# Patient Record
Sex: FEMALE | Race: WHITE | NOT HISPANIC OR LATINO | Employment: UNEMPLOYED | URBAN - NONMETROPOLITAN AREA
[De-identification: names, ages, dates, MRNs, and addresses within clinical notes are randomized per-mention and may not be internally consistent; named-entity substitution may affect disease eponyms.]

---
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
## 2018-01-31 ENCOUNTER — DOCUMENTATION (OUTPATIENT)
Dept: FAMILY MEDICINE CLINIC | Facility: CLINIC | Age: 18
End: 2018-01-31

## 2018-01-31 NOTE — PROGRESS NOTES
Referral was entered from Tena KAMARA to Alejandra KAMARA for shortness of breath. The number given from the patient was disconnected. I found another number for the patient and found it to be disconnected as well so I sent a letter to the patients last known address. The letter directs the patient on who to call if she needs an appointment with ANH Wallis.

## 2018-10-29 ENCOUNTER — OFFICE VISIT (OUTPATIENT)
Dept: FAMILY MEDICINE CLINIC | Facility: CLINIC | Age: 18
End: 2018-10-29

## 2018-10-29 ENCOUNTER — LAB (OUTPATIENT)
Dept: LAB | Facility: OTHER | Age: 18
End: 2018-10-29

## 2018-10-29 VITALS
DIASTOLIC BLOOD PRESSURE: 78 MMHG | BODY MASS INDEX: 15.59 KG/M2 | WEIGHT: 97 LBS | HEIGHT: 66 IN | HEART RATE: 111 BPM | SYSTOLIC BLOOD PRESSURE: 112 MMHG | OXYGEN SATURATION: 98 % | TEMPERATURE: 98.3 F

## 2018-10-29 DIAGNOSIS — R63.4 WEIGHT LOSS: ICD-10-CM

## 2018-10-29 DIAGNOSIS — R63.6 UNDERWEIGHT: ICD-10-CM

## 2018-10-29 DIAGNOSIS — R10.9 ABDOMINAL PAIN, UNSPECIFIED ABDOMINAL LOCATION: Primary | ICD-10-CM

## 2018-10-29 DIAGNOSIS — R10.9 ABDOMINAL PAIN, UNSPECIFIED ABDOMINAL LOCATION: ICD-10-CM

## 2018-10-29 LAB
ALBUMIN SERPL-MCNC: 4.6 G/DL (ref 3.5–5)
ALBUMIN/GLOB SERPL: 1.7 G/DL (ref 1.1–1.8)
ALP SERPL-CCNC: 41 U/L (ref 38–126)
ALT SERPL W P-5'-P-CCNC: 13 U/L
ANION GAP SERPL CALCULATED.3IONS-SCNC: 13 MMOL/L (ref 5–15)
AST SERPL-CCNC: 25 U/L (ref 14–36)
BACTERIA UR QL AUTO: ABNORMAL /HPF
BASOPHILS # BLD AUTO: 0.01 10*3/MM3 (ref 0–0.2)
BASOPHILS NFR BLD AUTO: 0.3 % (ref 0–2)
BILIRUB SERPL-MCNC: 1.2 MG/DL (ref 0.2–1.3)
BILIRUB UR QL STRIP: ABNORMAL
BUN BLD-MCNC: 13 MG/DL (ref 7–17)
BUN/CREAT SERPL: 13.3 (ref 7–25)
CALCIUM SPEC-SCNC: 10 MG/DL (ref 8.4–10.2)
CHLORIDE SERPL-SCNC: 110 MMOL/L (ref 98–107)
CLARITY UR: ABNORMAL
CO2 SERPL-SCNC: 19 MMOL/L (ref 22–30)
COLOR UR: ABNORMAL
CREAT BLD-MCNC: 0.98 MG/DL (ref 0.52–1.04)
DEPRECATED RDW RBC AUTO: 40.8 FL (ref 36.4–46.3)
EOSINOPHIL # BLD AUTO: 0.04 10*3/MM3 (ref 0–0.7)
EOSINOPHIL NFR BLD AUTO: 1 % (ref 0–7)
ERYTHROCYTE [DISTWIDTH] IN BLOOD BY AUTOMATED COUNT: 13.2 % (ref 11.5–14.5)
GFR SERPL CREATININE-BSD FRML MDRD: 72 ML/MIN/1.73 (ref 71–165)
GLOBULIN UR ELPH-MCNC: 2.7 GM/DL (ref 2.3–3.5)
GLUCOSE BLD-MCNC: 84 MG/DL (ref 74–99)
GLUCOSE UR STRIP-MCNC: NEGATIVE MG/DL
HCT VFR BLD AUTO: 43.8 % (ref 35–45)
HGB BLD-MCNC: 14.6 G/DL (ref 12–15.5)
HGB UR QL STRIP.AUTO: ABNORMAL
HYALINE CASTS UR QL AUTO: ABNORMAL /LPF
KETONES UR QL STRIP: ABNORMAL
LEUKOCYTE ESTERASE UR QL STRIP.AUTO: NEGATIVE
LYMPHOCYTES # BLD AUTO: 1.68 10*3/MM3 (ref 0.6–4.2)
LYMPHOCYTES NFR BLD AUTO: 43.1 % (ref 10–50)
MCH RBC QN AUTO: 28.6 PG (ref 26.5–34)
MCHC RBC AUTO-ENTMCNC: 33.3 G/DL (ref 31.4–36)
MCV RBC AUTO: 85.9 FL (ref 80–98)
MONOCYTES # BLD AUTO: 0.33 10*3/MM3 (ref 0–0.9)
MONOCYTES NFR BLD AUTO: 8.5 % (ref 0–12)
NEUTROPHILS # BLD AUTO: 1.84 10*3/MM3 (ref 2–8.6)
NEUTROPHILS NFR BLD AUTO: 47.1 % (ref 37–80)
NITRITE UR QL STRIP: NEGATIVE
PH UR STRIP.AUTO: <=5 [PH] (ref 5.5–8)
PLATELET # BLD AUTO: 243 10*3/MM3 (ref 150–450)
PMV BLD AUTO: 10.4 FL (ref 8–12)
POTASSIUM BLD-SCNC: 4.5 MMOL/L (ref 3.4–5)
PROT SERPL-MCNC: 7.3 G/DL (ref 6.3–8.2)
PROT UR QL STRIP: ABNORMAL
RBC # BLD AUTO: 5.1 10*6/MM3 (ref 3.77–5.16)
RBC # UR: ABNORMAL /HPF
REF LAB TEST METHOD: ABNORMAL
SODIUM BLD-SCNC: 142 MMOL/L (ref 137–145)
SP GR UR STRIP: >=1.03 (ref 1–1.03)
SQUAMOUS #/AREA URNS HPF: ABNORMAL /HPF
T4 FREE SERPL-MCNC: 1.32 NG/DL (ref 0.78–2.19)
TSH SERPL DL<=0.05 MIU/L-ACNC: 3.78 MIU/ML (ref 0.46–4.68)
UROBILINOGEN UR QL STRIP: ABNORMAL
WBC NRBC COR # BLD: 3.9 10*3/MM3 (ref 3.2–9.8)
WBC UR QL AUTO: ABNORMAL /HPF

## 2018-10-29 PROCEDURE — 84439 ASSAY OF FREE THYROXINE: CPT | Performed by: NURSE PRACTITIONER

## 2018-10-29 PROCEDURE — 99213 OFFICE O/P EST LOW 20 MIN: CPT | Performed by: NURSE PRACTITIONER

## 2018-10-29 PROCEDURE — 87086 URINE CULTURE/COLONY COUNT: CPT | Performed by: NURSE PRACTITIONER

## 2018-10-29 PROCEDURE — 81001 URINALYSIS AUTO W/SCOPE: CPT | Performed by: NURSE PRACTITIONER

## 2018-10-29 PROCEDURE — 80050 GENERAL HEALTH PANEL: CPT | Performed by: NURSE PRACTITIONER

## 2018-10-29 RX ORDER — CIPROFLOXACIN 500 MG/1
500 TABLET, FILM COATED ORAL 2 TIMES DAILY
Refills: 0 | COMMUNITY
Start: 2018-10-27 | End: 2019-01-30

## 2018-10-29 RX ORDER — DICYCLOMINE HCL 20 MG
20 TABLET ORAL 3 TIMES DAILY PRN
Refills: 0 | COMMUNITY
Start: 2018-10-27 | End: 2019-01-30

## 2018-10-29 RX ORDER — OMEPRAZOLE 20 MG/1
20 CAPSULE, DELAYED RELEASE ORAL DAILY
Refills: 0 | COMMUNITY
Start: 2018-10-27 | End: 2019-01-30

## 2018-10-29 RX ORDER — NORGESTREL AND ETHINYL ESTRADIOL 0.3-0.03MG
KIT ORAL
Refills: 0 | COMMUNITY
Start: 2018-09-06 | End: 2019-01-30

## 2018-10-30 ENCOUNTER — TELEPHONE (OUTPATIENT)
Dept: FAMILY MEDICINE CLINIC | Facility: CLINIC | Age: 18
End: 2018-10-30

## 2018-10-30 NOTE — TELEPHONE ENCOUNTER
Advised the patient of her UA. She is on her period.       ----- Message from ANH Varela sent at 10/29/2018  3:23 PM CDT -----  Pls inform she has blood in her urine, is she on her period?

## 2018-10-30 NOTE — TELEPHONE ENCOUNTER
Advised patient of XR of abdomen and she may take an OTC stool softener. The patient voiced understanding.       ----- Message from ANH Varela sent at 10/29/2018  3:21 PM CDT -----  Pls inform she has moderate amt of stool in RIGHT colon, may take OTC stool softener.

## 2018-10-31 LAB — BACTERIA SPEC AEROBE CULT: NORMAL

## 2018-11-12 NOTE — PROGRESS NOTES
Subjective   Mandi Wlaters is a 18 y.o. female who presents to the office complaining of nausea and anorexia the past two months.  Feels as if she will choke or vomit so she spits food out.  Was seen at [facility] two days ago for UTI and GERD.  [facility] bilious vomiting with nausea and was given Zofran.  Has a bowel movement only once per week.  PCP is [facility].  History of Present Illness     The following portions of the patient's history were reviewed and updated as appropriate: allergies, current medications, past family history, past medical history, past social history, past surgical history and problem list.    Review of Systems   Constitutional: Negative for chills, fatigue and fever.   HENT: Negative for congestion, sneezing, sore throat and trouble swallowing.    Eyes: Negative for visual disturbance.   Respiratory: Positive for choking. Negative for cough, chest tightness, shortness of breath and wheezing.    Cardiovascular: Negative for chest pain, palpitations and leg swelling.   Gastrointestinal: Positive for nausea. Negative for abdominal pain, constipation, diarrhea and vomiting.   Genitourinary: Negative for dysuria, frequency and urgency.   Musculoskeletal: Positive for arthralgias and myalgias. Negative for neck pain.   Skin: Negative for rash.   Neurological: Negative for dizziness, weakness and headaches.   Psychiatric/Behavioral:        In the past two weeks the pt has not felt down, depressed, hopeless or lost interest in doing things   All other systems reviewed and are negative.      Objective   Physical Exam   Constitutional: She is oriented to person, place, and time. She appears well-developed and well-nourished. She is cooperative.  Non-toxic appearance. She has a sickly appearance. She does not appear ill.   HENT:   Head: Normocephalic and atraumatic.   Right Ear: External ear normal.   Left Ear: External ear normal.   Nose: Nose normal.   Mouth/Throat:  Uvula is midline, oropharynx is clear and moist and mucous membranes are normal. Dental caries present.   Eyes: Conjunctivae, EOM and lids are normal. Pupils are equal, round, and reactive to light. Right eye exhibits no discharge. Left eye exhibits no discharge. No scleral icterus.   Neck: Trachea normal, normal range of motion and phonation normal. Neck supple. No thyromegaly present.   Cardiovascular: Normal rate, regular rhythm, normal heart sounds and intact distal pulses. Exam reveals no gallop and no friction rub.   No murmur heard.  Pulmonary/Chest: Effort normal and breath sounds normal. No respiratory distress. She has no wheezes. She has no rales.   Abdominal: Soft. Normal appearance and bowel sounds are normal. She exhibits no distension. There is generalized tenderness. There is no rebound and no guarding.   Musculoskeletal: Normal range of motion. She exhibits no edema.   Lymphadenopathy:     She has no cervical adenopathy.   Neurological: She is alert and oriented to person, place, and time. No cranial nerve deficit. GCS eye subscore is 4. GCS verbal subscore is 5. GCS motor subscore is 6.   Skin: Skin is warm, dry and intact. Capillary refill takes less than 2 seconds. No rash noted. There is pallor.   Psychiatric: She has a normal mood and affect. Her behavior is normal. Judgment and thought content normal.   Nursing note and vitals reviewed.      Assessment/Plan   Mandi was seen today for anorexia and nausea.    Diagnoses and all orders for this visit:    Abdominal pain, unspecified abdominal location  -     CBC & Differential; Future  -     Comprehensive Metabolic Panel; Future  -     T4, Free; Future  -     TSH; Future  -     Urinalysis With Culture If Indicated - Urine, Clean Catch; Future  -     XR Abdomen Flat & Upright    Weight loss  -     CBC & Differential; Future  -     Comprehensive Metabolic Panel; Future  -     T4, Free; Future  -     TSH; Future  -     Urinalysis With Culture If  Indicated - Urine, Clean Catch; Future  -     XR Abdomen Flat & Upright    Underweight  -     CBC & Differential; Future  -     Comprehensive Metabolic Panel; Future  -     T4, Free; Future  -     TSH; Future  -     Urinalysis With Culture If Indicated - Urine, Clean Catch; Future  -     XR Abdomen Flat & Upright    Encouraged clear liquids and adv as tolerated.  Good handwashing.  OTC Miralax prn.

## 2019-05-07 ENCOUNTER — LAB (OUTPATIENT)
Dept: LAB | Facility: OTHER | Age: 19
End: 2019-05-07

## 2019-05-07 ENCOUNTER — OFFICE VISIT (OUTPATIENT)
Dept: FAMILY MEDICINE CLINIC | Facility: CLINIC | Age: 19
End: 2019-05-07

## 2019-05-07 VITALS
HEIGHT: 65 IN | SYSTOLIC BLOOD PRESSURE: 100 MMHG | OXYGEN SATURATION: 97 % | TEMPERATURE: 98.7 F | WEIGHT: 101 LBS | DIASTOLIC BLOOD PRESSURE: 64 MMHG | BODY MASS INDEX: 16.83 KG/M2 | HEART RATE: 102 BPM

## 2019-05-07 DIAGNOSIS — R10.84 GENERALIZED ABDOMINAL PAIN: ICD-10-CM

## 2019-05-07 DIAGNOSIS — R11.0 NAUSEA: ICD-10-CM

## 2019-05-07 DIAGNOSIS — R63.6 UNDERWEIGHT: ICD-10-CM

## 2019-05-07 DIAGNOSIS — R63.6 UNDERWEIGHT: Primary | ICD-10-CM

## 2019-05-07 LAB
ALBUMIN SERPL-MCNC: 4.5 G/DL (ref 3.5–5)
ALBUMIN/GLOB SERPL: 1.6 G/DL (ref 1.1–1.8)
ALP SERPL-CCNC: 57 U/L (ref 38–126)
ALT SERPL W P-5'-P-CCNC: 14 U/L
ANION GAP SERPL CALCULATED.3IONS-SCNC: 9 MMOL/L (ref 5–15)
AST SERPL-CCNC: 20 U/L (ref 14–36)
B-HCG UR QL: NEGATIVE
BASOPHILS # BLD AUTO: 0.02 10*3/MM3 (ref 0–0.2)
BASOPHILS NFR BLD AUTO: 0.3 % (ref 0–1.5)
BILIRUB SERPL-MCNC: 0.7 MG/DL (ref 0.2–1.3)
BILIRUB UR QL STRIP: NEGATIVE
BUN BLD-MCNC: 9 MG/DL (ref 7–23)
BUN/CREAT SERPL: 8.3 (ref 7–25)
CALCIUM SPEC-SCNC: 10 MG/DL (ref 8.4–10.2)
CHLORIDE SERPL-SCNC: 104 MMOL/L (ref 101–112)
CLARITY UR: CLEAR
CO2 SERPL-SCNC: 27 MMOL/L (ref 22–30)
COLOR UR: YELLOW
CREAT BLD-MCNC: 1.08 MG/DL (ref 0.52–1.04)
DEPRECATED RDW RBC AUTO: 45.8 FL (ref 37–54)
EOSINOPHIL # BLD AUTO: 0.14 10*3/MM3 (ref 0–0.4)
EOSINOPHIL NFR BLD AUTO: 2.1 % (ref 0.3–6.2)
ERYTHROCYTE [DISTWIDTH] IN BLOOD BY AUTOMATED COUNT: 14.4 % (ref 12.3–15.4)
GFR SERPL CREATININE-BSD FRML MDRD: 64 ML/MIN/1.73 (ref 71–165)
GLOBULIN UR ELPH-MCNC: 2.8 GM/DL (ref 2.3–3.5)
GLUCOSE BLD-MCNC: 98 MG/DL (ref 70–99)
GLUCOSE UR STRIP-MCNC: NEGATIVE MG/DL
HCT VFR BLD AUTO: 44.6 % (ref 34–46.6)
HGB BLD-MCNC: 14.5 G/DL (ref 12–15.9)
HGB UR QL STRIP.AUTO: NEGATIVE
KETONES UR QL STRIP: ABNORMAL
LEUKOCYTE ESTERASE UR QL STRIP.AUTO: NEGATIVE
LYMPHOCYTES # BLD AUTO: 3.29 10*3/MM3 (ref 0.7–3.1)
LYMPHOCYTES NFR BLD AUTO: 49 % (ref 19.6–45.3)
MCH RBC QN AUTO: 29.1 PG (ref 26.6–33)
MCHC RBC AUTO-ENTMCNC: 32.5 G/DL (ref 31.5–35.7)
MCV RBC AUTO: 89.4 FL (ref 79–97)
MONOCYTES # BLD AUTO: 0.47 10*3/MM3 (ref 0.1–0.9)
MONOCYTES NFR BLD AUTO: 7 % (ref 5–12)
NEUTROPHILS # BLD AUTO: 2.79 10*3/MM3 (ref 1.7–7)
NEUTROPHILS NFR BLD AUTO: 41.6 % (ref 42.7–76)
NITRITE UR QL STRIP: NEGATIVE
PH UR STRIP.AUTO: 5.5 [PH] (ref 5.5–8)
PLATELET # BLD AUTO: 261 10*3/MM3 (ref 140–450)
PMV BLD AUTO: 10.4 FL (ref 6–12)
POTASSIUM BLD-SCNC: 4 MMOL/L (ref 3.4–5)
PROT SERPL-MCNC: 7.3 G/DL (ref 6.3–8.6)
PROT UR QL STRIP: NEGATIVE
RBC # BLD AUTO: 4.99 10*6/MM3 (ref 3.77–5.28)
SODIUM BLD-SCNC: 140 MMOL/L (ref 137–145)
SP GR UR STRIP: >=1.03 (ref 1–1.03)
UROBILINOGEN UR QL STRIP: ABNORMAL
WBC NRBC COR # BLD: 6.71 10*3/MM3 (ref 3.4–10.8)

## 2019-05-07 PROCEDURE — 80307 DRUG TEST PRSMV CHEM ANLYZR: CPT | Performed by: NURSE PRACTITIONER

## 2019-05-07 PROCEDURE — 84439 ASSAY OF FREE THYROXINE: CPT | Performed by: NURSE PRACTITIONER

## 2019-05-07 PROCEDURE — 81025 URINE PREGNANCY TEST: CPT | Performed by: NURSE PRACTITIONER

## 2019-05-07 PROCEDURE — 99213 OFFICE O/P EST LOW 20 MIN: CPT | Performed by: NURSE PRACTITIONER

## 2019-05-07 PROCEDURE — 80050 GENERAL HEALTH PANEL: CPT | Performed by: NURSE PRACTITIONER

## 2019-05-07 PROCEDURE — 82607 VITAMIN B-12: CPT | Performed by: NURSE PRACTITIONER

## 2019-05-07 PROCEDURE — 82306 VITAMIN D 25 HYDROXY: CPT | Performed by: NURSE PRACTITIONER

## 2019-05-07 PROCEDURE — 81003 URINALYSIS AUTO W/O SCOPE: CPT | Performed by: NURSE PRACTITIONER

## 2019-05-07 RX ORDER — RANITIDINE HCL 75 MG
75 TABLET ORAL 2 TIMES DAILY
Qty: 60 TABLET | Refills: 0 | Status: SHIPPED | OUTPATIENT
Start: 2019-05-07 | End: 2019-06-10 | Stop reason: SDUPTHER

## 2019-05-07 RX ORDER — MEDROXYPROGESTERONE ACETATE 150 MG/ML
150 INJECTION, SUSPENSION INTRAMUSCULAR
COMMUNITY
End: 2019-07-09 | Stop reason: SDUPTHER

## 2019-05-07 RX ORDER — ONDANSETRON 4 MG/1
4 TABLET, FILM COATED ORAL EVERY 8 HOURS PRN
Qty: 21 TABLET | Refills: 0 | Status: SHIPPED | OUTPATIENT
Start: 2019-05-07 | End: 2019-06-10

## 2019-05-08 LAB
25(OH)D3 SERPL-MCNC: 47.7 NG/ML (ref 30–100)
AMPHET+METHAMPHET UR QL: NEGATIVE
BARBITURATES UR QL SCN: NEGATIVE
BENZODIAZ UR QL SCN: NEGATIVE
CANNABINOIDS SERPL QL: NEGATIVE
COCAINE UR QL: NEGATIVE
METHADONE UR QL SCN: NEGATIVE
OPIATES UR QL: NEGATIVE
OXYCODONE UR QL SCN: NEGATIVE
T4 FREE SERPL-MCNC: 1.53 NG/DL (ref 0.93–1.7)
TSH SERPL DL<=0.05 MIU/L-ACNC: 1.22 MIU/ML (ref 0.27–4.2)
VIT B12 BLD-MCNC: 211 PG/ML (ref 211–946)

## 2019-05-10 ENCOUNTER — CLINICAL SUPPORT (OUTPATIENT)
Dept: FAMILY MEDICINE CLINIC | Facility: CLINIC | Age: 19
End: 2019-05-10

## 2019-05-10 ENCOUNTER — TELEPHONE (OUTPATIENT)
Dept: FAMILY MEDICINE CLINIC | Facility: CLINIC | Age: 19
End: 2019-05-10

## 2019-05-10 DIAGNOSIS — E53.8 B12 DEFICIENCY: Primary | ICD-10-CM

## 2019-05-10 PROCEDURE — 96372 THER/PROPH/DIAG INJ SC/IM: CPT | Performed by: NURSE PRACTITIONER

## 2019-05-10 RX ORDER — MEGESTROL ACETATE 40 MG/ML
400 SUSPENSION ORAL DAILY
Qty: 240 ML | Refills: 0 | OUTPATIENT
Start: 2019-05-10 | End: 2019-05-17

## 2019-05-10 RX ORDER — CYANOCOBALAMIN 1000 UG/ML
1000 INJECTION, SOLUTION INTRAMUSCULAR; SUBCUTANEOUS
Status: DISCONTINUED | OUTPATIENT
Start: 2019-05-10 | End: 2022-02-23

## 2019-05-10 RX ADMIN — CYANOCOBALAMIN 1000 MCG: 1000 INJECTION, SOLUTION INTRAMUSCULAR; SUBCUTANEOUS at 11:01

## 2019-05-10 NOTE — TELEPHONE ENCOUNTER
I advised the patient of her lab results and monthly B12 injections. The patient voiced understanding.       ----- Message from ANH Varela sent at 5/8/2019  9:39 AM CDT -----  Pls inform her v b12 is low, get her set up on injections to come in here on nurses schedule.  She needs to come once a month.

## 2019-05-22 NOTE — PROGRESS NOTES
Subjective   Mandi Walters is a 19 y.o. female. Presents today with complaints of abd pain that occurs within five minutes of eating.  Is accompanied by nausea.  Denies diarrhea after eating.  Had a bowel movement yesterday.  Is requesting something for weight gain.    History of Present Illness     The following portions of the patient's history were reviewed and updated as appropriate: allergies, current medications, past family history, past medical history, past social history, past surgical history and problem list.    Review of Systems   Constitutional: Positive for appetite change. Negative for chills, fatigue and fever.   HENT: Negative for congestion, sneezing, sore throat and trouble swallowing.    Eyes: Negative for visual disturbance.   Respiratory: Negative for cough, chest tightness, shortness of breath and wheezing.    Cardiovascular: Negative for chest pain, palpitations and leg swelling.   Gastrointestinal: Positive for abdominal pain and nausea. Negative for constipation, diarrhea and vomiting.   Genitourinary: Negative for dysuria, frequency and urgency.   Musculoskeletal: Negative for neck pain.   Skin: Negative for rash.   Neurological: Negative for dizziness, weakness and headaches.   Psychiatric/Behavioral:        In the past two weeks the pt has not felt down, depressed, hopeless or lost interest in doing things   All other systems reviewed and are negative.      Objective   Physical Exam   Constitutional: She is oriented to person, place, and time. She appears well-developed. She appears cachectic. She is cooperative. She has a sickly appearance.   HENT:   Head: Normocephalic and atraumatic.   Right Ear: Tympanic membrane and external ear normal.   Left Ear: Tympanic membrane and external ear normal.   Nose: Nose normal.   Mouth/Throat: Uvula is midline, oropharynx is clear and moist and mucous membranes are normal.   Eyes: Conjunctivae, EOM and lids are normal. Pupils are equal,  round, and reactive to light. Right eye exhibits no discharge. Left eye exhibits no discharge. No scleral icterus.   Neck: Normal range of motion. Neck supple. No thyromegaly present.   Cardiovascular: Normal rate, regular rhythm, normal heart sounds and intact distal pulses. Exam reveals no gallop and no friction rub.   No murmur heard.  Pulmonary/Chest: Effort normal and breath sounds normal. No respiratory distress. She has no wheezes. She has no rales.   Abdominal: Soft. Normal appearance and bowel sounds are normal. She exhibits no distension. There is generalized tenderness. There is no rigidity, no rebound, no guarding, no CVA tenderness, no tenderness at McBurney's point and negative Roberto's sign.   Musculoskeletal: Normal range of motion. She exhibits no edema.   Lymphadenopathy:     She has no cervical adenopathy.   Neurological: She is alert and oriented to person, place, and time. No cranial nerve deficit. GCS eye subscore is 4. GCS verbal subscore is 5. GCS motor subscore is 6.   Skin: Skin is warm, dry and intact. Capillary refill takes less than 2 seconds. No rash noted.   Psychiatric: She has a normal mood and affect. Her behavior is normal. Judgment and thought content normal.   Nursing note and vitals reviewed.      Assessment/Plan   Mandi was seen today for annual exam.    Diagnoses and all orders for this visit:    Underweight  -     T4, Free; Future  -     TSH; Future  -     Urinalysis With Culture If Indicated - Urine, Clean Catch; Future  -     Urine Drug Screen - Urine, Clean Catch; Future  -     Vitamin B12; Future  -     Vitamin D 25 Hydroxy; Future  -     Pregnancy, Urine - Urine, Clean Catch; Future    Generalized abdominal pain  -     CBC & Differential; Future  -     Comprehensive Metabolic Panel; Future  -     Urinalysis With Culture If Indicated - Urine, Clean Catch; Future  -     Urine Drug Screen - Urine, Clean Catch; Future  -     Vitamin B12; Future  -     Vitamin D 25 Hydroxy;  Future  -     Pregnancy, Urine - Urine, Clean Catch; Future    Nausea  -     Urinalysis With Culture If Indicated - Urine, Clean Catch; Future  -     Urine Drug Screen - Urine, Clean Catch; Future  -     Vitamin B12; Future  -     Vitamin D 25 Hydroxy; Future  -     Pregnancy, Urine - Urine, Clean Catch; Future    Other orders  -     raNITIdine (ZANTAC) 75 MG tablet; Take 1 tablet by mouth 2 (Two) Times a Day.  -     ondansetron (ZOFRAN) 4 MG tablet; Take 1 tablet by mouth Every 8 (Eight) Hours As Needed for Nausea or Vomiting.    Encouraged to eat small, frequent meals a day.  Did not ask for pain medications.  Make sure drinking at least 96 ounces of water daily.  Requesting labwork prior to starting appetite stimulant.

## 2019-06-10 ENCOUNTER — OFFICE VISIT (OUTPATIENT)
Dept: FAMILY MEDICINE CLINIC | Facility: CLINIC | Age: 19
End: 2019-06-10

## 2019-06-10 VITALS
TEMPERATURE: 98.6 F | HEART RATE: 104 BPM | SYSTOLIC BLOOD PRESSURE: 110 MMHG | BODY MASS INDEX: 17.83 KG/M2 | OXYGEN SATURATION: 98 % | DIASTOLIC BLOOD PRESSURE: 70 MMHG | HEIGHT: 65 IN | WEIGHT: 107 LBS

## 2019-06-10 DIAGNOSIS — R63.4 WEIGHT LOSS: ICD-10-CM

## 2019-06-10 DIAGNOSIS — R10.84 GENERALIZED ABDOMINAL PAIN: ICD-10-CM

## 2019-06-10 DIAGNOSIS — N93.9 VAGINAL BLEEDING: ICD-10-CM

## 2019-06-10 DIAGNOSIS — R63.6 UNDERWEIGHT: Primary | ICD-10-CM

## 2019-06-10 DIAGNOSIS — R11.0 NAUSEA: ICD-10-CM

## 2019-06-10 PROCEDURE — 96372 THER/PROPH/DIAG INJ SC/IM: CPT | Performed by: NURSE PRACTITIONER

## 2019-06-10 PROCEDURE — 99214 OFFICE O/P EST MOD 30 MIN: CPT | Performed by: NURSE PRACTITIONER

## 2019-06-10 RX ORDER — PROMETHAZINE HYDROCHLORIDE 25 MG/1
25 TABLET ORAL DAILY PRN
Qty: 21 TABLET | Refills: 0 | Status: SHIPPED | OUTPATIENT
Start: 2019-06-10 | End: 2019-06-26 | Stop reason: SDUPTHER

## 2019-06-10 RX ORDER — RANITIDINE HCL 75 MG
75 TABLET ORAL 2 TIMES DAILY
Qty: 60 TABLET | Refills: 0 | Status: SHIPPED | OUTPATIENT
Start: 2019-06-10 | End: 2019-06-26 | Stop reason: SDUPTHER

## 2019-06-10 RX ADMIN — CYANOCOBALAMIN 1000 MCG: 1000 INJECTION, SOLUTION INTRAMUSCULAR; SUBCUTANEOUS at 14:15

## 2019-06-10 NOTE — PROGRESS NOTES
"Subjective   Mandi Walters is a 19 y.o. female who presents to the office for underweight follow-up.  Was taking Megestrol but started to develop adverse side effects after just a few doses.  When I asked her what side effects she said \"all of them.\"  Then I asked if she read the insert describing the side effects located inside the box prior to taking the medication but she denies and states the started with the side effects first. [facility]  for [facility] issue.  Tells me she starting having difficulty breathing.  Tells me she grazes instead of three meals a day.  Also states that the Zofran is not helping with the nausea, is requesting Phenergan.  Is also having vaginal bleeding, is on DepoProvera injections.  Proceeds to then tell me she's having low back pain, taking Tylenol which is hurting her stomach.  Advised no NSAIDs.  Unable to use a heating pad because she currently has  puppies that are using that, \"the momma  and almost all the puppies have .\"    History of Present Illness     The following portions of the patient's history were reviewed and updated as appropriate: allergies, current medications, past family history, past medical history, past social history, past surgical history and problem list.    Review of Systems   Constitutional: Positive for appetite change. Negative for chills, fatigue and fever.   HENT: Negative for congestion, sneezing, sore throat and trouble swallowing.    Eyes: Negative for visual disturbance.   Respiratory: Negative for cough, chest tightness, shortness of breath and wheezing.    Cardiovascular: Negative for chest pain, palpitations and leg swelling.   Gastrointestinal: Negative for abdominal pain, constipation, diarrhea, nausea and vomiting.   Genitourinary: Positive for vaginal bleeding. Negative for dysuria, frequency and urgency.   Musculoskeletal: Positive for back pain. Negative for neck pain.   Skin: Negative for rash. "   Neurological: Negative for dizziness, weakness and headaches.   Psychiatric/Behavioral:        In the past two weeks the pt has not felt down, depressed, hopeless or lost interest in doing things   All other systems reviewed and are negative.      Objective   Physical Exam   Constitutional: She is oriented to person, place, and time. She appears well-developed and well-nourished. She is cooperative.  Non-toxic appearance. No distress.   HENT:   Head: Normocephalic and atraumatic.   Right Ear: External ear normal.   Left Ear: External ear normal.   Nose: Nose normal.   Mouth/Throat: Oropharynx is clear and moist.   Eyes: Conjunctivae, EOM and lids are normal. Pupils are equal, round, and reactive to light. Right eye exhibits no discharge. Left eye exhibits no discharge. No scleral icterus.   Neck: Normal range of motion. Neck supple. No thyromegaly present.   Cardiovascular: Normal rate, regular rhythm, normal heart sounds and intact distal pulses. Exam reveals no gallop and no friction rub.   No murmur heard.  Pulmonary/Chest: Effort normal and breath sounds normal. No respiratory distress. She has no wheezes. She has no rales.   Abdominal: Soft. Normal appearance and bowel sounds are normal. She exhibits no distension. There is no tenderness. There is no rebound and no guarding.   Musculoskeletal: Normal range of motion. She exhibits no edema.   Lymphadenopathy:     She has no cervical adenopathy.   Neurological: She is alert and oriented to person, place, and time. No cranial nerve deficit. GCS eye subscore is 4. GCS verbal subscore is 5. GCS motor subscore is 6.   Patient is simple-minded, able to carry on conversation; dropped out of school in the 8th grade.   Skin: Skin is warm and dry. No rash noted.   Psychiatric: She has a normal mood and affect. Her behavior is normal. Judgment and thought content normal.   Nursing note and vitals reviewed.      Assessment/Plan   Mandi was seen today for  underweight.    Diagnoses and all orders for this visit:    Underweight  -     Ambulatory Referral to Gastroenterology    Generalized abdominal pain  -     Ambulatory Referral to Gastroenterology    Nausea  -     Ambulatory Referral to Gastroenterology    Weight loss  -     Ambulatory Referral to Gastroenterology    Vaginal bleeding  -     Ambulatory Referral to Gynecology    Other orders  -     raNITIdine (ZANTAC) 75 MG tablet; Take 1 tablet by mouth 2 (Two) Times a Day.  -     promethazine (PHENERGAN) 25 MG tablet; Take 1 tablet by mouth Daily As Needed for Nausea or Vomiting.    Encouraged to continue with small, frequent meals from a balanced diet.    Will give Phenergan but at daily prn and #21.  Referrals completed.

## 2019-06-13 ENCOUNTER — OFFICE VISIT (OUTPATIENT)
Dept: GASTROENTEROLOGY | Facility: CLINIC | Age: 19
End: 2019-06-13

## 2019-06-13 VITALS
BODY MASS INDEX: 18.17 KG/M2 | WEIGHT: 109.08 LBS | HEIGHT: 65 IN | HEART RATE: 86 BPM | DIASTOLIC BLOOD PRESSURE: 62 MMHG | SYSTOLIC BLOOD PRESSURE: 120 MMHG

## 2019-06-13 DIAGNOSIS — R10.10 UPPER ABDOMINAL PAIN: ICD-10-CM

## 2019-06-13 DIAGNOSIS — K21.9 GASTROESOPHAGEAL REFLUX DISEASE, ESOPHAGITIS PRESENCE NOT SPECIFIED: Primary | ICD-10-CM

## 2019-06-13 DIAGNOSIS — R11.0 NAUSEA: ICD-10-CM

## 2019-06-13 PROCEDURE — 99214 OFFICE O/P EST MOD 30 MIN: CPT | Performed by: NURSE PRACTITIONER

## 2019-06-13 RX ORDER — DEXTROSE AND SODIUM CHLORIDE 5; .45 G/100ML; G/100ML
30 INJECTION, SOLUTION INTRAVENOUS CONTINUOUS PRN
Status: CANCELLED | OUTPATIENT
Start: 2019-07-23

## 2019-06-13 NOTE — PROGRESS NOTES
Chief Complaint   Patient presents with   • Abdominal Pain       Subjective    Mandi Walters is a 19 y.o. female. she is here today for follow-up.    Abdominal Pain   This is a new problem. The current episode started more than 1 month ago. The onset quality is undetermined. The problem has been waxing and waning. The pain is located in the epigastric region. The pain is mild. The quality of the pain is cramping. Associated symptoms include headaches and nausea. Pertinent negatives include no arthralgias, constipation, diarrhea, fever or vomiting.   19-year-old female presents to discuss weight loss and abdominal pain.  Her weight is up 8 pounds from 5/7/2019.  States she has always had trouble with eating.  History is difficult to obtain patient is very vague with her answers.  Reports sometimes she has bowel movement sometimes she does not she does not see any blood and reports stool appears to be normal consistency.  States she is always hungry.  States a few years ago she had EGD in [facility] and was told she had a vessel rupture on her stomach that was causing bleeding and never followed up with it.  States Zantac helps reflux somewhat.  Plan; schedule patient for EGD I have also recommended colonoscopy due to abdominal pain however she declines to schedule colonoscopy and states she will not drink colon prep.  Follow-up after test return office sooner if needed          The following portions of the patient's history were reviewed and updated as appropriate:   Past Medical History:   Diagnosis Date   • Bacterial vaginosis    • Costal chondritis    • Fatigue    • Hypoglycemia    • Malaise and fatigue    • Menorrhagia    • Well child visit      Past Surgical History:   Procedure Laterality Date   • TYMPANOSTOMY TUBE PLACEMENT      Tympanostomy tube-2 (1)      Family History   Problem Relation Age of Onset   • Osteoarthritis Mother    • Diabetes Maternal Grandmother    • Heart disease Maternal  Grandmother    • Osteoarthritis Maternal Grandmother    • Coronary artery disease Paternal Grandfather      OB History      Para Term  AB Living    3       3      SAB TAB Ectopic Molar Multiple Live Births    3                  Prior to Admission medications    Medication Sig Start Date End Date Taking? Authorizing Provider   medroxyPROGESTERone (DEPO-PROVERA) 150 MG/ML injection Inject 150 mg into the appropriate muscle as directed by prescriber.   Yes Provider, MD Fam   promethazine (PHENERGAN) 25 MG tablet Take 1 tablet by mouth Daily As Needed for Nausea or Vomiting. 6/10/19  Yes Idalmis Peter APRN   raNITIdine (ZANTAC) 75 MG tablet Take 1 tablet by mouth 2 (Two) Times a Day. 6/10/19  Yes Idalmis Peter APRN     Allergies   Allergen Reactions   • Amoxicillin      Anaphylaxis   • Codeine      Rash   • Loratadine      Unknown   • Penicillins      Anaphylaxis     Social History     Socioeconomic History   • Marital status: Single     Spouse name: Not on file   • Number of children: Not on file   • Years of education: Not on file   • Highest education level: Not on file   Tobacco Use   • Smoking status: Former Smoker     Packs/day: 1.00     Years: 7.00     Pack years: 7.00   • Smokeless tobacco: Never Used   Substance and Sexual Activity   • Alcohol use: No   • Drug use: No   • Sexual activity: Yes     Partners: Male     Birth control/protection: Injection       Review of Systems  Review of Systems   Constitutional: Positive for fatigue. Negative for activity change, appetite change (hungry, cant eat as much as she wants due to pain d), chills, diaphoresis, fever and unexpected weight change.   HENT: Negative for sore throat and trouble swallowing.    Respiratory: Negative for shortness of breath.    Gastrointestinal: Positive for abdominal distention, abdominal pain and nausea. Negative for anal bleeding, blood in stool, constipation, diarrhea, rectal pain and vomiting.   Musculoskeletal:  "Negative for arthralgias.   Skin: Negative for pallor.   Neurological: Positive for headaches. Negative for light-headedness.        /62 (BP Location: Left arm)   Pulse 86   Ht 165.1 cm (65\")   Wt 49.5 kg (109 lb 1.3 oz)   BMI 18.15 kg/m²     Objective    Physical Exam   Constitutional: She is oriented to person, place, and time. She appears well-developed and well-nourished. She is cooperative. No distress.   HENT:   Head: Normocephalic and atraumatic.   Neck: Normal range of motion. Neck supple. No thyromegaly present.   Cardiovascular: Normal rate, regular rhythm and normal heart sounds.   Pulmonary/Chest: Effort normal and breath sounds normal. She has no wheezes. She has no rhonchi. She has no rales.   Abdominal: Soft. Normal appearance and bowel sounds are normal. She exhibits no distension. There is no hepatosplenomegaly. There is tenderness in the right upper quadrant, epigastric area and left upper quadrant. There is no rigidity and no guarding. No hernia.   Lymphadenopathy:     She has no cervical adenopathy.   Neurological: She is alert and oriented to person, place, and time.   Skin: Skin is warm, dry and intact. No rash noted. No pallor.   Psychiatric: She has a normal mood and affect. Her speech is normal.     Lab on 05/07/2019   Component Date Value Ref Range Status   • Color, UA 05/07/2019 Yellow  Yellow, Straw Final   • Appearance, UA 05/07/2019 Clear  Clear Final   • pH, UA 05/07/2019 5.5  5.5 - 8.0 Final   • Specific Gravity, UA 05/07/2019 >=1.030  1.005 - 1.030 Final   • Glucose, UA 05/07/2019 Negative  Negative Final   • Ketones, UA 05/07/2019 Trace* Negative Final   • Bilirubin, UA 05/07/2019 Negative  Negative Final   • Blood, UA 05/07/2019 Negative  Negative Final   • Protein, UA 05/07/2019 Negative  Negative Final   • Leuk Esterase, UA 05/07/2019 Negative  Negative Final   • Nitrite, UA 05/07/2019 Negative  Negative Final   • Urobilinogen, UA 05/07/2019 1.0 E.U./dL  0.2 - 1.0 " E.U./dL Final   • Amphet/Methamphet, Screen 05/07/2019 Negative  Negative Final   • Barbiturates Screen, Urine 05/07/2019 Negative  Negative Final   • Benzodiazepine Screen, Urine 05/07/2019 Negative  Negative Final   • Cocaine Screen, Urine 05/07/2019 Negative  Negative Final   • Opiate Screen 05/07/2019 Negative  Negative Final   • THC, Screen, Urine 05/07/2019 Negative  Negative Final   • Methadone Screen, Urine 05/07/2019 Negative  Negative Final   • Oxycodone Screen, Urine 05/07/2019 Negative  Negative Final   • HCG, Urine QL 05/07/2019 Negative  Negative Final   • Glucose 05/07/2019 98  70 - 99 mg/dL Final   • BUN 05/07/2019 9  7 - 23 mg/dL Final   • Creatinine 05/07/2019 1.08* 0.52 - 1.04 mg/dL Final   • Sodium 05/07/2019 140  137 - 145 mmol/L Final   • Potassium 05/07/2019 4.0  3.4 - 5.0 mmol/L Final   • Chloride 05/07/2019 104  101 - 112 mmol/L Final   • CO2 05/07/2019 27.0  22.0 - 30.0 mmol/L Final   • Calcium 05/07/2019 10.0  8.4 - 10.2 mg/dL Final   • Total Protein 05/07/2019 7.3  6.3 - 8.6 g/dL Final   • Albumin 05/07/2019 4.50  3.50 - 5.00 g/dL Final   • ALT (SGPT) 05/07/2019 14  <=35 U/L Final   • AST (SGOT) 05/07/2019 20  14 - 36 U/L Final   • Alkaline Phosphatase 05/07/2019 57  38 - 126 U/L Final   • Total Bilirubin 05/07/2019 0.7  0.2 - 1.3 mg/dL Final   • eGFR Non African Amer 05/07/2019 64* 71 - 165 mL/min/1.73 Final   • Globulin 05/07/2019 2.8  2.3 - 3.5 gm/dL Final   • A/G Ratio 05/07/2019 1.6  1.1 - 1.8 g/dL Final   • BUN/Creatinine Ratio 05/07/2019 8.3  7.0 - 25.0 Final   • Anion Gap 05/07/2019 9.0  5.0 - 15.0 mmol/L Final   • Free T4 05/07/2019 1.53  0.93 - 1.70 ng/dL Final   • TSH 05/07/2019 1.220  0.270 - 4.200 mIU/mL Final   • Vitamin B-12 05/07/2019 211  211 - 946 pg/mL Final   • 25 Hydroxy, Vitamin D 05/07/2019 47.7  30.0 - 100.0 ng/ml Final   • WBC 05/07/2019 6.71  3.40 - 10.80 10*3/mm3 Final   • RBC 05/07/2019 4.99  3.77 - 5.28 10*6/mm3 Final   • Hemoglobin 05/07/2019 14.5  12.0 -  15.9 g/dL Final   • Hematocrit 05/07/2019 44.6  34.0 - 46.6 % Final   • MCV 05/07/2019 89.4  79.0 - 97.0 fL Final   • MCH 05/07/2019 29.1  26.6 - 33.0 pg Final   • MCHC 05/07/2019 32.5  31.5 - 35.7 g/dL Final   • RDW 05/07/2019 14.4  12.3 - 15.4 % Final   • RDW-SD 05/07/2019 45.8  37.0 - 54.0 fl Final   • MPV 05/07/2019 10.4  6.0 - 12.0 fL Final   • Platelets 05/07/2019 261  140 - 450 10*3/mm3 Final   • Neutrophil % 05/07/2019 41.6* 42.7 - 76.0 % Final   • Lymphocyte % 05/07/2019 49.0* 19.6 - 45.3 % Final   • Monocyte % 05/07/2019 7.0  5.0 - 12.0 % Final   • Eosinophil % 05/07/2019 2.1  0.3 - 6.2 % Final   • Basophil % 05/07/2019 0.3  0.0 - 1.5 % Final   • Neutrophils, Absolute 05/07/2019 2.79  1.70 - 7.00 10*3/mm3 Final   • Lymphocytes, Absolute 05/07/2019 3.29* 0.70 - 3.10 10*3/mm3 Final   • Monocytes, Absolute 05/07/2019 0.47  0.10 - 0.90 10*3/mm3 Final   • Eosinophils, Absolute 05/07/2019 0.14  0.00 - 0.40 10*3/mm3 Final   • Basophils, Absolute 05/07/2019 0.02  0.00 - 0.20 10*3/mm3 Final     Assessment/Plan      1. Gastroesophageal reflux disease, esophagitis presence not specified    2. Nausea    3. Upper abdominal pain    .       Orders placed during this encounter include:  Orders Placed This Encounter   Procedures   • Follow Anesthesia Guidelines / Standing Orders     Standing Status:   Future   • Obtain Informed Consent     Standing Status:   Future     Order Specific Question:   Informed Consent Given For     Answer:   ESOPHAGOGASTRODUODENOSCOPY possible dilation       ESOPHAGOGASTRODUODENOSCOPY possible dilation (N/A)    Review and/or summary of lab tests, radiology, procedures, medications. Review and summary of old records and obtaining of history. The risks and benefits of my recommendations, as well as other treatment options were discussed with the patient today. Questions were answered.    No orders of the defined types were placed in this encounter.      Follow-up: Return in about 4 weeks (around  7/11/2019).          This document has been electronically signed by ANH Xiao on June 13, 2019 2:49 PM             Results for orders placed or performed in visit on 05/07/19   Urinalysis With Culture If Indicated - Urine, Clean Catch   Result Value Ref Range    Color, UA Yellow Yellow, Straw    Appearance, UA Clear Clear    pH, UA 5.5 5.5 - 8.0    Specific Gravity, UA >=1.030 1.005 - 1.030    Glucose, UA Negative Negative    Ketones, UA Trace (A) Negative    Bilirubin, UA Negative Negative    Blood, UA Negative Negative    Protein, UA Negative Negative    Leuk Esterase, UA Negative Negative    Nitrite, UA Negative Negative    Urobilinogen, UA 1.0 E.U./dL 0.2 - 1.0 E.U./dL   CBC Auto Differential   Result Value Ref Range    WBC 6.71 3.40 - 10.80 10*3/mm3    RBC 4.99 3.77 - 5.28 10*6/mm3    Hemoglobin 14.5 12.0 - 15.9 g/dL    Hematocrit 44.6 34.0 - 46.6 %    MCV 89.4 79.0 - 97.0 fL    MCH 29.1 26.6 - 33.0 pg    MCHC 32.5 31.5 - 35.7 g/dL    RDW 14.4 12.3 - 15.4 %    RDW-SD 45.8 37.0 - 54.0 fl    MPV 10.4 6.0 - 12.0 fL    Platelets 261 140 - 450 10*3/mm3    Neutrophil % 41.6 (L) 42.7 - 76.0 %    Lymphocyte % 49.0 (H) 19.6 - 45.3 %    Monocyte % 7.0 5.0 - 12.0 %    Eosinophil % 2.1 0.3 - 6.2 %    Basophil % 0.3 0.0 - 1.5 %    Neutrophils, Absolute 2.79 1.70 - 7.00 10*3/mm3    Lymphocytes, Absolute 3.29 (H) 0.70 - 3.10 10*3/mm3    Monocytes, Absolute 0.47 0.10 - 0.90 10*3/mm3    Eosinophils, Absolute 0.14 0.00 - 0.40 10*3/mm3    Basophils, Absolute 0.02 0.00 - 0.20 10*3/mm3   Urine Drug Screen - Urine, Clean Catch   Result Value Ref Range    Amphet/Methamphet, Screen Negative Negative    Barbiturates Screen, Urine Negative Negative    Benzodiazepine Screen, Urine Negative Negative    Cocaine Screen, Urine Negative Negative    Opiate Screen Negative Negative    THC, Screen, Urine Negative Negative    Methadone Screen, Urine Negative Negative    Oxycodone Screen, Urine Negative Negative   Pregnancy, Urine -  Urine, Clean Catch   Result Value Ref Range    HCG, Urine QL Negative Negative   Vitamin D 25 Hydroxy   Result Value Ref Range    25 Hydroxy, Vitamin D 47.7 30.0 - 100.0 ng/ml   TSH   Result Value Ref Range    TSH 1.220 0.270 - 4.200 mIU/mL   T4, Free   Result Value Ref Range    Free T4 1.53 0.93 - 1.70 ng/dL   Vitamin B12   Result Value Ref Range    Vitamin B-12 211 211 - 946 pg/mL   Comprehensive Metabolic Panel   Result Value Ref Range    Glucose 98 70 - 99 mg/dL    BUN 9 7 - 23 mg/dL    Creatinine 1.08 (H) 0.52 - 1.04 mg/dL    Sodium 140 137 - 145 mmol/L    Potassium 4.0 3.4 - 5.0 mmol/L    Chloride 104 101 - 112 mmol/L    CO2 27.0 22.0 - 30.0 mmol/L    Calcium 10.0 8.4 - 10.2 mg/dL    Total Protein 7.3 6.3 - 8.6 g/dL    Albumin 4.50 3.50 - 5.00 g/dL    ALT (SGPT) 14 <=35 U/L    AST (SGOT) 20 14 - 36 U/L    Alkaline Phosphatase 57 38 - 126 U/L    Total Bilirubin 0.7 0.2 - 1.3 mg/dL    eGFR Non  Amer 64 (L) 71 - 165 mL/min/1.73    Globulin 2.8 2.3 - 3.5 gm/dL    A/G Ratio 1.6 1.1 - 1.8 g/dL    BUN/Creatinine Ratio 8.3 7.0 - 25.0    Anion Gap 9.0 5.0 - 15.0 mmol/L   Results for orders placed or performed during the hospital encounter of 04/07/19   POC Urinalysis Dipstick, Multipro (Automated dipstick)   Result Value Ref Range    Color Dark Yellow Yellow, Straw, Dark Yellow, Yamile    Clarity, UA Cloudy (A) Clear    Glucose, UA Negative Negative, 1000 mg/dL (3+) mg/dL    Bilirubin Small (1+) (A) Negative    Ketones, UA Trace (A) Negative    Specific Gravity  1.030 1.005 - 1.030    Blood, UA Negative Negative    pH, Urine 5.5 5.0 - 8.0    Protein, POC 2+ (A) Negative mg/dL    Urobilinogen, UA Normal Normal    Nitrite, UA Negative Negative    Leukocytes Negative Negative   POCT Pregnancy, urine   Result Value Ref Range    HCG, Urine, QL Negative Negative    Lot Number [id number]     Internal Positive Control Positive     Internal Negative Control Negative    Results for orders placed or performed in visit on  10/29/18   Urinalysis, Microscopic Only - Urine, Clean Catch   Result Value Ref Range    RBC, UA Too Numerous to Count (A) None Seen /HPF    WBC, UA 3-5 (A) None Seen /HPF    Bacteria, UA 1+ (A) None Seen /HPF    Squamous Epithelial Cells, UA 7-12 (A) None Seen, 0-2 /HPF    Hyaline Casts, UA 0-2 None Seen /LPF    Methodology Manual Light Microscopy    Urinalysis With Culture If Indicated - Urine, Clean Catch   Result Value Ref Range    Color, UA Yamile (A) Yellow, Straw    Appearance, UA Cloudy (A) Clear    pH, UA <=5.0 (L) 5.5 - 8.0    Specific Gravity, UA >=1.030 1.005 - 1.030    Glucose, UA Negative Negative    Ketones, UA 15 mg/dL (1+) (A) Negative    Bilirubin, UA Large (3+) (A) Negative    Blood, UA Large (3+) (A) Negative    Protein,  mg/dL (2+) (A) Negative    Leuk Esterase, UA Negative Negative    Nitrite, UA Negative Negative    Urobilinogen, UA 1.0 E.U./dL 0.2 - 1.0 E.U./dL   CBC Auto Differential   Result Value Ref Range    WBC 3.90 3.20 - 9.80 10*3/mm3    RBC 5.10 3.77 - 5.16 10*6/mm3    Hemoglobin 14.6 12.0 - 15.5 g/dL    Hematocrit 43.8 35.0 - 45.0 %    MCV 85.9 80.0 - 98.0 fL    MCH 28.6 26.5 - 34.0 pg    MCHC 33.3 31.4 - 36.0 g/dL    RDW 13.2 11.5 - 14.5 %    RDW-SD 40.8 36.4 - 46.3 fl    MPV 10.4 8.0 - 12.0 fL    Platelets 243 150 - 450 10*3/mm3    Neutrophil % 47.1 37.0 - 80.0 %    Lymphocyte % 43.1 10.0 - 50.0 %    Monocyte % 8.5 0.0 - 12.0 %    Eosinophil % 1.0 0.0 - 7.0 %    Basophil % 0.3 0.0 - 2.0 %    Neutrophils, Absolute 1.84 (L) 2.00 - 8.60 10*3/mm3    Lymphocytes, Absolute 1.68 0.60 - 4.20 10*3/mm3    Monocytes, Absolute 0.33 0.00 - 0.90 10*3/mm3    Eosinophils, Absolute 0.04 0.00 - 0.70 10*3/mm3    Basophils, Absolute 0.01 0.00 - 0.20 10*3/mm3     *Note: Due to a large number of results and/or encounters for the requested time period, some results have not been displayed. A complete set of results can be found in Results Review.

## 2019-06-13 NOTE — PATIENT INSTRUCTIONS

## 2019-06-14 ENCOUNTER — OFFICE VISIT (OUTPATIENT)
Dept: OBSTETRICS AND GYNECOLOGY | Facility: CLINIC | Age: 19
End: 2019-06-14

## 2019-06-14 VITALS
DIASTOLIC BLOOD PRESSURE: 60 MMHG | SYSTOLIC BLOOD PRESSURE: 102 MMHG | BODY MASS INDEX: 18.39 KG/M2 | WEIGHT: 110.4 LBS | HEART RATE: 93 BPM | HEIGHT: 65 IN

## 2019-06-14 DIAGNOSIS — Z11.3 ROUTINE SCREENING FOR STI (SEXUALLY TRANSMITTED INFECTION): ICD-10-CM

## 2019-06-14 DIAGNOSIS — N92.1 BREAKTHROUGH BLEEDING ON DEPO PROVERA: ICD-10-CM

## 2019-06-14 DIAGNOSIS — B37.31 CANDIDA VAGINITIS: Primary | ICD-10-CM

## 2019-06-14 PROBLEM — N73.0 ACUTE PELVIC INFLAMMATORY DISEASE: Status: ACTIVE | Noted: 2019-06-14

## 2019-06-14 PROCEDURE — 87591 N.GONORRHOEAE DNA AMP PROB: CPT | Performed by: NURSE PRACTITIONER

## 2019-06-14 PROCEDURE — 87491 CHLMYD TRACH DNA AMP PROBE: CPT | Performed by: NURSE PRACTITIONER

## 2019-06-14 PROCEDURE — 87661 TRICHOMONAS VAGINALIS AMPLIF: CPT | Performed by: NURSE PRACTITIONER

## 2019-06-14 PROCEDURE — 99214 OFFICE O/P EST MOD 30 MIN: CPT | Performed by: NURSE PRACTITIONER

## 2019-06-14 PROCEDURE — 87210 SMEAR WET MOUNT SALINE/INK: CPT | Performed by: NURSE PRACTITIONER

## 2019-06-14 RX ORDER — FLUCONAZOLE 150 MG/1
150 TABLET ORAL ONCE
Qty: 2 TABLET | Refills: 0 | Status: SHIPPED | OUTPATIENT
Start: 2019-06-14 | End: 2019-06-14

## 2019-06-15 LAB
C TRACH RRNA CVX QL NAA+PROBE: NEGATIVE
N GONORRHOEA RRNA SPEC QL NAA+PROBE: NEGATIVE
TRICHOMONAS VAGINALIS PCR: NEGATIVE

## 2019-06-17 NOTE — PROGRESS NOTES
"Subjective   Mandi Walters is a 19 y.o. female.     History of Present Illness   Pt presents with itching, odor, burning and brown/light discharge. Pt cannot tell me how long this has been going on, can only say \"a while\". Then later states it \"just started\".     Pt believes she is having BTB on Depo Provera. She states she has been on Depo Provera since 15. I asked how she liked Depo Provera to which she responded \"that's all I am saying\". Typically pt denies bleeding. The brown discharge is what patient is saying is BTB. She has hx of BV. Pt declines STI testing today but after exam, I recommended it be sent on her urine.     Pt gets Depo Provera at the [facility]. She wants to have completed here now. They are closed today but we will call next week to get the records and dates for her next injection.     The following portions of the patient's history were reviewed and updated as appropriate: allergies, current medications, past family history, past medical history, past social history, past surgical history and problem list.    Review of Systems   Constitutional: Negative.  Negative for chills and fever.   Respiratory: Negative.    Cardiovascular: Negative.    Genitourinary: Positive for vaginal bleeding (\"brown spotting\"), vaginal discharge and vaginal pain (burning, itching, odor). Negative for dysuria, frequency, hematuria, pelvic pain and urgency.   Skin: Negative for rash.   Psychiatric/Behavioral: Positive for decreased concentration and positive for hyperactivity.       Objective    Vitals:    06/14/19 1405   BP: 102/60   Pulse: 93         06/14/19  1405   Weight: 50.1 kg (110 lb 6.4 oz)     Body mass index is 18.37 kg/m².    Physical Exam   Constitutional: She is oriented to person, place, and time. She appears well-developed and well-nourished.   Cardiovascular: Normal rate, regular rhythm and normal heart sounds.   Pulmonary/Chest: Effort normal and breath sounds normal.   Genitourinary: Uterus " "normal and cervix normal. There is no rash, tenderness, lesion or injury on the right labia. There is no rash, tenderness, lesion or injury on the left labia. Cervix does not exhibit motion tenderness. Right adnexum displays no mass, no tenderness and no fullness. Left adnexum displays no mass, no tenderness and no fullness. Vagina exhibits no lesion. No erythema, tenderness or bleeding in the vagina. No foreign body in the vagina. No signs of injury around the vagina. Vaginal discharge (small amount of moderately thick white discharge, odor noted, wet prep obtained.  ) found.   Genitourinary Comments: Wet prep: positive for a few yeast buds and WBC, no clue cells or trichomonads. Evaluated by ABRAM Montague.    Neurological: She is alert and oriented to person, place, and time.   Psychiatric: She is hyperactive. She expresses impulsivity. She is inattentive.   Vitals reviewed.        Assessment/Plan   Mandi was seen today for vaginitis and breakthrough bleeding on depo provera.    Diagnoses and all orders for this visit:    Candida vaginitis  -     fluconazole (DIFLUCAN) 150 MG tablet; Take 1 tablet by mouth 1 (One) Time for 1 dose. Repeat dose in 72 hours if still symptomatic    Breakthrough bleeding on depo provera  -     Chlamydia trachomatis, Neisseria gonorrhoeae, Trichomonas vaginalis, PCR - Urine, Urine, Clean Catch    Routine screening for STI (sexually transmitted infection)  -     Chlamydia trachomatis, Neisseria gonorrhoeae, Trichomonas vaginalis, PCR - Urine, Urine, Clean Catch      Discussed findings with pt. Treat with Diflucan 150mg, take one now and repeat it in 3 days. Discussed vulvar hygiene guidelines. G/C/T was negative. Monitor \"BTB\". Discussed that having some \"old blood\" type discharge can be normal on occasion. As long as it is not heavy or prolonged, monitor and see if it doesn't improve on it's own or with her next Depo Provera injection. Pt voices understanding.     We will " call her to schedule next Depo Provera injection upon receiving the records from [facility] next week.

## 2019-06-26 RX ORDER — PROMETHAZINE HYDROCHLORIDE 25 MG/1
25 TABLET ORAL DAILY PRN
Qty: 21 TABLET | Refills: 0 | Status: SHIPPED | OUTPATIENT
Start: 2019-06-26 | End: 2019-07-12 | Stop reason: SDUPTHER

## 2019-06-26 RX ORDER — RANITIDINE HCL 75 MG
75 TABLET ORAL 2 TIMES DAILY
Qty: 60 TABLET | Refills: 0 | Status: SHIPPED | OUTPATIENT
Start: 2019-06-26 | End: 2019-07-03 | Stop reason: SDUPTHER

## 2019-07-03 DIAGNOSIS — K21.9 GASTROESOPHAGEAL REFLUX DISEASE WITHOUT ESOPHAGITIS: Primary | ICD-10-CM

## 2019-07-03 RX ORDER — RANITIDINE HCL 75 MG
75 TABLET ORAL 2 TIMES DAILY
Qty: 60 TABLET | Refills: 5 | Status: SHIPPED | OUTPATIENT
Start: 2019-07-03 | End: 2019-07-31

## 2019-07-09 ENCOUNTER — CLINICAL SUPPORT (OUTPATIENT)
Dept: OBSTETRICS AND GYNECOLOGY | Facility: CLINIC | Age: 19
End: 2019-07-09

## 2019-07-09 VITALS — BODY MASS INDEX: 17.49 KG/M2 | WEIGHT: 105 LBS | HEIGHT: 65 IN

## 2019-07-09 DIAGNOSIS — Z30.42 SURVEILLANCE FOR DEPO-PROVERA CONTRACEPTION: Primary | ICD-10-CM

## 2019-07-09 PROCEDURE — 96372 THER/PROPH/DIAG INJ SC/IM: CPT | Performed by: NURSE PRACTITIONER

## 2019-07-09 RX ORDER — MEDROXYPROGESTERONE ACETATE 150 MG/ML
150 INJECTION, SUSPENSION INTRAMUSCULAR
Qty: 1 ML | Refills: 3 | Status: SHIPPED | OUTPATIENT
Start: 2019-07-09 | End: 2019-08-07

## 2019-07-09 RX ORDER — MEDROXYPROGESTERONE ACETATE 150 MG/ML
150 INJECTION, SUSPENSION INTRAMUSCULAR
Status: COMPLETED | OUTPATIENT
Start: 2019-07-09 | End: 2020-03-04

## 2019-07-09 RX ADMIN — MEDROXYPROGESTERONE ACETATE 150 MG: 150 INJECTION, SUSPENSION INTRAMUSCULAR at 14:35

## 2019-07-12 ENCOUNTER — TELEPHONE (OUTPATIENT)
Dept: FAMILY MEDICINE CLINIC | Facility: CLINIC | Age: 19
End: 2019-07-12

## 2019-07-12 DIAGNOSIS — R10.84 GENERALIZED ABDOMINAL PAIN: ICD-10-CM

## 2019-07-12 DIAGNOSIS — R11.0 NAUSEA: Primary | ICD-10-CM

## 2019-07-12 RX ORDER — PROMETHAZINE HYDROCHLORIDE 25 MG/1
25 TABLET ORAL DAILY PRN
Qty: 21 TABLET | Refills: 0 | Status: SHIPPED | OUTPATIENT
Start: 2019-07-12 | End: 2019-07-16 | Stop reason: SDUPTHER

## 2019-07-12 NOTE — TELEPHONE ENCOUNTER
----- Message from Eldon Morton Rep sent at 7/12/2019  1:09 PM CDT -----  Regarding: MEDICATION  promethazine (PHENERGAN) 25 MG tablet

## 2019-07-16 ENCOUNTER — CLINICAL SUPPORT (OUTPATIENT)
Dept: FAMILY MEDICINE CLINIC | Facility: CLINIC | Age: 19
End: 2019-07-16

## 2019-07-16 DIAGNOSIS — R10.84 GENERALIZED ABDOMINAL PAIN: ICD-10-CM

## 2019-07-16 DIAGNOSIS — K21.9 GASTROESOPHAGEAL REFLUX DISEASE WITHOUT ESOPHAGITIS: ICD-10-CM

## 2019-07-16 DIAGNOSIS — R11.0 NAUSEA: ICD-10-CM

## 2019-07-16 DIAGNOSIS — E53.8 B12 DEFICIENCY: ICD-10-CM

## 2019-07-16 PROCEDURE — 96372 THER/PROPH/DIAG INJ SC/IM: CPT | Performed by: NURSE PRACTITIONER

## 2019-07-16 RX ORDER — PROMETHAZINE HYDROCHLORIDE 25 MG/1
25 TABLET ORAL DAILY PRN
Qty: 30 TABLET | Refills: 5 | Status: SHIPPED | OUTPATIENT
Start: 2019-07-16 | End: 2019-08-29 | Stop reason: SDUPTHER

## 2019-07-16 RX ADMIN — CYANOCOBALAMIN 1000 MCG: 1000 INJECTION, SOLUTION INTRAMUSCULAR; SUBCUTANEOUS at 13:58

## 2019-07-23 ENCOUNTER — ANESTHESIA (OUTPATIENT)
Dept: GASTROENTEROLOGY | Facility: HOSPITAL | Age: 19
End: 2019-07-23

## 2019-07-23 ENCOUNTER — ANESTHESIA EVENT (OUTPATIENT)
Dept: GASTROENTEROLOGY | Facility: HOSPITAL | Age: 19
End: 2019-07-23

## 2019-07-23 ENCOUNTER — HOSPITAL ENCOUNTER (OUTPATIENT)
Facility: HOSPITAL | Age: 19
Setting detail: HOSPITAL OUTPATIENT SURGERY
Discharge: HOME | End: 2019-07-23
Attending: INTERNAL MEDICINE | Admitting: INTERNAL MEDICINE

## 2019-07-23 VITALS
HEIGHT: 65 IN | WEIGHT: 105.16 LBS | SYSTOLIC BLOOD PRESSURE: 105 MMHG | HEART RATE: 88 BPM | BODY MASS INDEX: 17.52 KG/M2 | RESPIRATION RATE: 14 BRPM | DIASTOLIC BLOOD PRESSURE: 56 MMHG | TEMPERATURE: 97.3 F | OXYGEN SATURATION: 98 %

## 2019-07-23 DIAGNOSIS — R10.10 UPPER ABDOMINAL PAIN: ICD-10-CM

## 2019-07-23 DIAGNOSIS — R11.0 NAUSEA: ICD-10-CM

## 2019-07-23 DIAGNOSIS — K21.9 GASTROESOPHAGEAL REFLUX DISEASE, ESOPHAGITIS PRESENCE NOT SPECIFIED: ICD-10-CM

## 2019-07-23 LAB — B-HCG UR QL: NEGATIVE

## 2019-07-23 PROCEDURE — 25010000002 PROPOFOL 10 MG/ML EMULSION: Performed by: NURSE ANESTHETIST, CERTIFIED REGISTERED

## 2019-07-23 PROCEDURE — 81025 URINE PREGNANCY TEST: CPT | Performed by: NURSE PRACTITIONER

## 2019-07-23 PROCEDURE — 43239 EGD BIOPSY SINGLE/MULTIPLE: CPT | Performed by: INTERNAL MEDICINE

## 2019-07-23 PROCEDURE — 88305 TISSUE EXAM BY PATHOLOGIST: CPT | Performed by: INTERNAL MEDICINE

## 2019-07-23 PROCEDURE — 88305 TISSUE EXAM BY PATHOLOGIST: CPT | Performed by: PATHOLOGY

## 2019-07-23 RX ORDER — DEXTROSE AND SODIUM CHLORIDE 5; .45 G/100ML; G/100ML
30 INJECTION, SOLUTION INTRAVENOUS CONTINUOUS PRN
Status: DISCONTINUED | OUTPATIENT
Start: 2019-07-23 | End: 2019-07-23 | Stop reason: HOSPADM

## 2019-07-23 RX ORDER — PROMETHAZINE HYDROCHLORIDE 25 MG/1
25 TABLET ORAL ONCE AS NEEDED
Status: DISCONTINUED | OUTPATIENT
Start: 2019-07-23 | End: 2019-07-23 | Stop reason: HOSPADM

## 2019-07-23 RX ORDER — DEXAMETHASONE SODIUM PHOSPHATE 4 MG/ML
8 INJECTION, SOLUTION INTRA-ARTICULAR; INTRALESIONAL; INTRAMUSCULAR; INTRAVENOUS; SOFT TISSUE ONCE AS NEEDED
Status: DISCONTINUED | OUTPATIENT
Start: 2019-07-23 | End: 2019-07-23

## 2019-07-23 RX ORDER — LIDOCAINE HYDROCHLORIDE 20 MG/ML
INJECTION, SOLUTION INTRAVENOUS AS NEEDED
Status: DISCONTINUED | OUTPATIENT
Start: 2019-07-23 | End: 2019-07-23 | Stop reason: SURG

## 2019-07-23 RX ORDER — PROMETHAZINE HYDROCHLORIDE 25 MG/1
25 SUPPOSITORY RECTAL ONCE AS NEEDED
Status: DISCONTINUED | OUTPATIENT
Start: 2019-07-23 | End: 2019-07-23 | Stop reason: HOSPADM

## 2019-07-23 RX ORDER — PROMETHAZINE HYDROCHLORIDE 25 MG/ML
12.5 INJECTION, SOLUTION INTRAMUSCULAR; INTRAVENOUS ONCE AS NEEDED
Status: DISCONTINUED | OUTPATIENT
Start: 2019-07-23 | End: 2019-07-23 | Stop reason: HOSPADM

## 2019-07-23 RX ORDER — PROPOFOL 10 MG/ML
VIAL (ML) INTRAVENOUS AS NEEDED
Status: DISCONTINUED | OUTPATIENT
Start: 2019-07-23 | End: 2019-07-23 | Stop reason: SURG

## 2019-07-23 RX ORDER — ONDANSETRON 2 MG/ML
4 INJECTION INTRAMUSCULAR; INTRAVENOUS ONCE AS NEEDED
Status: DISCONTINUED | OUTPATIENT
Start: 2019-07-23 | End: 2019-07-23 | Stop reason: HOSPADM

## 2019-07-23 RX ADMIN — PROPOFOL 50 MG: 10 INJECTION, EMULSION INTRAVENOUS at 14:31

## 2019-07-23 RX ADMIN — PROPOFOL 50 MG: 10 INJECTION, EMULSION INTRAVENOUS at 14:27

## 2019-07-23 RX ADMIN — DEXTROSE AND SODIUM CHLORIDE 30 ML/HR: 5; 450 INJECTION, SOLUTION INTRAVENOUS at 14:15

## 2019-07-23 RX ADMIN — LIDOCAINE HYDROCHLORIDE 50 MG: 20 INJECTION, SOLUTION INTRAVENOUS at 14:27

## 2019-07-23 RX ADMIN — PROPOFOL 50 MG: 10 INJECTION, EMULSION INTRAVENOUS at 14:32

## 2019-07-23 RX ADMIN — PROPOFOL 50 MG: 10 INJECTION, EMULSION INTRAVENOUS at 14:29

## 2019-07-23 NOTE — ANESTHESIA PREPROCEDURE EVALUATION
Anesthesia Evaluation     Patient summary reviewed   NPO Solid Status: > 8 hours  NPO Liquid Status: > 8 hours           Airway   Mallampati: III  TM distance: >3 FB  Neck ROM: full  No difficulty expected  Dental      Pulmonary - normal exam   Cardiovascular - normal exam        Neuro/Psych  GI/Hepatic/Renal/Endo      Musculoskeletal     Abdominal    Substance History      OB/GYN          Other                      Anesthesia Plan    ASA 3     MAC     intravenous induction   Anesthetic plan, all risks, benefits, and alternatives have been provided, discussed and informed consent has been obtained with: patient.

## 2019-07-23 NOTE — ANESTHESIA POSTPROCEDURE EVALUATION
Patient: Mandi Walters    Procedure Summary     Date:  07/23/19 Room / Location:  [facility] ENDOSCOPY 2 / [facility] ENDOSCOPY    Anesthesia Start:  1419 Anesthesia Stop:  1437    Procedure:  ESOPHAGOGASTRODUODENOSCOPY possible dilation (N/A ) Diagnosis:       Gastroesophageal reflux disease, esophagitis presence not specified      Nausea      Upper abdominal pain      (Gastroesophageal reflux disease, esophagitis presence not specified [K21.9])      (Nausea [R11.0])      (Upper abdominal pain [R10.10])    Surgeon:  Guicho Quiñonez MD Provider:  Matt Gerard CRNA    Anesthesia Type:  MAC ASA Status:  3          Anesthesia Type: MAC  Last vitals  BP   102/67 (07/23/19 1408)   Temp   97.6 °F (36.4 °C) (07/23/19 1408)   Pulse   102 (07/23/19 1408)   Resp   18 (07/23/19 1408)     SpO2   99 % (07/23/19 1408)     Post Anesthesia Care and Evaluation    Patient location during evaluation: bedside  Patient participation: complete - patient cannot participate  Level of consciousness: awake  Pain score: 0  Pain management: adequate  Airway patency: patent  Anesthetic complications: No anesthetic complications  PONV Status: none  Cardiovascular status: acceptable  Respiratory status: acceptable  Hydration status: acceptable

## 2019-07-23 NOTE — H&P
No chief complaint on file.      Subjective    Mandi Walters is a 19 y.o. female. she is here today for follow-up.    Abdominal Pain   This is a new problem. The current episode started more than 1 month ago. The onset quality is undetermined. The problem has been waxing and waning. The pain is located in the epigastric region. The pain is mild. The quality of the pain is cramping. Associated symptoms include headaches and nausea. Pertinent negatives include no arthralgias, constipation, diarrhea, fever or vomiting.   19-year-old female presents to discuss weight loss and abdominal pain.  Her weight is up 8 pounds from 5/7/2019.  States she has always had trouble with eating.  History is difficult to obtain patient is very vague with her answers.  Reports sometimes she has bowel movement sometimes she does not she does not see any blood and reports stool appears to be normal consistency.  States she is always hungry.  States a few years ago she had EGD in [facility] and was told she had a vessel rupture on her stomach that was causing bleeding and never followed up with it.  States Zantac helps reflux somewhat.  Plan; schedule patient for EGD I have also recommended colonoscopy due to abdominal pain however she declines to schedule colonoscopy and states she will not drink colon prep.  Follow-up after test return office sooner if needed          The following portions of the patient's history were reviewed and updated as appropriate:   Past Medical History:   Diagnosis Date   • Bacterial vaginosis    • Costal chondritis    • Fatigue    • GERD (gastroesophageal reflux disease)    • Hypoglycemia    • Malaise and fatigue    • Menorrhagia    • Well child visit      Past Surgical History:   Procedure Laterality Date   • TYMPANOSTOMY TUBE PLACEMENT      Tympanostomy tube-2 (1)      Family History   Problem Relation Age of Onset   • Osteoarthritis Mother    • Diabetes Maternal Grandmother    • Heart  disease Maternal Grandmother    • Osteoarthritis Maternal Grandmother    • Coronary artery disease Paternal Grandfather      OB History      Para Term  AB Living    1       1      SAB TAB Ectopic Molar Multiple Live Births    1                  Prior to Admission medications    Medication Sig Start Date End Date Taking? Authorizing Provider   medroxyPROGESTERone (DEPO-PROVERA) 150 MG/ML injection Inject 150 mg into the appropriate muscle as directed by prescriber.   Yes Provider, MD Fam   promethazine (PHENERGAN) 25 MG tablet Take 1 tablet by mouth Daily As Needed for Nausea or Vomiting. 6/10/19  Yes Idalmis Peter APRN   raNITIdine (ZANTAC) 75 MG tablet Take 1 tablet by mouth 2 (Two) Times a Day. 6/10/19  Yes Idalmis Peter APRN     Allergies   Allergen Reactions   • Amoxicillin      Anaphylaxis   • Codeine      Rash   • Loratadine      Unknown   • Penicillins      Anaphylaxis     Social History     Socioeconomic History   • Marital status: Single     Spouse name: Not on file   • Number of children: Not on file   • Years of education: Not on file   • Highest education level: Not on file   Tobacco Use   • Smoking status: Former Smoker     Packs/day: 1.00     Years: 7.00     Pack years: 7.00   • Smokeless tobacco: Never Used   Substance and Sexual Activity   • Alcohol use: No   • Drug use: No   • Sexual activity: Defer       Review of Systems  Review of Systems   Constitutional: Positive for fatigue. Negative for activity change, appetite change (hungry, cant eat as much as she wants due to pain d), chills, diaphoresis, fever and unexpected weight change.   HENT: Negative for sore throat and trouble swallowing.    Respiratory: Negative for shortness of breath.    Gastrointestinal: Positive for abdominal distention, abdominal pain and nausea. Negative for anal bleeding, blood in stool, constipation, diarrhea, rectal pain and vomiting.   Musculoskeletal: Negative for arthralgias.   Skin:  "Negative for pallor.   Neurological: Positive for headaches. Negative for light-headedness.        Ht 166.4 cm (65.5\")   Wt 47.6 kg (105 lb)   BMI 17.21 kg/m²     Objective    Physical Exam   Constitutional: She is oriented to person, place, and time. She appears well-developed and well-nourished. She is cooperative. No distress.   HENT:   Head: Normocephalic and atraumatic.   Neck: Normal range of motion. Neck supple. No thyromegaly present.   Cardiovascular: Normal rate, regular rhythm and normal heart sounds.   Pulmonary/Chest: Effort normal and breath sounds normal. She has no wheezes. She has no rhonchi. She has no rales.   Abdominal: Soft. Normal appearance and bowel sounds are normal. She exhibits no distension. There is no hepatosplenomegaly. There is tenderness in the right upper quadrant, epigastric area and left upper quadrant. There is no rigidity and no guarding. No hernia.   Lymphadenopathy:     She has no cervical adenopathy.   Neurological: She is alert and oriented to person, place, and time.   Skin: Skin is warm, dry and intact. No rash noted. No pallor.   Psychiatric: She has a normal mood and affect. Her speech is normal.     Lab on 05/07/2019   Component Date Value Ref Range Status   • Color, UA 05/07/2019 Yellow  Yellow, Straw Final   • Appearance, UA 05/07/2019 Clear  Clear Final   • pH, UA 05/07/2019 5.5  5.5 - 8.0 Final   • Specific Gravity, UA 05/07/2019 >=1.030  1.005 - 1.030 Final   • Glucose, UA 05/07/2019 Negative  Negative Final   • Ketones, UA 05/07/2019 Trace* Negative Final   • Bilirubin, UA 05/07/2019 Negative  Negative Final   • Blood, UA 05/07/2019 Negative  Negative Final   • Protein, UA 05/07/2019 Negative  Negative Final   • Leuk Esterase, UA 05/07/2019 Negative  Negative Final   • Nitrite, UA 05/07/2019 Negative  Negative Final   • Urobilinogen, UA 05/07/2019 1.0 E.U./dL  0.2 - 1.0 E.U./dL Final   • Amphet/Methamphet, Screen 05/07/2019 Negative  Negative Final   • " Barbiturates Screen, Urine 05/07/2019 Negative  Negative Final   • Benzodiazepine Screen, Urine 05/07/2019 Negative  Negative Final   • Cocaine Screen, Urine 05/07/2019 Negative  Negative Final   • Opiate Screen 05/07/2019 Negative  Negative Final   • THC, Screen, Urine 05/07/2019 Negative  Negative Final   • Methadone Screen, Urine 05/07/2019 Negative  Negative Final   • Oxycodone Screen, Urine 05/07/2019 Negative  Negative Final   • HCG, Urine QL 05/07/2019 Negative  Negative Final   • Glucose 05/07/2019 98  70 - 99 mg/dL Final   • BUN 05/07/2019 9  7 - 23 mg/dL Final   • Creatinine 05/07/2019 1.08* 0.52 - 1.04 mg/dL Final   • Sodium 05/07/2019 140  137 - 145 mmol/L Final   • Potassium 05/07/2019 4.0  3.4 - 5.0 mmol/L Final   • Chloride 05/07/2019 104  101 - 112 mmol/L Final   • CO2 05/07/2019 27.0  22.0 - 30.0 mmol/L Final   • Calcium 05/07/2019 10.0  8.4 - 10.2 mg/dL Final   • Total Protein 05/07/2019 7.3  6.3 - 8.6 g/dL Final   • Albumin 05/07/2019 4.50  3.50 - 5.00 g/dL Final   • ALT (SGPT) 05/07/2019 14  <=35 U/L Final   • AST (SGOT) 05/07/2019 20  14 - 36 U/L Final   • Alkaline Phosphatase 05/07/2019 57  38 - 126 U/L Final   • Total Bilirubin 05/07/2019 0.7  0.2 - 1.3 mg/dL Final   • eGFR Non African Amer 05/07/2019 64* 71 - 165 mL/min/1.73 Final   • Globulin 05/07/2019 2.8  2.3 - 3.5 gm/dL Final   • A/G Ratio 05/07/2019 1.6  1.1 - 1.8 g/dL Final   • BUN/Creatinine Ratio 05/07/2019 8.3  7.0 - 25.0 Final   • Anion Gap 05/07/2019 9.0  5.0 - 15.0 mmol/L Final   • Free T4 05/07/2019 1.53  0.93 - 1.70 ng/dL Final   • TSH 05/07/2019 1.220  0.270 - 4.200 mIU/mL Final   • Vitamin B-12 05/07/2019 211  211 - 946 pg/mL Final   • 25 Hydroxy, Vitamin D 05/07/2019 47.7  30.0 - 100.0 ng/ml Final   • WBC 05/07/2019 6.71  3.40 - 10.80 10*3/mm3 Final   • RBC 05/07/2019 4.99  3.77 - 5.28 10*6/mm3 Final   • Hemoglobin 05/07/2019 14.5  12.0 - 15.9 g/dL Final   • Hematocrit 05/07/2019 44.6  34.0 - 46.6 % Final   • MCV 05/07/2019  89.4  79.0 - 97.0 fL Final   • MCH 05/07/2019 29.1  26.6 - 33.0 pg Final   • MCHC 05/07/2019 32.5  31.5 - 35.7 g/dL Final   • RDW 05/07/2019 14.4  12.3 - 15.4 % Final   • RDW-SD 05/07/2019 45.8  37.0 - 54.0 fl Final   • MPV 05/07/2019 10.4  6.0 - 12.0 fL Final   • Platelets 05/07/2019 261  140 - 450 10*3/mm3 Final   • Neutrophil % 05/07/2019 41.6* 42.7 - 76.0 % Final   • Lymphocyte % 05/07/2019 49.0* 19.6 - 45.3 % Final   • Monocyte % 05/07/2019 7.0  5.0 - 12.0 % Final   • Eosinophil % 05/07/2019 2.1  0.3 - 6.2 % Final   • Basophil % 05/07/2019 0.3  0.0 - 1.5 % Final   • Neutrophils, Absolute 05/07/2019 2.79  1.70 - 7.00 10*3/mm3 Final   • Lymphocytes, Absolute 05/07/2019 3.29* 0.70 - 3.10 10*3/mm3 Final   • Monocytes, Absolute 05/07/2019 0.47  0.10 - 0.90 10*3/mm3 Final   • Eosinophils, Absolute 05/07/2019 0.14  0.00 - 0.40 10*3/mm3 Final   • Basophils, Absolute 05/07/2019 0.02  0.00 - 0.20 10*3/mm3 Final     Assessment/Plan      1. Gastroesophageal reflux disease, esophagitis presence not specified    2. Nausea    3. Upper abdominal pain    .       Orders placed during this encounter include:  Orders Placed This Encounter   Procedures   • Pregnancy, Urine -     If child bearing age and not had hysterectomy or tubal ligation, may obtain serum if unable to void     Standing Status:   Standing     Number of Occurrences:   1   • Implement Anesthesia Orders Day of Procedure     Standing Status:   Standing     Number of Occurrences:   1   • Obtain Informed Consent     Standing Status:   Standing     Number of Occurrences:   1     Order Specific Question:   Informed Consent Given For     Answer:   ESOPHAGOGASTRODUODENOSCOPY   • POC Glucose Once     Prior to Procedure on ALL Diabetic Patients     Standing Status:   Standing     Number of Occurrences:   1   • Insert Peripheral IV     Standing Status:   Standing     Number of Occurrences:   1       ESOPHAGOGASTRODUODENOSCOPY possible dilation (N/A)    Review and/or summary  of lab tests, radiology, procedures, medications. Review and summary of old records and obtaining of history. The risks and benefits of my recommendations, as well as other treatment options were discussed with the patient today. Questions were answered.    New Medications Ordered This Visit   Medications   • dextrose 5 % and sodium chloride 0.45 % infusion       Follow-up: No Follow-up on file.          This document has been electronically signed by Guicho Quiñonez MD on July 23, 2019 1:50 PM             Results for orders placed or performed in visit on 06/14/19   Chlamydia trachomatis, Neisseria gonorrhoeae, Trichomonas vaginalis, PCR - Urine, Urine, Clean Catch   Result Value Ref Range    Chlamydia DNA by PCR Negative Negative    Neisseria gonorrhoeae by PCR Negative Negative    Trichomonas vaginalis PCR Negative    Results for orders placed or performed in visit on 05/07/19   Urinalysis With Culture If Indicated - Urine, Clean Catch   Result Value Ref Range    Color, UA Yellow Yellow, Straw    Appearance, UA Clear Clear    pH, UA 5.5 5.5 - 8.0    Specific Gravity, UA >=1.030 1.005 - 1.030    Glucose, UA Negative Negative    Ketones, UA Trace (A) Negative    Bilirubin, UA Negative Negative    Blood, UA Negative Negative    Protein, UA Negative Negative    Leuk Esterase, UA Negative Negative    Nitrite, UA Negative Negative    Urobilinogen, UA 1.0 E.U./dL 0.2 - 1.0 E.U./dL   CBC Auto Differential   Result Value Ref Range    WBC 6.71 3.40 - 10.80 10*3/mm3    RBC 4.99 3.77 - 5.28 10*6/mm3    Hemoglobin 14.5 12.0 - 15.9 g/dL    Hematocrit 44.6 34.0 - 46.6 %    MCV 89.4 79.0 - 97.0 fL    MCH 29.1 26.6 - 33.0 pg    MCHC 32.5 31.5 - 35.7 g/dL    RDW 14.4 12.3 - 15.4 %    RDW-SD 45.8 37.0 - 54.0 fl    MPV 10.4 6.0 - 12.0 fL    Platelets 261 140 - 450 10*3/mm3    Neutrophil % 41.6 (L) 42.7 - 76.0 %    Lymphocyte % 49.0 (H) 19.6 - 45.3 %    Monocyte % 7.0 5.0 - 12.0 %    Eosinophil % 2.1 0.3 - 6.2 %    Basophil % 0.3  0.0 - 1.5 %    Neutrophils, Absolute 2.79 1.70 - 7.00 10*3/mm3    Lymphocytes, Absolute 3.29 (H) 0.70 - 3.10 10*3/mm3    Monocytes, Absolute 0.47 0.10 - 0.90 10*3/mm3    Eosinophils, Absolute 0.14 0.00 - 0.40 10*3/mm3    Basophils, Absolute 0.02 0.00 - 0.20 10*3/mm3   Urine Drug Screen - Urine, Clean Catch   Result Value Ref Range    Amphet/Methamphet, Screen Negative Negative    Barbiturates Screen, Urine Negative Negative    Benzodiazepine Screen, Urine Negative Negative    Cocaine Screen, Urine Negative Negative    Opiate Screen Negative Negative    THC, Screen, Urine Negative Negative    Methadone Screen, Urine Negative Negative    Oxycodone Screen, Urine Negative Negative   Pregnancy, Urine - Urine, Clean Catch   Result Value Ref Range    HCG, Urine QL Negative Negative   Vitamin D 25 Hydroxy   Result Value Ref Range    25 Hydroxy, Vitamin D 47.7 30.0 - 100.0 ng/ml   TSH   Result Value Ref Range    TSH 1.220 0.270 - 4.200 mIU/mL   T4, Free   Result Value Ref Range    Free T4 1.53 0.93 - 1.70 ng/dL   Vitamin B12   Result Value Ref Range    Vitamin B-12 211 211 - 946 pg/mL   Comprehensive Metabolic Panel   Result Value Ref Range    Glucose 98 70 - 99 mg/dL    BUN 9 7 - 23 mg/dL    Creatinine 1.08 (H) 0.52 - 1.04 mg/dL    Sodium 140 137 - 145 mmol/L    Potassium 4.0 3.4 - 5.0 mmol/L    Chloride 104 101 - 112 mmol/L    CO2 27.0 22.0 - 30.0 mmol/L    Calcium 10.0 8.4 - 10.2 mg/dL    Total Protein 7.3 6.3 - 8.6 g/dL    Albumin 4.50 3.50 - 5.00 g/dL    ALT (SGPT) 14 <=35 U/L    AST (SGOT) 20 14 - 36 U/L    Alkaline Phosphatase 57 38 - 126 U/L    Total Bilirubin 0.7 0.2 - 1.3 mg/dL    eGFR Non  Amer 64 (L) 71 - 165 mL/min/1.73    Globulin 2.8 2.3 - 3.5 gm/dL    A/G Ratio 1.6 1.1 - 1.8 g/dL    BUN/Creatinine Ratio 8.3 7.0 - 25.0    Anion Gap 9.0 5.0 - 15.0 mmol/L   Results for orders placed or performed during the hospital encounter of 04/07/19   POC Urinalysis Dipstick, Multipro (Automated dipstick)   Result  Value Ref Range    Color Dark Yellow Yellow, Straw, Dark Yellow, Yamile    Clarity, UA Cloudy (A) Clear    Glucose, UA Negative Negative, 1000 mg/dL (3+) mg/dL    Bilirubin Small (1+) (A) Negative    Ketones, UA Trace (A) Negative    Specific Gravity  1.030 1.005 - 1.030    Blood, UA Negative Negative    pH, Urine 5.5 5.0 - 8.0    Protein, POC 2+ (A) Negative mg/dL    Urobilinogen, UA Normal Normal    Nitrite, UA Negative Negative    Leukocytes Negative Negative   POCT Pregnancy, urine   Result Value Ref Range    HCG, Urine, QL Negative Negative    Lot Number [id number]     Internal Positive Control Positive     Internal Negative Control Negative    Results for orders placed or performed in visit on 10/29/18   Urinalysis, Microscopic Only - Urine, Clean Catch   Result Value Ref Range    RBC, UA Too Numerous to Count (A) None Seen /HPF    WBC, UA 3-5 (A) None Seen /HPF    Bacteria, UA 1+ (A) None Seen /HPF    Squamous Epithelial Cells, UA 7-12 (A) None Seen, 0-2 /HPF    Hyaline Casts, UA 0-2 None Seen /LPF    Methodology Manual Light Microscopy    Urinalysis With Culture If Indicated - Urine, Clean Catch   Result Value Ref Range    Color, UA [place]ile (A) Yellow, Straw    Appearance, UA Cloudy (A) Clear    pH, UA <=5.0 (L) 5.5 - 8.0    Specific Gravity, UA >=1.030 1.005 - 1.030    Glucose, UA Negative Negative    Ketones, UA 15 mg/dL (1+) (A) Negative    Bilirubin, UA Large (3+) (A) Negative    Blood, UA Large (3+) (A) Negative    Protein,  mg/dL (2+) (A) Negative    Leuk Esterase, UA Negative Negative    Nitrite, UA Negative Negative    Urobilinogen, UA 1.0 E.U./dL 0.2 - 1.0 E.U./dL     *Note: Due to a large number of results and/or encounters for the requested time period, some results have not been displayed. A complete set of results can be found in Results Review.

## 2019-07-25 LAB
LAB AP CASE REPORT: NORMAL
PATH REPORT.FINAL DX SPEC: NORMAL
PATH REPORT.GROSS SPEC: NORMAL

## 2019-07-31 ENCOUNTER — OFFICE VISIT (OUTPATIENT)
Dept: GASTROENTEROLOGY | Facility: CLINIC | Age: 19
End: 2019-07-31

## 2019-07-31 VITALS
DIASTOLIC BLOOD PRESSURE: 60 MMHG | SYSTOLIC BLOOD PRESSURE: 108 MMHG | WEIGHT: 107.6 LBS | BODY MASS INDEX: 17.93 KG/M2 | HEART RATE: 82 BPM | HEIGHT: 65 IN

## 2019-07-31 DIAGNOSIS — K21.00 GASTROESOPHAGEAL REFLUX DISEASE WITH ESOPHAGITIS: Primary | ICD-10-CM

## 2019-07-31 DIAGNOSIS — R11.0 NAUSEA: ICD-10-CM

## 2019-07-31 PROCEDURE — 99213 OFFICE O/P EST LOW 20 MIN: CPT | Performed by: NURSE PRACTITIONER

## 2019-07-31 RX ORDER — PANTOPRAZOLE SODIUM 40 MG/1
40 TABLET, DELAYED RELEASE ORAL DAILY
Qty: 30 TABLET | Refills: 1 | Status: SHIPPED | OUTPATIENT
Start: 2019-07-31 | End: 2019-08-29 | Stop reason: SDUPTHER

## 2019-07-31 NOTE — PROGRESS NOTES
Chief Complaint   Patient presents with   • Abdominal Pain       Subjective    Mandi Walters is a 19 y.o. female. she is here today for follow-up.   19-year-old female presents to discuss abdominal pain and nausea.  States symptoms have been better recently and denies any severe abdominal pain but states it occurs intermittently she still has some trouble with eating due to pain with eating.  She has been taking Zantac twice daily with no significant change in symptoms.  Very vague when she describes symptoms.  Her weight is up 2 pounds from last office visit.    Heartburn   She complains of abdominal pain, belching and heartburn. She reports no chest pain, no choking, no coughing, no dysphagia, no early satiety, no globus sensation, no hoarse voice, no nausea or no sore throat. This is a chronic problem. The current episode started more than 1 month ago. The problem occurs occasionally. The problem has been waxing and waning. The heartburn duration is less than a minute. The symptoms are aggravated by certain foods, exertion and lying down. Pertinent negatives include no fatigue. She has tried a PPI for the symptoms.     EGD noted mildly severe esophagitis, gastritis and normal duodenum. duodenal and antrum biopsy noted no significant histologic abnormality antrum biopsy showed no significant abnormality.  esophagus biopsy noted benign squamous mucosa  Plan; discussed importance of standard antireflux measures continual avoidance of spicy greasy food recommend smoking cessation, reports she smokes 1 cigarette about every 3 days.  Will start patient on Protonix 40 mg a day for 1 month and then try to discontinue.  Follow-up in 1 month for recheck return office sooner if needed       The following portions of the patient's history were reviewed and updated as appropriate:   Past Medical History:   Diagnosis Date   • Bacterial vaginosis    • Costal chondritis    • Fatigue    • GERD (gastroesophageal reflux  disease)    • Hypoglycemia    • Malaise and fatigue    • Menorrhagia    • Well child visit      Past Surgical History:   Procedure Laterality Date   • ENDOSCOPY N/A 2019    Procedure: ESOPHAGOGASTRODUODENOSCOPY possible dilation;  Surgeon: Guicho Quiñonez MD;  Location: [facility] ENDOSCOPY;  Service: Gastroenterology   • TYMPANOSTOMY TUBE PLACEMENT      Tympanostomy tube-2 (1)      Family History   Problem Relation Age of Onset   • Osteoarthritis Mother    • Diabetes Maternal Grandmother    • Heart disease Maternal Grandmother    • Osteoarthritis Maternal Grandmother    • Coronary artery disease Paternal Grandfather      OB History      Para Term  AB Living    1       1      SAB TAB Ectopic Molar Multiple Live Births    1                  Prior to Admission medications    Medication Sig Start Date End Date Taking? Authorizing Provider   medroxyPROGESTERone (DEPO-PROVERA) 150 MG/ML injection Inject 1 mL into the appropriate muscle as directed by prescriber Every 3 (Three) Months. 19  Yes Tena Porras APRN   promethazine (PHENERGAN) 25 MG tablet Take 1 tablet by mouth Daily As Needed for Nausea or Vomiting. 19  Yes Idalmis Peter APRN   raNITIdine (ZANTAC) 75 MG tablet Take 1 tablet by mouth 2 (Two) Times a Day. 7/3/19  Yes Idalmis Peter APRN     Allergies   Allergen Reactions   • Amoxicillin      Anaphylaxis   • Codeine      Rash   • Loratadine      Unknown   • Penicillins      Anaphylaxis     Social History     Socioeconomic History   • Marital status: Single     Spouse name: Not on file   • Number of children: Not on file   • Years of education: Not on file   • Highest education level: Not on file   Tobacco Use   • Smoking status: Former Smoker     Packs/day: 1.00     Years: 7.00     Pack years: 7.00   • Smokeless tobacco: Never Used   Substance and Sexual Activity   • Alcohol use: No   • Drug use: No   • Sexual activity: Defer       Review of Systems  Review of  "Systems   Constitutional: Negative for activity change, appetite change, chills, diaphoresis, fatigue, fever and unexpected weight change.   HENT: Negative for hoarse voice, sore throat and trouble swallowing.    Respiratory: Negative for cough, choking and shortness of breath.    Cardiovascular: Negative for chest pain.   Gastrointestinal: Positive for abdominal pain and heartburn. Negative for abdominal distention, anal bleeding, blood in stool, constipation, diarrhea, dysphagia, nausea, rectal pain and vomiting.   Musculoskeletal: Negative for arthralgias.   Skin: Negative for pallor.   Neurological: Negative for light-headedness.        /60 (BP Location: Left arm)   Pulse 82   Ht 165.1 cm (65\")   Wt 48.8 kg (107 lb 9.6 oz)   BMI 17.91 kg/m²     Objective    Physical Exam   Constitutional: She is oriented to person, place, and time. She appears well-developed and well-nourished. She is cooperative. No distress.   HENT:   Head: Normocephalic and atraumatic.   Neck: Normal range of motion. Neck supple. No thyromegaly present.   Cardiovascular: Normal rate, regular rhythm and normal heart sounds.   Pulmonary/Chest: Effort normal and breath sounds normal. She has no wheezes. She has no rhonchi. She has no rales.   Abdominal: Soft. Normal appearance and bowel sounds are normal. She exhibits no distension. There is no hepatosplenomegaly. There is tenderness in the epigastric area and left upper quadrant. There is no rigidity and no guarding. No hernia.   Lymphadenopathy:     She has no cervical adenopathy.   Neurological: She is alert and oriented to person, place, and time.   Skin: Skin is warm, dry and intact. No rash noted. No pallor.   Psychiatric: She has a normal mood and affect. Her speech is normal.     Admission on 07/23/2019, Discharged on 07/23/2019   Component Date Value Ref Range Status   • HCG, Urine QL 07/23/2019 Negative  Negative Final   • Case Report 07/23/2019    Final                    " Value:Surgical Pathology Report                         Case: [id number]                                  Authorizing Provider:  Guicho Quiñonez MD        Collected:           07/23/2019 02:33 PM          Ordering Location:     [facility]             Received:            07/24/2019 06:48 AM                                 [facility]                                                     Pathologist:           Fabio Jacobs MD                                                           Specimens:   1) - Small Intestine, Duodenum, small bowel bx                                                      2) - Gastric, Antrum, antrum bx                                                                     3) - Esophagus, Distal, distal esophagus bx                                               • Final Diagnosis 07/23/2019    Final                    Value:This result contains rich text formatting which cannot be displayed here.   • Gross Description 07/23/2019    Final                    Value:This result contains rich text formatting which cannot be displayed here.     Assessment/Plan      1. Gastroesophageal reflux disease with esophagitis    2. Nausea    .       Orders placed during this encounter include:  No orders of the defined types were placed in this encounter.      * Surgery not found *    Review and/or summary of lab tests, radiology, procedures, medications. Review and summary of old records and obtaining of history. The risks and benefits of my recommendations, as well as other treatment options were discussed with the patient today. Questions were answered.    New Medications Ordered This Visit   Medications   • pantoprazole (PROTONIX) 40 MG EC tablet     Sig: Take 1 tablet by mouth Daily.     Dispense:  30 tablet     Refill:  1       Follow-up: Return in about 4 weeks (around 8/28/2019).          This document has been electronically signed by ANH Xiao on July 31, 2019 12:57 PM              Results for orders placed or performed during the hospital encounter of 07/23/19   Tissue Pathology Exam   Result Value Ref Range    Case Report       Surgical Pathology Report                         Case: [id number]                                  Authorizing Provider:  Guicho Quiñonez MD        Collected:           07/23/2019 02:33 PM          Ordering Location:     [facility]             Received:            07/24/2019 06:48 AM                                 [facility]                                                     Pathologist:           Fabio Jacobs MD                                                           Specimens:   1) - Small Intestine, Duodenum, small bowel bx                                                      2) - Gastric, Antrum, antrum bx                                                                     3) - Esophagus, Distal, distal esophagus bx                                                Final Diagnosis       1.  SMALL BOWEL, BIOPSY:  NO SIGNIFICANT HISTOLOGIC ABNORMALITY.    2.  GASTRIC ANTRUM, BIOPSY:  NO SIGNIFICANT HISTOLOGIC ABNORMALITY.    3.  DISTAL ESOPHAGUS, BIOPSY:  BENIGN SQUAMOUS MUCOSA.      Gross Description       1.  Two tan fragments measure 0.8 x 0.5 x 0.2 cm together.  Totally submitted.    2.  Two tan fragments measure 0.5 x 0.5 x 0.2 cm together.  Totally submitted.    3.  Two gray fragments measure 0.8 x 0.7 x 0.1 cm together.  Totally submitted.         Pregnancy, Urine -   Result Value Ref Range    HCG, Urine QL Negative Negative   Results for orders placed or performed in visit on 06/14/19   Chlamydia trachomatis, Neisseria gonorrhoeae, Trichomonas vaginalis, PCR - Urine, Urine, Clean Catch   Result Value Ref Range    Chlamydia DNA by PCR Negative Negative    Neisseria gonorrhoeae by PCR Negative Negative    Trichomonas vaginalis PCR Negative    Results for orders placed or performed in visit on 05/07/19   Urinalysis With Culture If  Indicated - Urine, Clean Catch   Result Value Ref Range    Color, UA Yellow Yellow, Straw    Appearance, UA Clear Clear    pH, UA 5.5 5.5 - 8.0    Specific Gravity, UA >=1.030 1.005 - 1.030    Glucose, UA Negative Negative    Ketones, UA Trace (A) Negative    Bilirubin, UA Negative Negative    Blood, UA Negative Negative    Protein, UA Negative Negative    Leuk Esterase, UA Negative Negative    Nitrite, UA Negative Negative    Urobilinogen, UA 1.0 E.U./dL 0.2 - 1.0 E.U./dL   CBC Auto Differential   Result Value Ref Range    WBC 6.71 3.40 - 10.80 10*3/mm3    RBC 4.99 3.77 - 5.28 10*6/mm3    Hemoglobin 14.5 12.0 - 15.9 g/dL    Hematocrit 44.6 34.0 - 46.6 %    MCV 89.4 79.0 - 97.0 fL    MCH 29.1 26.6 - 33.0 pg    MCHC 32.5 31.5 - 35.7 g/dL    RDW 14.4 12.3 - 15.4 %    RDW-SD 45.8 37.0 - 54.0 fl    MPV 10.4 6.0 - 12.0 fL    Platelets 261 140 - 450 10*3/mm3    Neutrophil % 41.6 (L) 42.7 - 76.0 %    Lymphocyte % 49.0 (H) 19.6 - 45.3 %    Monocyte % 7.0 5.0 - 12.0 %    Eosinophil % 2.1 0.3 - 6.2 %    Basophil % 0.3 0.0 - 1.5 %    Neutrophils, Absolute 2.79 1.70 - 7.00 10*3/mm3    Lymphocytes, Absolute 3.29 (H) 0.70 - 3.10 10*3/mm3    Monocytes, Absolute 0.47 0.10 - 0.90 10*3/mm3    Eosinophils, Absolute 0.14 0.00 - 0.40 10*3/mm3    Basophils, Absolute 0.02 0.00 - 0.20 10*3/mm3   Urine Drug Screen - Urine, Clean Catch   Result Value Ref Range    Amphet/Methamphet, Screen Negative Negative    Barbiturates Screen, Urine Negative Negative    Benzodiazepine Screen, Urine Negative Negative    Cocaine Screen, Urine Negative Negative    Opiate Screen Negative Negative    THC, Screen, Urine Negative Negative    Methadone Screen, Urine Negative Negative    Oxycodone Screen, Urine Negative Negative   Pregnancy, Urine - Urine, Clean Catch   Result Value Ref Range    HCG, Urine QL Negative Negative   Vitamin D 25 Hydroxy   Result Value Ref Range    25 Hydroxy, Vitamin D 47.7 30.0 - 100.0 ng/ml   TSH   Result Value Ref Range    TSH  1.220 0.270 - 4.200 mIU/mL   T4, Free   Result Value Ref Range    Free T4 1.53 0.93 - 1.70 ng/dL   Vitamin B12   Result Value Ref Range    Vitamin B-12 211 211 - 946 pg/mL   Comprehensive Metabolic Panel   Result Value Ref Range    Glucose 98 70 - 99 mg/dL    BUN 9 7 - 23 mg/dL    Creatinine 1.08 (H) 0.52 - 1.04 mg/dL    Sodium 140 137 - 145 mmol/L    Potassium 4.0 3.4 - 5.0 mmol/L    Chloride 104 101 - 112 mmol/L    CO2 27.0 22.0 - 30.0 mmol/L    Calcium 10.0 8.4 - 10.2 mg/dL    Total Protein 7.3 6.3 - 8.6 g/dL    Albumin 4.50 3.50 - 5.00 g/dL    ALT (SGPT) 14 <=35 U/L    AST (SGOT) 20 14 - 36 U/L    Alkaline Phosphatase 57 38 - 126 U/L    Total Bilirubin 0.7 0.2 - 1.3 mg/dL    eGFR Non  Amer 64 (L) 71 - 165 mL/min/1.73    Globulin 2.8 2.3 - 3.5 gm/dL    A/G Ratio 1.6 1.1 - 1.8 g/dL    BUN/Creatinine Ratio 8.3 7.0 - 25.0    Anion Gap 9.0 5.0 - 15.0 mmol/L   Results for orders placed or performed during the hospital encounter of 04/07/19   POC Urinalysis Dipstick, Multipro (Automated dipstick)   Result Value Ref Range    Color Dark Yellow Yellow, Straw, Dark Yellow, Yamile    Clarity, UA Cloudy (A) Clear    Glucose, UA Negative Negative, 1000 mg/dL (3+) mg/dL    Bilirubin Small (1+) (A) Negative    Ketones, UA Trace (A) Negative    Specific Gravity  1.030 1.005 - 1.030    Blood, UA Negative Negative    pH, Urine 5.5 5.0 - 8.0    Protein, POC 2+ (A) Negative mg/dL    Urobilinogen, UA Normal Normal    Nitrite, UA Negative Negative    Leukocytes Negative Negative   POCT Pregnancy, urine   Result Value Ref Range    HCG, Urine, QL Negative Negative    Lot Number [id number]     Internal Positive Control Positive     Internal Negative Control Negative    Results for orders placed or performed in visit on 10/29/18   Urinalysis, Microscopic Only - Urine, Clean Catch   Result Value Ref Range    RBC, UA Too Numerous to Count (A) None Seen /HPF    WBC, UA 3-5 (A) None Seen /HPF    Bacteria, UA 1+ (A) None Seen /HPF     Squamous Epithelial Cells, UA 7-12 (A) None Seen, 0-2 /HPF    Hyaline Casts, UA 0-2 None Seen /LPF    Methodology Manual Light Microscopy    Urinalysis With Culture If Indicated - Urine, Clean Catch   Result Value Ref Range    Color, UA Yamile (A) Yellow, Straw    Appearance, UA Cloudy (A) Clear    pH, UA <=5.0 (L) 5.5 - 8.0    Specific Gravity, UA >=1.030 1.005 - 1.030    Glucose, UA Negative Negative    Ketones, UA 15 mg/dL (1+) (A) Negative    Bilirubin, UA Large (3+) (A) Negative    Blood, UA Large (3+) (A) Negative    Protein,  mg/dL (2+) (A) Negative    Leuk Esterase, UA Negative Negative    Nitrite, UA Negative Negative    Urobilinogen, UA 1.0 E.U./dL 0.2 - 1.0 E.U./dL     *Note: Due to a large number of results and/or encounters for the requested time period, some results have not been displayed. A complete set of results can be found in Results Review.

## 2019-07-31 NOTE — PATIENT INSTRUCTIONS

## 2019-08-20 ENCOUNTER — CLINICAL SUPPORT (OUTPATIENT)
Dept: FAMILY MEDICINE CLINIC | Facility: CLINIC | Age: 19
End: 2019-08-20

## 2019-08-20 DIAGNOSIS — E53.8 B12 DEFICIENCY: ICD-10-CM

## 2019-08-20 PROCEDURE — 96372 THER/PROPH/DIAG INJ SC/IM: CPT | Performed by: NURSE PRACTITIONER

## 2019-08-20 RX ADMIN — CYANOCOBALAMIN 1000 MCG: 1000 INJECTION, SOLUTION INTRAMUSCULAR; SUBCUTANEOUS at 11:41

## 2019-08-29 ENCOUNTER — OFFICE VISIT (OUTPATIENT)
Dept: GASTROENTEROLOGY | Facility: CLINIC | Age: 19
End: 2019-08-29

## 2019-08-29 VITALS
HEIGHT: 66 IN | BODY MASS INDEX: 17.61 KG/M2 | DIASTOLIC BLOOD PRESSURE: 58 MMHG | HEART RATE: 88 BPM | WEIGHT: 109.6 LBS | SYSTOLIC BLOOD PRESSURE: 100 MMHG

## 2019-08-29 DIAGNOSIS — R11.0 NAUSEA: Primary | ICD-10-CM

## 2019-08-29 DIAGNOSIS — K21.9 GASTROESOPHAGEAL REFLUX DISEASE WITHOUT ESOPHAGITIS: ICD-10-CM

## 2019-08-29 DIAGNOSIS — R10.84 GENERALIZED ABDOMINAL PAIN: ICD-10-CM

## 2019-08-29 PROCEDURE — 99213 OFFICE O/P EST LOW 20 MIN: CPT | Performed by: NURSE PRACTITIONER

## 2019-08-29 RX ORDER — PANTOPRAZOLE SODIUM 40 MG/1
40 TABLET, DELAYED RELEASE ORAL DAILY
Qty: 30 TABLET | Refills: 1 | Status: SHIPPED | OUTPATIENT
Start: 2019-08-29 | End: 2019-10-22

## 2019-08-29 RX ORDER — PROMETHAZINE HYDROCHLORIDE 25 MG/1
25 TABLET ORAL DAILY PRN
Qty: 30 TABLET | Refills: 5 | OUTPATIENT
Start: 2019-08-29 | End: 2020-03-02

## 2019-08-29 NOTE — PATIENT INSTRUCTIONS
Heartburn    Heartburn is a type of pain or discomfort that can happen in the throat or chest. It is often described as a burning pain. It may also cause a bad taste in the mouth. Heartburn may feel worse when you lie down or bend over. It may be caused by stomach contents that move back up (reflux) into the tube that connects the mouth with the stomach (esophagus).  Follow these instructions at home:  Take these actions to lessen your discomfort and to help avoid problems.  Diet  · Follow a diet as told by your doctor. You may need to avoid foods and drinks such as:  ? Coffee and tea (with or without caffeine).  ? Drinks that contain alcohol.  ? Energy drinks and sports drinks.  ? Carbonated drinks or sodas.  ? Chocolate and cocoa.  ? Peppermint and mint flavorings.  ? Garlic and onions.  ? Horseradish.  ? Spicy and acidic foods, such as peppers, chili powder, marina powder, vinegar, hot sauces, and BBQ sauce.  ? Citrus fruit juices and citrus fruits, such as oranges, sukhdeep, and limes.  ? Tomato-based foods, such as red sauce, chili, salsa, and pizza with red sauce.  ? Fried and fatty foods, such as donuts, french fries, potato chips, and high-fat dressings.  ? High-fat meats, such as hot dogs, rib eye steak, sausage, ham, and roth.  ? High-fat dairy items, such as whole milk, butter, and cream cheese.  · Eat small meals often. Avoid eating large meals.  · Avoid drinking large amounts of liquid with your meals.  · Avoid eating meals during the 2-3 hours before bedtime.  · Avoid lying down right after you eat.  · Do not exercise right after you eat.  General instructions  · Pay attention to any changes in your symptoms.  · Take over-the-counter and prescription medicines only as told by your doctor. Do not take aspirin, ibuprofen, or other NSAIDs unless your doctor says it is okay.  · Do not use any tobacco products, including cigarettes, chewing tobacco, and e-cigarettes. If you need help quitting, ask your  doctor.  · Wear loose clothes. Do not wear anything tight around your waist.  · Raise (elevate) the head of your bed about 6 inches (15 cm).  · Try to lower your stress. If you need help doing this, ask your doctor.  · If you are overweight, lose an amount of weight that is healthy for you. Ask your doctor about a safe weight loss goal.  · Keep all follow-up visits as told by your doctor. This is important.  Contact a doctor if:  · You have new symptoms.  · You lose weight and you do not know why it is happening.  · You have trouble swallowing, or it hurts to swallow.  · You have wheezing or a cough that keeps happening.  · Your symptoms do not get better with treatment.  · You have heartburn often for more than two weeks.  Get help right away if:  · You have pain in your arms, neck, jaw, teeth, or back.  · You feel sweaty, dizzy, or light-headed.  · You have chest pain or shortness of breath.  · You throw up (vomit) and your throw up looks like blood or coffee grounds.  · Your poop (stool) is bloody or black.  This information is not intended to replace advice given to you by your health care provider. Make sure you discuss any questions you have with your health care provider.  Document Released: 08/29/2012 Document Revised: 05/25/2017 Document Reviewed: 04/13/2016  [employer] Patient Education © 2019 [employer]

## 2019-08-29 NOTE — PROGRESS NOTES
Chief Complaint   Patient presents with   • Abdominal Pain   • Heartburn       Subjective    Mandi Walters is a 19 y.o. female. she is here today for follow-up.    History of Present Illness  19-year-old female presents to discuss abdominal pain reflux and nausea.  Previously reports a decreased appetite states she is trying to eat very frequently her weight is actually up 3 pounds from last office visit.  Still  reports some intermittent abdominal pain though states Protonix has significantly improved symptoms.  She is very concerned about her constant nausea and states she needs to take Phenergan 1-2 times per day and it does not make her very sleepy at all.  She continues to smoke cigarettes.   Her EGD was completed in July 2019 and noted mildly severe esophagitis gastritis and normal duodenum.  Biopsies were normal.  Plan; discussed with patient I prefer she not take Phenergan every day.  we will schedule her for gastric empty study to rule out gastroparesis I recommended small frequent meals and avoidance of gastric irritants continue on Protonix daily.       The following portions of the patient's history were reviewed and updated as appropriate:   Past Medical History:   Diagnosis Date   • Bacterial vaginosis    • Costal chondritis    • Fatigue    • GERD (gastroesophageal reflux disease)    • Hypoglycemia    • Malaise and fatigue    • Menorrhagia    • Well child visit      Past Surgical History:   Procedure Laterality Date   • ENDOSCOPY N/A 7/23/2019    Procedure: ESOPHAGOGASTRODUODENOSCOPY possible dilation;  Surgeon: Guicho Quiñonez MD;  Location: [facility] ENDOSCOPY;  Service: Gastroenterology   • TYMPANOSTOMY TUBE PLACEMENT      Tympanostomy tube-2 (1)      Family History   Problem Relation Age of Onset   • Osteoarthritis Mother    • Diabetes Maternal Grandmother    • Heart disease Maternal Grandmother    • Osteoarthritis Maternal Grandmother    • Coronary artery disease Paternal Grandfather      OB  History      Para Term  AB Living    1       1      SAB TAB Ectopic Molar Multiple Live Births    1                  Prior to Admission medications    Medication Sig Start Date End Date Taking? Authorizing Provider   cyclobenzaprine (FLEXERIL) 5 MG tablet Take 1 tablet by mouth 3 (Three) Times a Day As Needed for Muscle Spasms. 19  Yes Milind Vigil MD   pantoprazole (PROTONIX) 40 MG EC tablet Take 1 tablet by mouth Daily. 19  Yes Sherrell Bull APRN   promethazine (PHENERGAN) 25 MG tablet Take 1 tablet by mouth Daily As Needed for Nausea or Vomiting. 19  Yes Idalmis Peter APRN   guaiFENesin (MUCINEX) 600 MG 12 hr tablet Take 1 tablet by mouth 2 (Two) Times a Day. 19   Milind Vigil MD     Allergies   Allergen Reactions   • Amoxicillin      Anaphylaxis   • Codeine      Rash   • Loratadine      Unknown   • Penicillins      Anaphylaxis     Social History     Socioeconomic History   • Marital status: Single     Spouse name: Not on file   • Number of children: Not on file   • Years of education: Not on file   • Highest education level: Not on file   Tobacco Use   • Smoking status: Former Smoker     Packs/day: 1.00     Years: 7.00     Pack years: 7.00   • Smokeless tobacco: Never Used   Substance and Sexual Activity   • Alcohol use: No   • Drug use: No   • Sexual activity: Defer       Review of Systems  Review of Systems   Constitutional: Negative for activity change, appetite change, chills, diaphoresis, fatigue, fever and unexpected weight change.   HENT: Negative for sore throat and trouble swallowing.    Respiratory: Negative for shortness of breath.    Gastrointestinal: Positive for constipation and nausea. Negative for abdominal distention, abdominal pain, anal bleeding, blood in stool, diarrhea, rectal pain and vomiting.   Musculoskeletal: Negative for arthralgias.   Skin: Negative for pallor.   Neurological: Negative for light-headedness.        /58  "(BP Location: Left arm)   Pulse 88   Ht 167.6 cm (66\")   Wt 49.7 kg (109 lb 9.6 oz)   BMI 17.69 kg/m²     Objective    Physical Exam   Constitutional: She is oriented to person, place, and time. She appears well-developed and well-nourished. She is cooperative. No distress.   HENT:   Head: Normocephalic and atraumatic.   Neck: Normal range of motion. Neck supple. No thyromegaly present.   Cardiovascular: Normal rate, regular rhythm and normal heart sounds.   Pulmonary/Chest: Effort normal and breath sounds normal. She has no wheezes. She has no rhonchi. She has no rales.   Abdominal: Soft. Normal appearance and bowel sounds are normal. She exhibits no distension. There is no hepatosplenomegaly. There is tenderness in the epigastric area and suprapubic area. There is no rigidity and no guarding. No hernia.   Lymphadenopathy:     She has no cervical adenopathy.   Neurological: She is alert and oriented to person, place, and time.   Skin: Skin is warm, dry and intact. No rash noted. No pallor.   Psychiatric: She has a normal mood and affect. Her speech is normal.     Admission on 07/23/2019, Discharged on 07/23/2019   Component Date Value Ref Range Status   • HCG, Urine QL 07/23/2019 Negative  Negative Final   • Case Report 07/23/2019    Final                    Value:Surgical Pathology Report                         Case: [id number]                                  Authorizing Provider:  Guicho Quiñonez MD        Collected:           07/23/2019 02:33 PM          Ordering Location:     [facility]             Received:            07/24/2019 06:48 AM                                 [facility]                                                     Pathologist:           Fabio Jacobs MD                                                           Specimens:   1) - Small Intestine, Duodenum, small bowel bx                                                      2) - Gastric, Antrum, antrum bx                "                                                      3) - Esophagus, Distal, distal esophagus bx                                               • Final Diagnosis 07/23/2019    Final                    Value:This result contains rich text formatting which cannot be displayed here.   • Gross Description 07/23/2019    Final                    Value:This result contains rich text formatting which cannot be displayed here.     Assessment/Plan      1. Nausea    2. Generalized abdominal pain    3. Gastroesophageal reflux disease without esophagitis    .       Orders placed during this encounter include:  Orders Placed This Encounter   Procedures   • NM Gastric Emptying     Standing Status:   Future     Standing Expiration Date:   8/29/2020     Order Specific Question:   Reason for Exam:     Answer:   nausea     Order Specific Question:   Patient Pregnant     Answer:   No     Order Specific Question:   Is the patient breastfeeding?     Answer:   No       * Surgery not found *    Review and/or summary of lab tests, radiology, procedures, medications. Review and summary of old records and obtaining of history. The risks and benefits of my recommendations, as well as other treatment options were discussed with the patient today. Questions were answered.    New Medications Ordered This Visit   Medications   • promethazine (PHENERGAN) 25 MG tablet     Sig: Take 1 tablet by mouth Daily As Needed for Nausea or Vomiting.     Dispense:  30 tablet     Refill:  5       Follow-up: Return in about 4 weeks (around 9/26/2019).          This document has been electronically signed by ANH Xiao on August 30, 2019 2:01 PM             Results for orders placed or performed during the hospital encounter of 07/23/19   Tissue Pathology Exam   Result Value Ref Range    Case Report       Surgical Pathology Report                         Case: [id number]                                  Authorizing Provider:  Guicho Quiñonze MD         Collected:           07/23/2019 02:33 PM          Ordering Location:     [facility]             Received:            07/24/2019 06:48 AM                                 [facility]                                                     Pathologist:           Fabio Jacobs MD                                                           Specimens:   1) - Small Intestine, Duodenum, small bowel bx                                                      2) - Gastric, Antrum, antrum bx                                                                     3) - Esophagus, Distal, distal esophagus bx                                                Final Diagnosis       1.  SMALL BOWEL, BIOPSY:  NO SIGNIFICANT HISTOLOGIC ABNORMALITY.    2.  GASTRIC ANTRUM, BIOPSY:  NO SIGNIFICANT HISTOLOGIC ABNORMALITY.    3.  DISTAL ESOPHAGUS, BIOPSY:  BENIGN SQUAMOUS MUCOSA.      Gross Description       1.  Two tan fragments measure 0.8 x 0.5 x 0.2 cm together.  Totally submitted.    2.  Two tan fragments measure 0.5 x 0.5 x 0.2 cm together.  Totally submitted.    3.  Two gray fragments measure 0.8 x 0.7 x 0.1 cm together.  Totally submitted.         Pregnancy, Urine -   Result Value Ref Range    HCG, Urine QL Negative Negative   Results for orders placed or performed in visit on 06/14/19   Chlamydia trachomatis, Neisseria gonorrhoeae, Trichomonas vaginalis, PCR - Urine, Urine, Clean Catch   Result Value Ref Range    Chlamydia DNA by PCR Negative Negative    Neisseria gonorrhoeae by PCR Negative Negative    Trichomonas vaginalis PCR Negative    Results for orders placed or performed in visit on 05/07/19   Urinalysis With Culture If Indicated - Urine, Clean Catch   Result Value Ref Range    Color, UA Yellow Yellow, Straw    Appearance, UA Clear Clear    pH, UA 5.5 5.5 - 8.0    Specific Gravity, UA >=1.030 1.005 - 1.030    Glucose, UA Negative Negative    Ketones, UA Trace (A) Negative    Bilirubin, UA Negative Negative    Blood, UA  Negative Negative    Protein, UA Negative Negative    Leuk Esterase, UA Negative Negative    Nitrite, UA Negative Negative    Urobilinogen, UA 1.0 E.U./dL 0.2 - 1.0 E.U./dL   CBC Auto Differential   Result Value Ref Range    WBC 6.71 3.40 - 10.80 10*3/mm3    RBC 4.99 3.77 - 5.28 10*6/mm3    Hemoglobin 14.5 12.0 - 15.9 g/dL    Hematocrit 44.6 34.0 - 46.6 %    MCV 89.4 79.0 - 97.0 fL    MCH 29.1 26.6 - 33.0 pg    MCHC 32.5 31.5 - 35.7 g/dL    RDW 14.4 12.3 - 15.4 %    RDW-SD 45.8 37.0 - 54.0 fl    MPV 10.4 6.0 - 12.0 fL    Platelets 261 140 - 450 10*3/mm3    Neutrophil % 41.6 (L) 42.7 - 76.0 %    Lymphocyte % 49.0 (H) 19.6 - 45.3 %    Monocyte % 7.0 5.0 - 12.0 %    Eosinophil % 2.1 0.3 - 6.2 %    Basophil % 0.3 0.0 - 1.5 %    Neutrophils, Absolute 2.79 1.70 - 7.00 10*3/mm3    Lymphocytes, Absolute 3.29 (H) 0.70 - 3.10 10*3/mm3    Monocytes, Absolute 0.47 0.10 - 0.90 10*3/mm3    Eosinophils, Absolute 0.14 0.00 - 0.40 10*3/mm3    Basophils, Absolute 0.02 0.00 - 0.20 10*3/mm3   Urine Drug Screen - Urine, Clean Catch   Result Value Ref Range    Amphet/Methamphet, Screen Negative Negative    Barbiturates Screen, Urine Negative Negative    Benzodiazepine Screen, Urine Negative Negative    Cocaine Screen, Urine Negative Negative    Opiate Screen Negative Negative    THC, Screen, Urine Negative Negative    Methadone Screen, Urine Negative Negative    Oxycodone Screen, Urine Negative Negative   Pregnancy, Urine - Urine, Clean Catch   Result Value Ref Range    HCG, Urine QL Negative Negative   Vitamin D 25 Hydroxy   Result Value Ref Range    25 Hydroxy, Vitamin D 47.7 30.0 - 100.0 ng/ml   TSH   Result Value Ref Range    TSH 1.220 0.270 - 4.200 mIU/mL   T4, Free   Result Value Ref Range    Free T4 1.53 0.93 - 1.70 ng/dL   Vitamin B12   Result Value Ref Range    Vitamin B-12 211 211 - 946 pg/mL   Comprehensive Metabolic Panel   Result Value Ref Range    Glucose 98 70 - 99 mg/dL    BUN 9 7 - 23 mg/dL    Creatinine 1.08 (H) 0.52 -  1.04 mg/dL    Sodium 140 137 - 145 mmol/L    Potassium 4.0 3.4 - 5.0 mmol/L    Chloride 104 101 - 112 mmol/L    CO2 27.0 22.0 - 30.0 mmol/L    Calcium 10.0 8.4 - 10.2 mg/dL    Total Protein 7.3 6.3 - 8.6 g/dL    Albumin 4.50 3.50 - 5.00 g/dL    ALT (SGPT) 14 <=35 U/L    AST (SGOT) 20 14 - 36 U/L    Alkaline Phosphatase 57 38 - 126 U/L    Total Bilirubin 0.7 0.2 - 1.3 mg/dL    eGFR Non  Amer 64 (L) 71 - 165 mL/min/1.73    Globulin 2.8 2.3 - 3.5 gm/dL    A/G Ratio 1.6 1.1 - 1.8 g/dL    BUN/Creatinine Ratio 8.3 7.0 - 25.0    Anion Gap 9.0 5.0 - 15.0 mmol/L   Results for orders placed or performed during the hospital encounter of 04/07/19   POC Urinalysis Dipstick, Multipro (Automated dipstick)   Result Value Ref Range    Color Dark Yellow Yellow, Straw, Dark Yellow, Yamile    Clarity, UA Cloudy (A) Clear    Glucose, UA Negative Negative, 1000 mg/dL (3+) mg/dL    Bilirubin Small (1+) (A) Negative    Ketones, UA Trace (A) Negative    Specific Gravity  1.030 1.005 - 1.030    Blood, UA Negative Negative    pH, Urine 5.5 5.0 - 8.0    Protein, POC 2+ (A) Negative mg/dL    Urobilinogen, UA Normal Normal    Nitrite, UA Negative Negative    Leukocytes Negative Negative   POCT Pregnancy, urine   Result Value Ref Range    HCG, Urine, QL Negative Negative    Lot Number [id number]     Internal Positive Control Positive     Internal Negative Control Negative    Results for orders placed or performed in visit on 10/29/18   Urinalysis, Microscopic Only - Urine, Clean Catch   Result Value Ref Range    RBC, UA Too Numerous to Count (A) None Seen /HPF    WBC, UA 3-5 (A) None Seen /HPF    Bacteria, UA 1+ (A) None Seen /HPF    Squamous Epithelial Cells, UA 7-12 (A) None Seen, 0-2 /HPF    Hyaline Casts, UA 0-2 None Seen /LPF    Methodology Manual Light Microscopy    Urinalysis With Culture If Indicated - Urine, Clean Catch   Result Value Ref Range    Color, UA Yamile (A) Yellow, Straw    Appearance, UA Cloudy (A) Clear    pH, UA <=5.0  (L) 5.5 - 8.0    Specific Gravity, UA >=1.030 1.005 - 1.030    Glucose, UA Negative Negative    Ketones, UA 15 mg/dL (1+) (A) Negative    Bilirubin, UA Large (3+) (A) Negative    Blood, UA Large (3+) (A) Negative    Protein,  mg/dL (2+) (A) Negative    Leuk Esterase, UA Negative Negative    Nitrite, UA Negative Negative    Urobilinogen, UA 1.0 E.U./dL 0.2 - 1.0 E.U./dL     *Note: Due to a large number of results and/or encounters for the requested time period, some results have not been displayed. A complete set of results can be found in Results Review.

## 2019-09-05 NOTE — TELEPHONE ENCOUNTER
Spoke to pharmacy regarding patient phnergan and protonix. Patient stated I needed to contact them regarding medications. Pharmacist stated she had gotten a three month supply and she is not due for refills for another three weeks.

## 2019-09-11 ENCOUNTER — APPOINTMENT (OUTPATIENT)
Dept: NUCLEAR MEDICINE | Facility: HOSPITAL | Age: 19
End: 2019-09-11

## 2019-09-20 ENCOUNTER — CLINICAL SUPPORT (OUTPATIENT)
Dept: FAMILY MEDICINE CLINIC | Facility: CLINIC | Age: 19
End: 2019-09-20

## 2019-09-20 DIAGNOSIS — E53.8 B12 DEFICIENCY: ICD-10-CM

## 2019-09-20 PROCEDURE — 96372 THER/PROPH/DIAG INJ SC/IM: CPT | Performed by: NURSE PRACTITIONER

## 2019-09-20 RX ADMIN — CYANOCOBALAMIN 1000 MCG: 1000 INJECTION, SOLUTION INTRAMUSCULAR; SUBCUTANEOUS at 11:19

## 2019-09-30 ENCOUNTER — CLINICAL SUPPORT (OUTPATIENT)
Dept: OBSTETRICS AND GYNECOLOGY | Facility: CLINIC | Age: 19
End: 2019-09-30

## 2019-09-30 ENCOUNTER — OFFICE VISIT (OUTPATIENT)
Dept: FAMILY MEDICINE CLINIC | Facility: CLINIC | Age: 19
End: 2019-09-30

## 2019-09-30 VITALS
WEIGHT: 111 LBS | HEIGHT: 66 IN | HEART RATE: 94 BPM | DIASTOLIC BLOOD PRESSURE: 62 MMHG | OXYGEN SATURATION: 98 % | SYSTOLIC BLOOD PRESSURE: 100 MMHG | BODY MASS INDEX: 17.84 KG/M2

## 2019-09-30 DIAGNOSIS — H69.80 DYSFUNCTION OF EUSTACHIAN TUBE, UNSPECIFIED LATERALITY: Primary | ICD-10-CM

## 2019-09-30 DIAGNOSIS — Z30.42 SURVEILLANCE FOR DEPO-PROVERA CONTRACEPTION: ICD-10-CM

## 2019-09-30 PROCEDURE — 96372 THER/PROPH/DIAG INJ SC/IM: CPT | Performed by: NURSE PRACTITIONER

## 2019-09-30 PROCEDURE — 99213 OFFICE O/P EST LOW 20 MIN: CPT | Performed by: NURSE PRACTITIONER

## 2019-09-30 RX ADMIN — MEDROXYPROGESTERONE ACETATE 150 MG: 150 INJECTION, SUSPENSION INTRAMUSCULAR at 16:07

## 2019-10-01 PROBLEM — H69.80 DYSFUNCTION OF EUSTACHIAN TUBE: Status: ACTIVE | Noted: 2019-10-01

## 2019-10-01 NOTE — PROGRESS NOTES
Subjective   Mandi Walters is a 19 y.o. female who presents to the office for ear pain follow-up.  [facility] for eustachian tube dysfunction on 9/26.  Was given Prednisone to be taken daily.  History of Present Illness     The following portions of the patient's history were reviewed and updated as appropriate: allergies, current medications, past family history, past medical history, past social history, past surgical history and problem list.    Review of Systems   Constitutional: Negative for chills, fatigue and fever.   HENT: Positive for ear pain. Negative for congestion, sneezing, sore throat and trouble swallowing.    Eyes: Negative for visual disturbance.   Respiratory: Negative for cough, chest tightness, shortness of breath and wheezing.    Cardiovascular: Negative for chest pain, palpitations and leg swelling.   Gastrointestinal: Negative for abdominal pain, constipation, diarrhea, nausea and vomiting.   Genitourinary: Negative for dysuria, frequency and urgency.   Musculoskeletal: Negative for neck pain.   Skin: Negative for rash.   Neurological: Negative for dizziness, weakness and headaches.   Psychiatric/Behavioral:        In the past two weeks the pt has not felt down, depressed, hopeless or lost interest in doing things   All other systems reviewed and are negative.      Objective   Physical Exam   Constitutional: She is oriented to person, place, and time. She appears well-developed and well-nourished. She is cooperative.  Non-toxic appearance. No distress.   HENT:   Head: Normocephalic and atraumatic.   Right Ear: External ear normal. A middle ear effusion is present.   Left Ear: External ear normal. A middle ear effusion is present.   Nose: Nose normal.   Mouth/Throat: Oropharynx is clear and moist.   Eyes: Conjunctivae and EOM are normal. Pupils are equal, round, and reactive to light. Right eye exhibits no discharge. Left eye exhibits no discharge. No scleral  icterus.   Neck: Normal range of motion. Neck supple. No thyromegaly present.   Cardiovascular: Normal rate, regular rhythm, normal heart sounds and intact distal pulses. Exam reveals no gallop and no friction rub.   No murmur heard.  Pulmonary/Chest: Effort normal and breath sounds normal. No respiratory distress. She has no wheezes. She has no rales.   Abdominal: Soft. Bowel sounds are normal. She exhibits no distension. There is no tenderness. There is no rebound and no guarding.   Musculoskeletal: Normal range of motion. She exhibits no edema.   Lymphadenopathy:     She has no cervical adenopathy.   Neurological: She is alert and oriented to person, place, and time. No cranial nerve deficit. GCS eye subscore is 4. GCS verbal subscore is 5. GCS motor subscore is 6.   Skin: Skin is warm and dry. No rash noted.   Psychiatric: She has a normal mood and affect. Her behavior is normal. Judgment and thought content normal.   Nursing note and vitals reviewed.      Assessment/Plan   Mandi was seen today for earache.    Diagnoses and all orders for this visit:    Dysfunction of Eustachian tube, unspecified laterality  -     Ambulatory Referral to ENT (Otolaryngology)    Encouraged to continue with Prednisone.

## 2019-10-04 ENCOUNTER — OFFICE VISIT (OUTPATIENT)
Dept: OTOLARYNGOLOGY | Facility: CLINIC | Age: 19
End: 2019-10-04

## 2019-10-04 VITALS — BODY MASS INDEX: 18.33 KG/M2 | WEIGHT: 110 LBS | OXYGEN SATURATION: 99 % | HEIGHT: 65 IN

## 2019-10-04 DIAGNOSIS — H92.03 OTALGIA OF BOTH EARS: Primary | ICD-10-CM

## 2019-10-04 PROCEDURE — 99203 OFFICE O/P NEW LOW 30 MIN: CPT | Performed by: OTOLARYNGOLOGY

## 2019-10-04 RX ORDER — PREDNISONE 20 MG/1
TABLET ORAL
Qty: 12 TABLET | Refills: 0 | OUTPATIENT
Start: 2019-10-04 | End: 2020-03-02

## 2019-10-04 RX ORDER — MEDROXYPROGESTERONE ACETATE 150 MG/ML
INJECTION, SUSPENSION INTRAMUSCULAR
Refills: 3 | COMMUNITY
Start: 2019-09-30 | End: 2020-05-22

## 2019-10-04 NOTE — PROGRESS NOTES
"Subjective   Mandi Walters is a 19 y.o. female.     History of Present Illness   Patient is here for evaluation of ear pain.  States that she is been told on multiple occasions that she has \"fluid behind her ears\".  When I first asked her what symptoms she was having she said \"all of them\".  Asked to be more specific and she said the primary symptom she was having was pain.  She says this is present on a daily basis and has been for months.  Interestingly she is very clear that her hearing is completely normal, in her words \"perfectly fine\".  Did have tubes as a child.  No otorrhea.  Nothing in particular brought this on.  Apparently was given a low dose of prednisone last month.  Says the pain will radiate down into her neck and will sometimes give her a headache.    The following portions of the patient's history were reviewed and updated as appropriate: allergies, current medications, past family history, past medical history, past social history, past surgical history and problem list.      Mandi Walters reports that she has been smoking.  She has a 7.00 pack-year smoking history. She has never used smokeless tobacco. She reports that she does not drink alcohol or use drugs.  Patient is a tobacco user and has been counseled for use of tobacco products    Family History   Problem Relation Age of Onset   • Osteoarthritis Mother    • Diabetes Maternal Grandmother    • Heart disease Maternal Grandmother    • Osteoarthritis Maternal Grandmother    • Coronary artery disease Paternal Grandfather        Allergies   Allergen Reactions   • Amoxicillin      Anaphylaxis   • Codeine      Rash   • Loratadine      Unknown   • Penicillins      Anaphylaxis         Current Outpatient Medications:   •  cyclobenzaprine (FLEXERIL) 5 MG tablet, Take 1 tablet by mouth 3 (Three) Times a Day As Needed for Muscle Spasms., Disp: 221 tablet, Rfl: 0  •  pantoprazole (PROTONIX) 40 MG EC tablet, Take 1 tablet by mouth Daily., " Disp: 30 tablet, Rfl: 1  •  promethazine (PHENERGAN) 25 MG tablet, Take 1 tablet by mouth Daily As Needed for Nausea or Vomiting., Disp: 30 tablet, Rfl: 5  •  pseudoephedrine (SUDAFED) 60 MG tablet, Take 1 tablet by mouth Every 6 (Six) Hours As Needed for Congestion., Disp: 30 tablet, Rfl: 0  •  guaiFENesin (MUCINEX) 600 MG 12 hr tablet, Take 1 tablet by mouth 2 (Two) Times a Day., Disp: 14 tablet, Rfl: 0  •  medroxyPROGESTERone (DEPO-PROVERA) 150 MG/ML injection, , Disp: , Rfl: 3    Current Facility-Administered Medications:   •  cyanocobalamin injection 1,000 mcg, 1,000 mcg, Intramuscular, Q28 Days, Idalmis Peter APRN, 1,000 mcg at 09/20/19 1119  •  MedroxyPROGESTERone Acetate (DEPO-PROVERA) injection 150 mg, 150 mg, Intramuscular, Q3 Months, Tena Porras APRN, 150 mg at 09/30/19 1607    Past Medical History:   Diagnosis Date   • Bacterial vaginosis    • Costal chondritis    • Fatigue    • GERD (gastroesophageal reflux disease)    • Hypoglycemia    • Malaise and fatigue    • Menorrhagia    • Well child visit        Past Surgical History:   Procedure Laterality Date   • ENDOSCOPY N/A 7/23/2019    Procedure: ESOPHAGOGASTRODUODENOSCOPY possible dilation;  Surgeon: Guicho Quiñonez MD;  Location: [facility] ENDOSCOPY;  Service: Gastroenterology   • TYMPANOSTOMY TUBE PLACEMENT      Tympanostomy tube-2 (1)          Review of Systems   Constitutional: Negative for fever.   HENT: Positive for ear pain. Negative for hearing loss.    All other systems reviewed and are negative.          Objective   Physical Exam  General: Well-developed well-nourished female in no acute distress.  Alert and oriented x-3. Head: Normocephalic. Face: Symmetrical strength and appearance. PERRL. EOMI. Voice:Strong. Speech:Fluent  Ears: External ears no deformity, canals no discharge, tympanic membranes intact with tympanosclerosis but no evidence of infection or effusion bilaterally.  Nose: Nares show no discharge mass polyp or  purulence.  Boggy mucosa is present.  No gross external deformity.  Septum: Midline  Oral cavity: Lips and gums without lesions.  Tongue and floor of mouth without lesions.  Parotid and submandibular ducts unobstructed.  No mucosal lesions on the buccal mucosa or vestibule of the mouth.  Pharynx: No erythema exudate mass or ulcer  Neck: No lymphadenopathy.  No thyromegaly.  Trachea and larynx midline.  No masses in the parotid or submandibular glands.  TMJs are tender to palpation bilaterally.        Assessment/Plan   Mandi was seen today for ear problem.    Diagnoses and all orders for this visit:    Otalgia of both ears      PLan: Reassured the patient that she did not have any evidence of fluid behind her ears.  I suspect what other providers are seeing is the tympanosclerosis.  I told her if she had fluid behind her ears she would have decreased hearing.  I explained that her ear pain is due to inflammation of the temporomandibular joint.  I am going to give her a 6-day course of prednisone starting at 60 mg taper down to nothing to see if this will give her some relief but also encouraged her to utilize a soft diet and to see her dentist as soon as possible.  At her age, she states she is never had her wisdom teeth removed and this might be an issue.  I will see her again as needed.

## 2019-10-08 ENCOUNTER — OFFICE VISIT (OUTPATIENT)
Dept: OBSTETRICS AND GYNECOLOGY | Facility: CLINIC | Age: 19
End: 2019-10-08

## 2019-10-08 VITALS
HEART RATE: 69 BPM | HEIGHT: 65 IN | SYSTOLIC BLOOD PRESSURE: 106 MMHG | BODY MASS INDEX: 18.63 KG/M2 | DIASTOLIC BLOOD PRESSURE: 60 MMHG | WEIGHT: 111.8 LBS

## 2019-10-08 DIAGNOSIS — N92.1 BREAKTHROUGH BLEEDING ON DEPO PROVERA: ICD-10-CM

## 2019-10-08 DIAGNOSIS — Z01.419 ENCOUNTER FOR GYNECOLOGICAL EXAMINATION WITH PAPANICOLAOU SMEAR OF CERVIX: Primary | ICD-10-CM

## 2019-10-08 DIAGNOSIS — N64.4 BREAST TENDERNESS: ICD-10-CM

## 2019-10-08 PROCEDURE — 99395 PREV VISIT EST AGE 18-39: CPT | Performed by: NURSE PRACTITIONER

## 2019-10-08 PROCEDURE — G0123 SCREEN CERV/VAG THIN LAYER: HCPCS | Performed by: NURSE PRACTITIONER

## 2019-10-08 PROCEDURE — 88141 CYTOPATH C/V INTERPRET: CPT | Performed by: PATHOLOGY

## 2019-10-08 NOTE — PROGRESS NOTES
Subjective   Chief Complaint   Patient presents with   • Gynecologic Exam     well woman annual visit     Mandi Walters is a 19 y.o. year old  presenting to be seen for her annual exam.  Today she has concerns about BTB on Depo Provera. She had her last injection on . Pt continues to have some light spotting, brown in color. She complained of this throughout her last injection as well. She states she hasn't taken OCPs in years but couldn't because she felt dizzy. She is willing to try a small dose of estrogen to see if this stops her bleeding.     Patient's last menstrual period was 09/15/2019.    OB History      Para Term  AB Living    1       1      SAB TAB Ectopic Molar Multiple Live Births    1                    Current birth control method: Depo-Provera injections.    She is sexually active.  In the past 12 months there has not been new sexual partners.  Condoms are not typically used.  She would not like to be screened for STD's at today's exam. Negative G/C/T 2019    Past 6 month menstrual history:    Cycle Frequency: irregular  infrequent   Menstrual cycle character: flow has been absent and flow is typically light   Cycle Duration: 0 - 2   Number of heavy days of flows: 0   Dysmenorrhea: is not affecting her activities of daily living   PMS: is not affecting her activities of daily living   Intermenstrual bleeding present: yes   Post-coital bleeding present: no     She exercises regularly: yes.  She wears her seat belt:yes.  She has concerns about domestic violence: no.  Last colonoscopy: Never  Last DEXA: Never  Last MMG: Never  Last pap: Never  History of abnormal PAP: N/A    The following portions of the patient's history were reviewed and updated as appropriate:problem list, current medications, allergies, past family history, past medical history, past social history and past surgical history.    Social History    Tobacco Use      Smoking status: Current Some Day  "Smoker        Packs/day: 0.50        Years: 7.00        Pack years: 3.5      Smokeless tobacco: Never Used   States she is not ready to quit but has cut back to 1/2ppd  Social History     Substance and Sexual Activity   Alcohol Use No      Review of Systems   Constitutional: Negative.  Negative for chills and fever.   Respiratory: Negative.    Cardiovascular: Negative.    Gastrointestinal: Negative.  Negative for constipation, diarrhea, nausea and vomiting. Abdominal pain: generalize.        GERD   Endocrine: Negative.    Genitourinary: Positive for vaginal bleeding (BTB). Negative for difficulty urinating, dysuria, menstrual problem, pelvic pain, urgency, vaginal discharge and vaginal pain.        Breast tenderness   Musculoskeletal:        Pulled muscle along her right side   Skin: Negative.    Neurological: Negative for dizziness, syncope, light-headedness and headaches.   Psychiatric/Behavioral: Negative for suicidal ideas. The patient is not nervous/anxious.          Objective   /60   Pulse 69   Ht 165.1 cm (65\")   Wt 50.7 kg (111 lb 12.8 oz)   LMP 09/15/2019 Comment: spotting hasn't stopped  Breastfeeding? No   BMI 18.60 kg/m²     General:  well developed; well nourished  no acute distress   Skin:  No suspicious lesions seen   Thyroid: not examined   Breasts:  Examined in supine position  Symmetric without masses or skin dimpling  Nipples normal without inversion, lesions or discharge  There are no palpable axillary nodes  Fibrocystic changes are present both breasts without a discrete mass  generalized tenderness.    Abdomen: no umbilical or inguinal hernias are present  no hepato-splenomegaly  Normal findings: bowel sounds normal  Abnormal findings: mild tenderness generalized   Cardiac: Heart sounds are normal.  Regular rate and rhythm without murmur, gallop or rub.   Resp: Normal expansion.  Clear to auscultation.  No rales, rhonchi, or wheezing.   Psych: alert,oriented, in NAD with a full " range of affect, normal behavior and no psychotic features   Pelvis: Uterus:  Clinical staff was present for exam  External genitalia:  normal appearance of the external genitalia including Bartholin's and Oconto's glands.  :  urethral meatus normal;  Vaginal:  normal pink mucosa without prolapse or lesions and blood present -  small amount and dark red  Cervix:  normal appearance.  Uterus:  normal size, shape and consistency  Adnexa:  normal bimanual exam of the adnexa.     Lab Review   No data reviewed and STD swabs for GC, chlamydia and trichimoniasis    Imaging  No data reviewed       Mandi was seen today for gynecologic exam.    Diagnoses and all orders for this visit:    Encounter for gynecological examination with Papanicolaou smear of cervix  -     Liquid-based Pap Smear, Screening    Breakthrough bleeding on depo provera    Breast tenderness    Pap results: I will send card in mail or call if abnormal. RTC annually for well woman exams.     Discussed BTB. Pt is willing to trial estrogen to see if it will stop. Premarin 0.3mg x 10 tablets given to the pt. Pt is to take 1 PO daily x 5 days then monitor for more bleeding. If not improving, continue for 5 more days. Pt warned to stop immediately should she have dizziness, migraines, or chest pains. If continuing, pt may consider another form of contraception.     Next injection due between 12/16-12/30    Discussed the need to reduce caffeine. Pt states she drinks Mt. Dew often. She also smokes. I explained fibrocystic breast tissue. Encouraged her to perform SBE and take Vit E OTC.     Follow up with PCP for concerns of a pulled muscle.   This note was electronically signed.    Tena Porras, APRN  October 8, 2019

## 2019-10-15 LAB
GEN CATEG CVX/VAG CYTO-IMP: NORMAL
LAB AP CASE REPORT: NORMAL
LAB AP GYN ADDITIONAL INFORMATION: NORMAL
LAB AP GYN OTHER FINDINGS: NORMAL
PATH INTERP SPEC-IMP: NORMAL
STAT OF ADQ CVX/VAG CYTO-IMP: NORMAL

## 2019-10-21 ENCOUNTER — CLINICAL SUPPORT (OUTPATIENT)
Dept: FAMILY MEDICINE CLINIC | Facility: CLINIC | Age: 19
End: 2019-10-21

## 2019-10-21 DIAGNOSIS — E53.8 B12 DEFICIENCY: ICD-10-CM

## 2019-10-21 PROCEDURE — 96372 THER/PROPH/DIAG INJ SC/IM: CPT | Performed by: NURSE PRACTITIONER

## 2019-10-21 RX ADMIN — CYANOCOBALAMIN 1000 MCG: 1000 INJECTION, SOLUTION INTRAMUSCULAR; SUBCUTANEOUS at 13:20

## 2019-10-22 RX ORDER — PANTOPRAZOLE SODIUM 40 MG/1
40 TABLET, DELAYED RELEASE ORAL DAILY
Qty: 30 TABLET | Refills: 5 | Status: SHIPPED | OUTPATIENT
Start: 2019-10-22 | End: 2020-04-21 | Stop reason: SDUPTHER

## 2019-11-16 DIAGNOSIS — K21.9 GASTROESOPHAGEAL REFLUX DISEASE WITHOUT ESOPHAGITIS: ICD-10-CM

## 2019-11-18 RX ORDER — RANITIDINE 150 MG/1
TABLET ORAL
Qty: 30 TABLET | Refills: 5 | OUTPATIENT
Start: 2019-11-18

## 2019-11-22 ENCOUNTER — CLINICAL SUPPORT (OUTPATIENT)
Dept: FAMILY MEDICINE CLINIC | Facility: CLINIC | Age: 19
End: 2019-11-22

## 2019-11-22 DIAGNOSIS — E53.8 B12 DEFICIENCY: ICD-10-CM

## 2019-11-22 PROCEDURE — 96372 THER/PROPH/DIAG INJ SC/IM: CPT | Performed by: INTERNAL MEDICINE

## 2019-11-22 RX ADMIN — CYANOCOBALAMIN 1000 MCG: 1000 INJECTION, SOLUTION INTRAMUSCULAR; SUBCUTANEOUS at 11:51

## 2019-11-26 DIAGNOSIS — K21.9 GASTROESOPHAGEAL REFLUX DISEASE WITHOUT ESOPHAGITIS: ICD-10-CM

## 2019-11-26 RX ORDER — RANITIDINE 150 MG/1
TABLET ORAL
Qty: 30 TABLET | Refills: 5 | OUTPATIENT
Start: 2019-11-26

## 2019-12-13 ENCOUNTER — CLINICAL SUPPORT (OUTPATIENT)
Dept: OBSTETRICS AND GYNECOLOGY | Facility: CLINIC | Age: 19
End: 2019-12-13

## 2019-12-13 VITALS — BODY MASS INDEX: 20.07 KG/M2 | WEIGHT: 120.6 LBS

## 2019-12-13 DIAGNOSIS — Z30.42 SURVEILLANCE FOR DEPO-PROVERA CONTRACEPTION: ICD-10-CM

## 2019-12-13 PROCEDURE — 96372 THER/PROPH/DIAG INJ SC/IM: CPT | Performed by: NURSE PRACTITIONER

## 2019-12-13 RX ADMIN — MEDROXYPROGESTERONE ACETATE 150 MG: 150 INJECTION, SUSPENSION INTRAMUSCULAR at 12:01

## 2020-01-21 ENCOUNTER — CLINICAL SUPPORT (OUTPATIENT)
Dept: FAMILY MEDICINE CLINIC | Facility: CLINIC | Age: 20
End: 2020-01-21

## 2020-01-21 DIAGNOSIS — E55.9 VITAMIN D DEFICIENCY: ICD-10-CM

## 2020-01-21 PROCEDURE — 96372 THER/PROPH/DIAG INJ SC/IM: CPT | Performed by: NURSE PRACTITIONER

## 2020-01-21 RX ADMIN — CYANOCOBALAMIN 1000 MCG: 1000 INJECTION, SOLUTION INTRAMUSCULAR; SUBCUTANEOUS at 11:46

## 2020-02-24 ENCOUNTER — CLINICAL SUPPORT (OUTPATIENT)
Dept: FAMILY MEDICINE CLINIC | Facility: CLINIC | Age: 20
End: 2020-02-24

## 2020-02-24 DIAGNOSIS — E53.8 B12 DEFICIENCY: ICD-10-CM

## 2020-02-24 PROCEDURE — 96372 THER/PROPH/DIAG INJ SC/IM: CPT | Performed by: NURSE PRACTITIONER

## 2020-02-24 RX ADMIN — CYANOCOBALAMIN 1000 MCG: 1000 INJECTION, SOLUTION INTRAMUSCULAR; SUBCUTANEOUS at 11:51

## 2020-03-04 ENCOUNTER — CLINICAL SUPPORT (OUTPATIENT)
Dept: OBSTETRICS AND GYNECOLOGY | Facility: CLINIC | Age: 20
End: 2020-03-04

## 2020-03-04 VITALS — WEIGHT: 118.8 LBS | BODY MASS INDEX: 19.77 KG/M2

## 2020-03-04 DIAGNOSIS — E55.9 VITAMIN D DEFICIENCY: Primary | ICD-10-CM

## 2020-03-04 DIAGNOSIS — R63.6 UNDERWEIGHT: ICD-10-CM

## 2020-03-04 DIAGNOSIS — Z30.42 SURVEILLANCE FOR DEPO-PROVERA CONTRACEPTION: ICD-10-CM

## 2020-03-04 DIAGNOSIS — R10.84 GENERALIZED ABDOMINAL PAIN: ICD-10-CM

## 2020-03-04 PROCEDURE — 96372 THER/PROPH/DIAG INJ SC/IM: CPT | Performed by: NURSE PRACTITIONER

## 2020-03-04 RX ADMIN — MEDROXYPROGESTERONE ACETATE 150 MG: 150 INJECTION, SUSPENSION INTRAMUSCULAR at 11:20

## 2020-03-26 DIAGNOSIS — R11.0 NAUSEA: ICD-10-CM

## 2020-03-26 RX ORDER — ONDANSETRON 4 MG/1
4 TABLET, ORALLY DISINTEGRATING ORAL EVERY 8 HOURS PRN
Qty: 9 TABLET | Refills: 0 | Status: SHIPPED | OUTPATIENT
Start: 2020-03-26 | End: 2020-04-25

## 2020-04-21 ENCOUNTER — OFFICE VISIT (OUTPATIENT)
Dept: GASTROENTEROLOGY | Facility: CLINIC | Age: 20
End: 2020-04-21

## 2020-04-21 VITALS — HEIGHT: 65 IN | WEIGHT: 123 LBS | BODY MASS INDEX: 20.49 KG/M2

## 2020-04-21 DIAGNOSIS — K29.50 OTHER CHRONIC GASTRITIS WITHOUT HEMORRHAGE: ICD-10-CM

## 2020-04-21 DIAGNOSIS — R11.0 NAUSEA: ICD-10-CM

## 2020-04-21 DIAGNOSIS — K21.00 GASTROESOPHAGEAL REFLUX DISEASE WITH ESOPHAGITIS: Primary | ICD-10-CM

## 2020-04-21 DIAGNOSIS — K59.04 CHRONIC IDIOPATHIC CONSTIPATION: ICD-10-CM

## 2020-04-21 PROCEDURE — 99442 PR PHYS/QHP TELEPHONE EVALUATION 11-20 MIN: CPT | Performed by: NURSE PRACTITIONER

## 2020-04-21 RX ORDER — DOCUSATE SODIUM 100 MG/1
100 CAPSULE, LIQUID FILLED ORAL 2 TIMES DAILY PRN
Qty: 60 CAPSULE | Refills: 5 | Status: SHIPPED | OUTPATIENT
Start: 2020-04-21 | End: 2020-11-09

## 2020-04-21 RX ORDER — PROMETHAZINE HYDROCHLORIDE 25 MG/1
25 TABLET ORAL EVERY 6 HOURS PRN
Qty: 30 TABLET | Refills: 2 | Status: SHIPPED | OUTPATIENT
Start: 2020-04-21 | End: 2020-10-21 | Stop reason: SDUPTHER

## 2020-04-21 RX ORDER — FAMOTIDINE 40 MG/1
40 TABLET, FILM COATED ORAL NIGHTLY PRN
Qty: 30 TABLET | Refills: 5 | Status: SHIPPED | OUTPATIENT
Start: 2020-04-21 | End: 2020-10-21

## 2020-04-21 RX ORDER — PANTOPRAZOLE SODIUM 40 MG/1
40 TABLET, DELAYED RELEASE ORAL DAILY
Qty: 30 TABLET | Refills: 5 | Status: SHIPPED | OUTPATIENT
Start: 2020-04-21 | End: 2020-10-21 | Stop reason: SDUPTHER

## 2020-04-21 NOTE — PATIENT INSTRUCTIONS

## 2020-04-21 NOTE — PROGRESS NOTES
Chief Complaint   Patient presents with   • Med Refill       Subjective    Mandi Walters is a 20 y.o. female. she is here via telephone today for follow-up.    History of Present Illness  20-year-old female presents for follow-up via telephone encounter.  States she only has rare symptoms of abdominal pain nausea and reflux.  Would also like to discuss constipation states she will go several days between bowel movements and then might have a day of diarrhea.  Denies any melena or hematochezia states she does not take Phenergan on a daily basis though has requested to increase the number of pills to 50 from 30.  She underwent EGD in July 2019 which noted mildly severe esophagitis, gastritis normal duodenum.  Reports reflux during the day has been well controlled with Protonix she was previously on Zantac at bedtime but that was discontinued due to recall and states she still has nocturnal symptoms of reflux.  She does report she drinks too much soda but tries to drink a lot of water.  Reports a good appetite and states her weight is stable on her home scale.       The following portions of the patient's history were reviewed and updated as appropriate:   Past Medical History:   Diagnosis Date   • Bacterial vaginosis    • Costal chondritis    • Fatigue    • GERD (gastroesophageal reflux disease)    • Hypoglycemia    • Malaise and fatigue    • Menorrhagia    • Well child visit      Past Surgical History:   Procedure Laterality Date   • ENDOSCOPY N/A 7/23/2019    Procedure: ESOPHAGOGASTRODUODENOSCOPY possible dilation;  Surgeon: Guicho Quiñonez MD;  Location: [facility] ENDOSCOPY;  Service: Gastroenterology   • TYMPANOSTOMY TUBE PLACEMENT      Tympanostomy tube-2 (1)      Family History   Problem Relation Age of Onset   • Osteoarthritis Mother    • Diabetes Maternal Grandmother    • Heart disease Maternal Grandmother    • Osteoarthritis Maternal Grandmother    • Coronary artery disease Paternal Grandfather      OB  History        1    Para        Term                AB   1    Living           SAB   1    TAB        Ectopic        Molar        Multiple        Live Births                  Prior to Admission medications    Medication Sig Start Date End Date Taking? Authorizing Provider   medroxyPROGESTERone (DEPO-PROVERA) 150 MG/ML injection  19  Yes Provider, MD Fam   pantoprazole (PROTONIX) 40 MG EC tablet Take 1 tablet by mouth Daily. 10/22/19  Yes Sherrell Bull APRN   ondansetron ODT (ZOFRAN-ODT) 4 MG disintegrating tablet Place 1 tablet under the tongue Every 8 (Eight) Hours As Needed for Nausea for up to 30 days. 3/26/20 4/25/20  Sherrell Bull APRN   azithromycin (ZITHROMAX) 250 MG tablet Take 2 tablets the first day, then 1 tablet daily for 4 days. 3/2/20 4/21/20  Izzy Luevano APRN   fluticasone (FLONASE) 50 MCG/ACT nasal spray 2 sprays into the nostril(s) as directed by provider Daily. 3/2/20 4/21/20  Izzy Luevano APRN     Allergies   Allergen Reactions   • Amoxicillin Anaphylaxis     Anaphylaxis   • Loratadine Unknown - High Severity     Unknown   • Penicillins Anaphylaxis   • Codeine Rash     Social History     Socioeconomic History   • Marital status: Single     Spouse name: Not on file   • Number of children: Not on file   • Years of education: Not on file   • Highest education level: Not on file   Tobacco Use   • Smoking status: Current Some Day Smoker     Packs/day: 0.50     Years: 7.00     Pack years: 3.50   • Smokeless tobacco: Never Used   Substance and Sexual Activity   • Alcohol use: No   • Drug use: No   • Sexual activity: Yes     Partners: Male     Birth control/protection: Injection       Review of Systems  Review of Systems   Constitutional: Positive for fatigue. Negative for activity change, appetite change, chills, diaphoresis, fever and unexpected weight change.   HENT: Negative for sore throat and trouble swallowing.    Respiratory: Negative for shortness of  "breath.    Gastrointestinal: Positive for abdominal pain (yesterday really bad ), constipation, diarrhea (a few days ago. ) and nausea (\"every now and then\" ). Negative for abdominal distention, anal bleeding, blood in stool, rectal pain and vomiting.   Musculoskeletal: Negative for arthralgias.   Skin: Negative for pallor.   Neurological: Negative for light-headedness.        Ht 165.1 cm (65\")   Wt 55.8 kg (123 lb)   BMI 20.47 kg/m²     Objective    Physical Exam   Psychiatric: She has a normal mood and affect. Her speech is normal.     Office Visit on 10/08/2019   Component Date Value Ref Range Status   • Case Report 10/08/2019    Final                    Value:Gynecologic Cytology Report                       Case: [id number]                                  Authorizing Provider:  Tena Porras    Collected:           10/08/2019 02:00 PM                                 ANH Wynne                                                                  Ordering Location:     [facility]     Received:            10/08/2019 04:01 PM                                 [facility]                                                               First Screen:          Matilda Renee                                                              Pathologist:           Fabio Jacobs MD                                                           Specimen:    Liquid-Based Pap, Screening, Cervix                                                       • Interpretation 10/08/2019 Negative for intraepithelial lesion or malignancy    Final   • General Categorization 10/08/2019 Within normal limits   Final   • Other Findings 10/08/2019 Reactive cellular changes   Final   • Specimen Adequacy 10/08/2019 Satisfactory for evaluation, endocervical/transformation zone component present   Final   • Additional Information 10/08/2019    Final                    Value:This result contains rich text formatting which cannot be " displayed here.     Assessment/Plan      1. Gastroesophageal reflux disease with esophagitis    2. Other chronic gastritis without hemorrhage    3. Chronic idiopathic constipation    4. Nausea    .     Continue Protonix daily will add Pepcid at bedtime as needed for nocturnal breakthrough symptoms.  Stool softener daily recommend increase water consumption high-fiber diet.  Continue antiemetic promethazine on as-needed basis.  Discussed with patient she should not be taking on a daily basis and will not increase number of pills provided.  Recommend she taper off usage then discontinue to only as needed.  Recommend when possible for her to follow-up with gastric empty study.    Orders placed during this encounter include:  No orders of the defined types were placed in this encounter.    This visit has been rescheduled as a phone visit to comply with patient safety concerns in accordance with CDC recommendations. Total time of discussion was 13 minutes.        * Surgery not found *    Review and/or summary of lab tests, radiology, procedures, medications. Review and summary of old records and obtaining of history. The risks and benefits of my recommendations, as well as other treatment options were discussed with the patient today. Questions were answered.    New Medications Ordered This Visit   Medications   • pantoprazole (PROTONIX) 40 MG EC tablet     Sig: Take 1 tablet by mouth Daily.     Dispense:  30 tablet     Refill:  5   • promethazine (PHENERGAN) 25 MG tablet     Sig: Take 1 tablet by mouth Every 6 (Six) Hours As Needed for Nausea or Vomiting.     Dispense:  30 tablet     Refill:  2   • docusate sodium (Colace) 100 MG capsule     Sig: Take 1 capsule by mouth 2 (Two) Times a Day As Needed for Constipation.     Dispense:  60 capsule     Refill:  5   • famotidine (Pepcid) 40 MG tablet     Sig: Take 1 tablet by mouth At Night As Needed for Heartburn.     Dispense:  30 tablet     Refill:  5       Follow-up:  Return in about 6 months (around 10/21/2020) for Recheck.          This document has been electronically signed by ANH Xiao on April 21, 2020 12:30             Results for orders placed or performed in visit on 10/08/19   Liquid-based Pap Smear, Screening   Result Value Ref Range    Case Report       Gynecologic Cytology Report                       Case: [id number]                                  Authorizing Provider:  Tena Porras    Collected:           10/08/2019 02:00 PM                                 ANH Wynne                                                                  Ordering Location:     [facility]     Received:            10/08/2019 04:01 PM                                 [facility]                                                               First Screen:          Matilda Renee                                                              Pathologist:           Fabio Jacobs MD                                                           Specimen:    Liquid-Based Pap, Screening, Cervix                                                        Interpretation Negative for intraepithelial lesion or malignancy      General Categorization Within normal limits     Other Findings Reactive cellular changes     Specimen Adequacy       Satisfactory for evaluation, endocervical/transformation zone component present    Additional Information       Disclaimer: Cervical cytology is a screening test primarily for squamous cancer and its precursors and has associated false-negative and false-positive results.  Technologies such as liquid-based preparations may decrease but will not eliminate all false-negative results.  Follow-up of unexplained clinical signs and symptoms is recommended to minimize false-negative results. (The [place] System for Reporting Cervical Cytology: Beth, 2015).     Results for orders placed or performed during the hospital encounter of 07/23/19     Tissue Pathology Exam   Result Value Ref Range    Case Report       Surgical Pathology Report                         Case: [id number]                                  Authorizing Provider:  Guicho Quiñonez MD        Collected:           07/23/2019 02:33 PM          Ordering Location:     [facility]             Received:            07/24/2019 06:48 AM                                 [place]                                                     Pathologist:           Fabio Jacobs MD                                                           Specimens:   1) - Small Intestine, Duodenum, small bowel bx                                                      2) - Gastric, Antrum, antrum bx                                                                     3) - Esophagus, Distal, distal esophagus bx                                                Final Diagnosis       1.  SMALL BOWEL, BIOPSY:  NO SIGNIFICANT HISTOLOGIC ABNORMALITY.    2.  GASTRIC ANTRUM, BIOPSY:  NO SIGNIFICANT HISTOLOGIC ABNORMALITY.    3.  DISTAL ESOPHAGUS, BIOPSY:  BENIGN SQUAMOUS MUCOSA.      Gross Description       1.  Two tan fragments measure 0.8 x 0.5 x 0.2 cm together.  Totally submitted.    2.  Two tan fragments measure 0.5 x 0.5 x 0.2 cm together.  Totally submitted.    3.  Two gray fragments measure 0.8 x 0.7 x 0.1 cm together.  Totally submitted.         Pregnancy, Urine -   Result Value Ref Range    HCG, Urine QL Negative Negative   Results for orders placed or performed in visit on 06/14/19   Chlamydia trachomatis, Neisseria gonorrhoeae, Trichomonas vaginalis, PCR - Urine, Urine, Clean Catch   Result Value Ref Range    Chlamydia DNA by PCR Negative Negative    Neisseria gonorrhoeae by PCR Negative Negative    Trichomonas vaginalis PCR Negative    Results for orders placed or performed in visit on 05/07/19   Urinalysis With Culture If Indicated - Urine, Clean Catch   Result Value Ref Range    Color, UA Yellow Yellow,  Straw    Appearance, UA Clear Clear    pH, UA 5.5 5.5 - 8.0    Specific Gravity, UA >=1.030 1.005 - 1.030    Glucose, UA Negative Negative    Ketones, UA Trace (A) Negative    Bilirubin, UA Negative Negative    Blood, UA Negative Negative    Protein, UA Negative Negative    Leuk Esterase, UA Negative Negative    Nitrite, UA Negative Negative    Urobilinogen, UA 1.0 E.U./dL 0.2 - 1.0 E.U./dL   CBC Auto Differential   Result Value Ref Range    WBC 6.71 3.40 - 10.80 10*3/mm3    RBC 4.99 3.77 - 5.28 10*6/mm3    Hemoglobin 14.5 12.0 - 15.9 g/dL    Hematocrit 44.6 34.0 - 46.6 %    MCV 89.4 79.0 - 97.0 fL    MCH 29.1 26.6 - 33.0 pg    MCHC 32.5 31.5 - 35.7 g/dL    RDW 14.4 12.3 - 15.4 %    RDW-SD 45.8 37.0 - 54.0 fl    MPV 10.4 6.0 - 12.0 fL    Platelets 261 140 - 450 10*3/mm3    Neutrophil % 41.6 (L) 42.7 - 76.0 %    Lymphocyte % 49.0 (H) 19.6 - 45.3 %    Monocyte % 7.0 5.0 - 12.0 %    Eosinophil % 2.1 0.3 - 6.2 %    Basophil % 0.3 0.0 - 1.5 %    Neutrophils, Absolute 2.79 1.70 - 7.00 10*3/mm3    Lymphocytes, Absolute 3.29 (H) 0.70 - 3.10 10*3/mm3    Monocytes, Absolute 0.47 0.10 - 0.90 10*3/mm3    Eosinophils, Absolute 0.14 0.00 - 0.40 10*3/mm3    Basophils, Absolute 0.02 0.00 - 0.20 10*3/mm3   Urine Drug Screen - Urine, Clean Catch   Result Value Ref Range    Amphet/Methamphet, Screen Negative Negative    Barbiturates Screen, Urine Negative Negative    Benzodiazepine Screen, Urine Negative Negative    Cocaine Screen, Urine Negative Negative    Opiate Screen Negative Negative    THC, Screen, Urine Negative Negative    Methadone Screen, Urine Negative Negative    Oxycodone Screen, Urine Negative Negative   Pregnancy, Urine - Urine, Clean Catch   Result Value Ref Range    HCG, Urine QL Negative Negative   Vitamin D 25 Hydroxy   Result Value Ref Range    25 Hydroxy, Vitamin D 47.7 30.0 - 100.0 ng/ml   TSH   Result Value Ref Range    TSH 1.220 0.270 - 4.200 mIU/mL   T4, Free   Result Value Ref Range    Free T4 1.53 0.93 -  1.70 ng/dL   Vitamin B12   Result Value Ref Range    Vitamin B-12 211 211 - 946 pg/mL   Comprehensive Metabolic Panel   Result Value Ref Range    Glucose 98 70 - 99 mg/dL    BUN 9 7 - 23 mg/dL    Creatinine 1.08 (H) 0.52 - 1.04 mg/dL    Sodium 140 137 - 145 mmol/L    Potassium 4.0 3.4 - 5.0 mmol/L    Chloride 104 101 - 112 mmol/L    CO2 27.0 22.0 - 30.0 mmol/L    Calcium 10.0 8.4 - 10.2 mg/dL    Total Protein 7.3 6.3 - 8.6 g/dL    Albumin 4.50 3.50 - 5.00 g/dL    ALT (SGPT) 14 <=35 U/L    AST (SGOT) 20 14 - 36 U/L    Alkaline Phosphatase 57 38 - 126 U/L    Total Bilirubin 0.7 0.2 - 1.3 mg/dL    eGFR Non  Amer 64 (L) 71 - 165 mL/min/1.73    Globulin 2.8 2.3 - 3.5 gm/dL    A/G Ratio 1.6 1.1 - 1.8 g/dL    BUN/Creatinine Ratio 8.3 7.0 - 25.0    Anion Gap 9.0 5.0 - 15.0 mmol/L   Results for orders placed or performed during the hospital encounter of 04/07/19   POC Urinalysis Dipstick, Multipro (Automated dipstick)   Result Value Ref Range    Color Dark Yellow Yellow, Straw, Dark Yellow, Yamile    Clarity, UA Cloudy (A) Clear    Glucose, UA Negative Negative, 1000 mg/dL (3+) mg/dL    Bilirubin Small (1+) (A) Negative    Ketones, UA Trace (A) Negative    Specific Gravity  1.030 1.005 - 1.030    Blood, UA Negative Negative    pH, Urine 5.5 5.0 - 8.0    Protein, POC 2+ (A) Negative mg/dL    Urobilinogen, UA Normal Normal    Nitrite, UA Negative Negative    Leukocytes Negative Negative   POCT Pregnancy, urine   Result Value Ref Range    HCG, Urine, QL Negative Negative    Lot Number [id number]     Internal Positive Control Positive     Internal Negative Control Negative    Results for orders placed or performed in visit on 10/29/18   Urinalysis, Microscopic Only - Urine, Clean Catch   Result Value Ref Range    RBC, UA Too Numerous to Count (A) None Seen /HPF    WBC, UA 3-5 (A) None Seen /HPF    Bacteria, UA 1+ (A) None Seen /HPF    Squamous Epithelial Cells, UA 7-12 (A) None Seen, 0-2 /HPF    Hyaline Casts, UA 0-2 None  Seen /LPF    Methodology Manual Light Microscopy      *Note: Due to a large number of results and/or encounters for the requested time period, some results have not been displayed. A complete set of results can be found in Results Review.

## 2020-05-14 ENCOUNTER — LAB (OUTPATIENT)
Dept: LAB | Facility: OTHER | Age: 20
End: 2020-05-14

## 2020-05-14 DIAGNOSIS — R63.6 UNDERWEIGHT: ICD-10-CM

## 2020-05-14 DIAGNOSIS — R10.84 GENERALIZED ABDOMINAL PAIN: ICD-10-CM

## 2020-05-14 DIAGNOSIS — E55.9 VITAMIN D DEFICIENCY: ICD-10-CM

## 2020-05-14 LAB
ALBUMIN SERPL-MCNC: 4.5 G/DL (ref 3.5–5)
ALBUMIN/GLOB SERPL: 1.6 G/DL (ref 1.1–1.8)
ALP SERPL-CCNC: 41 U/L (ref 38–126)
ALT SERPL W P-5'-P-CCNC: 10 U/L
ANION GAP SERPL CALCULATED.3IONS-SCNC: 8 MMOL/L (ref 5–15)
AST SERPL-CCNC: 18 U/L (ref 14–36)
BASOPHILS # BLD AUTO: 0.02 10*3/MM3 (ref 0–0.2)
BASOPHILS NFR BLD AUTO: 0.3 % (ref 0–1.5)
BILIRUB SERPL-MCNC: 0.4 MG/DL (ref 0.2–1.3)
BUN BLD-MCNC: 8 MG/DL (ref 7–23)
BUN/CREAT SERPL: 9.8 (ref 7–25)
CALCIUM SPEC-SCNC: 10 MG/DL (ref 8.4–10.2)
CHLORIDE SERPL-SCNC: 107 MMOL/L (ref 101–112)
CHOLEST SERPL-MCNC: 141 MG/DL (ref 150–200)
CO2 SERPL-SCNC: 27 MMOL/L (ref 22–30)
CREAT BLD-MCNC: 0.82 MG/DL (ref 0.52–1.04)
DEPRECATED RDW RBC AUTO: 48 FL (ref 37–54)
EOSINOPHIL # BLD AUTO: 0.15 10*3/MM3 (ref 0–0.4)
EOSINOPHIL NFR BLD AUTO: 2.5 % (ref 0.3–6.2)
ERYTHROCYTE [DISTWIDTH] IN BLOOD BY AUTOMATED COUNT: 14.7 % (ref 12.3–15.4)
GFR SERPL CREATININE-BSD FRML MDRD: 87 ML/MIN/1.73 (ref 71–165)
GLOBULIN UR ELPH-MCNC: 2.8 GM/DL (ref 2.3–3.5)
GLUCOSE BLD-MCNC: 67 MG/DL (ref 70–99)
HCT VFR BLD AUTO: 45.9 % (ref 34–46.6)
HDLC SERPL-MCNC: 79 MG/DL (ref 40–59)
HGB BLD-MCNC: 14.4 G/DL (ref 12–15.9)
LDLC SERPL CALC-MCNC: 43 MG/DL
LDLC/HDLC SERPL: 0.54 {RATIO} (ref 0–3.22)
LYMPHOCYTES # BLD AUTO: 2.29 10*3/MM3 (ref 0.7–3.1)
LYMPHOCYTES NFR BLD AUTO: 38.6 % (ref 19.6–45.3)
MCH RBC QN AUTO: 28.9 PG (ref 26.6–33)
MCHC RBC AUTO-ENTMCNC: 31.4 G/DL (ref 31.5–35.7)
MCV RBC AUTO: 92.2 FL (ref 79–97)
MONOCYTES # BLD AUTO: 0.42 10*3/MM3 (ref 0.1–0.9)
MONOCYTES NFR BLD AUTO: 7.1 % (ref 5–12)
NEUTROPHILS # BLD AUTO: 3.06 10*3/MM3 (ref 1.7–7)
NEUTROPHILS NFR BLD AUTO: 51.5 % (ref 42.7–76)
PLATELET # BLD AUTO: 250 10*3/MM3 (ref 140–450)
PMV BLD AUTO: 10.2 FL (ref 6–12)
POTASSIUM BLD-SCNC: 4 MMOL/L (ref 3.4–5)
PROT SERPL-MCNC: 7.3 G/DL (ref 6.3–8.6)
RBC # BLD AUTO: 4.98 10*6/MM3 (ref 3.77–5.28)
SODIUM BLD-SCNC: 142 MMOL/L (ref 137–145)
TRIGL SERPL-MCNC: 97 MG/DL
VLDLC SERPL-MCNC: 19.4 MG/DL
WBC NRBC COR # BLD: 5.94 10*3/MM3 (ref 3.4–10.8)

## 2020-05-14 PROCEDURE — 82306 VITAMIN D 25 HYDROXY: CPT | Performed by: NURSE PRACTITIONER

## 2020-05-14 PROCEDURE — 84439 ASSAY OF FREE THYROXINE: CPT | Performed by: NURSE PRACTITIONER

## 2020-05-14 PROCEDURE — 82607 VITAMIN B-12: CPT | Performed by: NURSE PRACTITIONER

## 2020-05-14 PROCEDURE — 80061 LIPID PANEL: CPT | Performed by: NURSE PRACTITIONER

## 2020-05-14 PROCEDURE — 80050 GENERAL HEALTH PANEL: CPT | Performed by: NURSE PRACTITIONER

## 2020-05-15 LAB
25(OH)D3 SERPL-MCNC: 38.3 NG/ML (ref 30–100)
T4 FREE SERPL-MCNC: 1.37 NG/DL (ref 0.93–1.7)
TSH SERPL DL<=0.05 MIU/L-ACNC: 1.52 UIU/ML (ref 0.27–4.2)
VIT B12 BLD-MCNC: 901 PG/ML (ref 211–946)

## 2020-05-19 ENCOUNTER — TELEMEDICINE (OUTPATIENT)
Dept: FAMILY MEDICINE CLINIC | Facility: CLINIC | Age: 20
End: 2020-05-19

## 2020-05-19 DIAGNOSIS — K21.9 GASTROESOPHAGEAL REFLUX DISEASE, ESOPHAGITIS PRESENCE NOT SPECIFIED: ICD-10-CM

## 2020-05-19 DIAGNOSIS — E53.8 LOW SERUM VITAMIN B12: Primary | ICD-10-CM

## 2020-05-19 PROCEDURE — 99213 OFFICE O/P EST LOW 20 MIN: CPT | Performed by: NURSE PRACTITIONER

## 2020-05-20 RX ORDER — PROMETHAZINE HYDROCHLORIDE 25 MG/1
TABLET ORAL
Qty: 30 TABLET | Refills: 2 | OUTPATIENT
Start: 2020-05-20

## 2020-05-21 RX ORDER — PROMETHAZINE HYDROCHLORIDE 25 MG/1
TABLET ORAL
Qty: 30 TABLET | Refills: 2 | OUTPATIENT
Start: 2020-05-21

## 2020-05-22 ENCOUNTER — CLINICAL SUPPORT (OUTPATIENT)
Dept: OBSTETRICS AND GYNECOLOGY | Facility: CLINIC | Age: 20
End: 2020-05-22

## 2020-05-22 DIAGNOSIS — Z30.42 SURVEILLANCE FOR DEPO-PROVERA CONTRACEPTION: Primary | ICD-10-CM

## 2020-05-22 PROCEDURE — 96372 THER/PROPH/DIAG INJ SC/IM: CPT | Performed by: NURSE PRACTITIONER

## 2020-05-22 RX ORDER — MEDROXYPROGESTERONE ACETATE 150 MG/ML
INJECTION, SUSPENSION INTRAMUSCULAR
Qty: 1 ML | Refills: 3 | Status: SHIPPED | OUTPATIENT
Start: 2020-05-22 | End: 2021-02-19 | Stop reason: SDUPTHER

## 2020-05-22 RX ORDER — MEDROXYPROGESTERONE ACETATE 150 MG/ML
150 INJECTION, SUSPENSION INTRAMUSCULAR ONCE
Status: COMPLETED | OUTPATIENT
Start: 2020-05-22 | End: 2020-05-22

## 2020-05-22 RX ADMIN — MEDROXYPROGESTERONE ACETATE 150 MG: 150 INJECTION, SUSPENSION INTRAMUSCULAR at 13:50

## 2020-06-07 PROBLEM — E53.8 LOW SERUM VITAMIN B12: Status: ACTIVE | Noted: 2020-06-07

## 2020-06-07 NOTE — PROGRESS NOTES
Subjective   Mandi Walters is a 20 y.o. female who presents to the office for vit B12 level follow-up and review of labs.   You have chosen to receive care through a telehealth visit.  Do you consent to use a video/audio connection for your medical care today? Yes    History of Present Illness     The following portions of the patient's history were reviewed and updated as appropriate: allergies, current medications, past family history, past medical history, past social history, past surgical history and problem list.    Review of Systems   Constitutional: Negative for chills, fatigue and fever.   HENT: Negative for congestion, sneezing, sore throat and trouble swallowing.    Eyes: Negative for visual disturbance.   Respiratory: Negative for cough, chest tightness, shortness of breath and wheezing.    Cardiovascular: Negative for chest pain, palpitations and leg swelling.   Gastrointestinal: Negative for abdominal pain, constipation, diarrhea, nausea and vomiting.   Genitourinary: Negative for dysuria, frequency and urgency.   Musculoskeletal: Negative for neck pain.   Skin: Negative for rash.   Neurological: Negative for dizziness, weakness and headaches.   Psychiatric/Behavioral:        In the past two weeks the pt has not felt down, depressed, hopeless or lost interest in doing things   All other systems reviewed and are negative.      Objective   Physical Exam   Constitutional: She is oriented to person, place, and time. She appears well-developed and well-nourished. No distress.   HENT:   Head: Normocephalic.   Right Ear: External ear normal.   Left Ear: External ear normal.   Eyes: EOM are normal.   Pulmonary/Chest: Effort normal.   Neurological: She is alert and oriented to person, place, and time.       Assessment/Plan   Diagnoses and all orders for this visit:    Gastroesophageal reflux disease, esophagitis presence not specified    Low serum vitamin B12    Encouraged medications as ordered.  Eat a  healthy diet.  Asked about Phenergan refill which I stated she would need to ask her GI specialist.     Labs are reviewed with patient.      Lab on 05/14/2020   Component Date Value Ref Range Status   • Vitamin B-12 05/14/2020 901  211 - 946 pg/mL Final   • 25 Hydroxy, Vitamin D 05/14/2020 38.3  30.0 - 100.0 ng/ml Final   • Glucose 05/14/2020 67* 70 - 99 mg/dL Final   • BUN 05/14/2020 8  7 - 23 mg/dL Final   • Creatinine 05/14/2020 0.82  0.52 - 1.04 mg/dL Final   • Sodium 05/14/2020 142  137 - 145 mmol/L Final   • Potassium 05/14/2020 4.0  3.4 - 5.0 mmol/L Final   • Chloride 05/14/2020 107  101 - 112 mmol/L Final   • CO2 05/14/2020 27.0  22.0 - 30.0 mmol/L Final   • Calcium 05/14/2020 10.0  8.4 - 10.2 mg/dL Final   • Total Protein 05/14/2020 7.3  6.3 - 8.6 g/dL Final   • Albumin 05/14/2020 4.50  3.50 - 5.00 g/dL Final   • ALT (SGPT) 05/14/2020 10  <=35 U/L Final   • AST (SGOT) 05/14/2020 18  14 - 36 U/L Final   • Alkaline Phosphatase 05/14/2020 41  38 - 126 U/L Final   • Total Bilirubin 05/14/2020 0.4  0.2 - 1.3 mg/dL Final   • eGFR Non  Amer 05/14/2020 87  71 - 165 mL/min/1.73 Final   • Globulin 05/14/2020 2.8  2.3 - 3.5 gm/dL Final   • A/G Ratio 05/14/2020 1.6  1.1 - 1.8 g/dL Final   • BUN/Creatinine Ratio 05/14/2020 9.8  7.0 - 25.0 Final   • Anion Gap 05/14/2020 8.0  5.0 - 15.0 mmol/L Final   • Total Cholesterol 05/14/2020 141* 150 - 200 mg/dL Final   • Triglycerides 05/14/2020 97  <=150 mg/dL Final   • HDL Cholesterol 05/14/2020 79* 40 - 59 mg/dL Final   • LDL Cholesterol  05/14/2020 43  <=100 mg/dL Final   • VLDL Cholesterol 05/14/2020 19.4  mg/dL Final   • LDL/HDL Ratio 05/14/2020 0.54  0.00 - 3.22 Final   • TSH 05/14/2020 1.520  0.270 - 4.200 uIU/mL Final    TSH results may be falsely decreased if patient taking Biotin.   • Free T4 05/14/2020 1.37  0.93 - 1.70 ng/dL Final   • WBC 05/14/2020 5.94  3.40 - 10.80 10*3/mm3 Final   • RBC 05/14/2020 4.98  3.77 - 5.28 10*6/mm3 Final   • Hemoglobin 05/14/2020  14.4  12.0 - 15.9 g/dL Final   • Hematocrit 05/14/2020 45.9  34.0 - 46.6 % Final   • MCV 05/14/2020 92.2  79.0 - 97.0 fL Final   • MCH 05/14/2020 28.9  26.6 - 33.0 pg Final   • MCHC 05/14/2020 31.4* 31.5 - 35.7 g/dL Final   • RDW 05/14/2020 14.7  12.3 - 15.4 % Final   • RDW-SD 05/14/2020 48.0  37.0 - 54.0 fl Final   • MPV 05/14/2020 10.2  6.0 - 12.0 fL Final   • Platelets 05/14/2020 250  140 - 450 10*3/mm3 Final   • Neutrophil % 05/14/2020 51.5  42.7 - 76.0 % Final   • Lymphocyte % 05/14/2020 38.6  19.6 - 45.3 % Final   • Monocyte % 05/14/2020 7.1  5.0 - 12.0 % Final   • Eosinophil % 05/14/2020 2.5  0.3 - 6.2 % Final   • Basophil % 05/14/2020 0.3  0.0 - 1.5 % Final   • Neutrophils, Absolute 05/14/2020 3.06  1.70 - 7.00 10*3/mm3 Final   • Lymphocytes, Absolute 05/14/2020 2.29  0.70 - 3.10 10*3/mm3 Final   • Monocytes, Absolute 05/14/2020 0.42  0.10 - 0.90 10*3/mm3 Final   • Eosinophils, Absolute 05/14/2020 0.15  0.00 - 0.40 10*3/mm3 Final   • Basophils, Absolute 05/14/2020 0.02  0.00 - 0.20 10*3/mm3 Final   ]

## 2020-06-23 ENCOUNTER — APPOINTMENT (OUTPATIENT)
Dept: NUCLEAR MEDICINE | Facility: HOSPITAL | Age: 20
End: 2020-06-23

## 2020-08-07 ENCOUNTER — CLINICAL SUPPORT (OUTPATIENT)
Dept: OBSTETRICS AND GYNECOLOGY | Facility: CLINIC | Age: 20
End: 2020-08-07

## 2020-08-07 VITALS — WEIGHT: 112.6 LBS | BODY MASS INDEX: 18.74 KG/M2

## 2020-08-07 DIAGNOSIS — Z30.42 SURVEILLANCE FOR DEPO-PROVERA CONTRACEPTION: Primary | ICD-10-CM

## 2020-08-07 PROCEDURE — 96372 THER/PROPH/DIAG INJ SC/IM: CPT | Performed by: NURSE PRACTITIONER

## 2020-08-07 RX ORDER — MEDROXYPROGESTERONE ACETATE 150 MG/ML
150 INJECTION, SUSPENSION INTRAMUSCULAR
Status: COMPLETED | OUTPATIENT
Start: 2020-08-07 | End: 2021-04-06

## 2020-08-07 RX ADMIN — MEDROXYPROGESTERONE ACETATE 150 MG: 150 INJECTION, SUSPENSION INTRAMUSCULAR at 11:50

## 2020-10-21 ENCOUNTER — OFFICE VISIT (OUTPATIENT)
Dept: GASTROENTEROLOGY | Facility: CLINIC | Age: 20
End: 2020-10-21

## 2020-10-21 VITALS
BODY MASS INDEX: 18.73 KG/M2 | HEIGHT: 65 IN | SYSTOLIC BLOOD PRESSURE: 121 MMHG | WEIGHT: 112.4 LBS | HEART RATE: 84 BPM | DIASTOLIC BLOOD PRESSURE: 74 MMHG

## 2020-10-21 DIAGNOSIS — R11.0 NAUSEA: ICD-10-CM

## 2020-10-21 DIAGNOSIS — K21.00 GASTROESOPHAGEAL REFLUX DISEASE WITH ESOPHAGITIS, UNSPECIFIED WHETHER HEMORRHAGE: Primary | ICD-10-CM

## 2020-10-21 PROCEDURE — 99213 OFFICE O/P EST LOW 20 MIN: CPT | Performed by: NURSE PRACTITIONER

## 2020-10-21 RX ORDER — PANTOPRAZOLE SODIUM 40 MG/1
40 TABLET, DELAYED RELEASE ORAL DAILY
Qty: 30 TABLET | Refills: 5 | Status: SHIPPED | OUTPATIENT
Start: 2020-10-21 | End: 2021-03-02

## 2020-10-21 RX ORDER — PROMETHAZINE HYDROCHLORIDE 25 MG/1
25 TABLET ORAL EVERY 6 HOURS PRN
Qty: 30 TABLET | Refills: 2 | Status: SHIPPED | OUTPATIENT
Start: 2020-10-21 | End: 2021-03-02

## 2020-10-21 NOTE — PROGRESS NOTES
Chief Complaint   Patient presents with   • Abdominal Pain   • Nausea   • Heartburn       Subjective    Mandi Walters is a 20 y.o. female. she is here today for follow-up.    History of Present Illness  20-year-old female with reflux abdominal pain presents for follow-up.  States she has been doing well but is concerned about her weight loss she is down 6 pounds from last visit in office .  She was seen for sterilization in [facility] in her weight was actually the same at the visit there.  Report reflux is well controlled with PPI would like a refill of that.  Has not required famotidine in some time.  She did not do gastric emptying because she reports she is not vomiting routinely and is only needing the Phenergan on an as-needed basis.     The following portions of the patient's history were reviewed and updated as appropriate:   Past Medical History:   Diagnosis Date   • Bacterial vaginosis    • Costal chondritis    • Fatigue    • GERD (gastroesophageal reflux disease)    • Hypoglycemia    • Malaise and fatigue    • Menorrhagia    • Well child visit      Past Surgical History:   Procedure Laterality Date   • ENDOSCOPY N/A 2019    Procedure: ESOPHAGOGASTRODUODENOSCOPY possible dilation;  Surgeon: Guicho Quiñonez MD;  Location: [facility] ENDOSCOPY;  Service: Gastroenterology   • TYMPANOSTOMY TUBE PLACEMENT      Tympanostomy tube-2 (1)      Family History   Problem Relation Age of Onset   • Osteoarthritis Mother    • Diabetes Maternal Grandmother    • Heart disease Maternal Grandmother    • Osteoarthritis Maternal Grandmother    • Coronary artery disease Paternal Grandfather      OB History        1    Para        Term                AB   1    Living           SAB   1    TAB        Ectopic        Molar        Multiple        Live Births                  Prior to Admission medications    Medication Sig Start Date End Date Taking? Authorizing Provider   Cyanocobalamin 1000  "MCG/ML kit One injection monthly 5/19/20  Yes Idalmis Peter APRN   docusate sodium (Colace) 100 MG capsule Take 1 capsule by mouth 2 (Two) Times a Day As Needed for Constipation. 4/21/20  Yes Sherrell Bull APRN   famotidine (Pepcid) 40 MG tablet Take 1 tablet by mouth At Night As Needed for Heartburn. 4/21/20  Yes Sherrell Bull APRN   medroxyPROGESTERone (DEPO-PROVERA) 150 MG/ML injection INJECT 1 ML INTO THE APPROPRIATE MUSCLE AS DIRECTED BY PRESCRIBER EVERY 3 (THREE) MONTHS. 5/22/20  Yes Tena Porras APRN   pantoprazole (PROTONIX) 40 MG EC tablet Take 1 tablet by mouth Daily. 4/21/20  Yes Sherrell Bull APRN   promethazine (PHENERGAN) 25 MG tablet Take 1 tablet by mouth Every 6 (Six) Hours As Needed for Nausea or Vomiting. 4/21/20  Yes Sherrell Bull APRN   Syringe/Needle, Disp, (B-D SYRINGE/NEEDLE 3CC/23GX1\") 23G X 1\" 3 ML misc One monthly with b12 injection 5/19/20  Yes Idalmis Peter APRN     Allergies   Allergen Reactions   • Amoxicillin Anaphylaxis     Anaphylaxis   • Loratadine Unknown - High Severity     Unknown   • Penicillins Anaphylaxis   • Codeine Rash     Social History     Socioeconomic History   • Marital status: Single     Spouse name: Not on file   • Number of children: Not on file   • Years of education: Not on file   • Highest education level: Not on file   Tobacco Use   • Smoking status: Current Some Day Smoker     Packs/day: 0.50     Years: 7.00     Pack years: 3.50   • Smokeless tobacco: Never Used   Substance and Sexual Activity   • Alcohol use: No   • Drug use: No   • Sexual activity: Yes     Partners: Male     Birth control/protection: Injection       Review of Systems  Review of Systems   Constitutional: Negative for activity change, appetite change, chills, diaphoresis, fatigue, fever and unexpected weight change.   HENT: Negative for sore throat and trouble swallowing.    Respiratory: Negative for shortness of breath.    Gastrointestinal: Positive for nausea. " "Negative for abdominal distention, abdominal pain, anal bleeding, blood in stool, constipation, diarrhea, rectal pain and vomiting.   Musculoskeletal: Negative for arthralgias.   Skin: Negative for pallor.   Neurological: Negative for light-headedness.        /74 (BP Location: Left arm)   Pulse 84   Ht 165.1 cm (65\")   Wt 51 kg (112 lb 6.4 oz)   BMI 18.70 kg/m²     Objective    Physical Exam  Constitutional:       General: She is not in acute distress.     Appearance: Normal appearance. She is well-developed.   HENT:      Head: Normocephalic and atraumatic.   Neck:      Musculoskeletal: Normal range of motion and neck supple.      Thyroid: No thyromegaly.   Cardiovascular:      Rate and Rhythm: Normal rate and regular rhythm.      Heart sounds: Normal heart sounds.   Pulmonary:      Effort: Pulmonary effort is normal.      Breath sounds: Normal breath sounds. No wheezing, rhonchi or rales.   Abdominal:      General: Bowel sounds are normal. There is no distension.      Palpations: Abdomen is soft. Abdomen is not rigid. There is no shifting dullness or fluid wave.      Tenderness: There is abdominal tenderness in the epigastric area. There is no guarding.      Hernia: No hernia is present.   Lymphadenopathy:      Cervical: No cervical adenopathy.   Skin:     General: Skin is warm and dry.      Coloration: Skin is not pale.      Findings: No rash.   Neurological:      Mental Status: She is alert and oriented to person, place, and time.   Psychiatric:         Speech: Speech normal.         Behavior: Behavior is cooperative.       Lab on 05/14/2020   Component Date Value Ref Range Status   • Vitamin B-12 05/14/2020 901  211 - 946 pg/mL Final   • 25 Hydroxy, Vitamin D 05/14/2020 38.3  30.0 - 100.0 ng/ml Final   • Glucose 05/14/2020 67* 70 - 99 mg/dL Final   • BUN 05/14/2020 8  7 - 23 mg/dL Final   • Creatinine 05/14/2020 0.82  0.52 - 1.04 mg/dL Final   • Sodium 05/14/2020 142  137 - 145 mmol/L Final   • " Potassium 05/14/2020 4.0  3.4 - 5.0 mmol/L Final   • Chloride 05/14/2020 107  101 - 112 mmol/L Final   • CO2 05/14/2020 27.0  22.0 - 30.0 mmol/L Final   • Calcium 05/14/2020 10.0  8.4 - 10.2 mg/dL Final   • Total Protein 05/14/2020 7.3  6.3 - 8.6 g/dL Final   • Albumin 05/14/2020 4.50  3.50 - 5.00 g/dL Final   • ALT (SGPT) 05/14/2020 10  <=35 U/L Final   • AST (SGOT) 05/14/2020 18  14 - 36 U/L Final   • Alkaline Phosphatase 05/14/2020 41  38 - 126 U/L Final   • Total Bilirubin 05/14/2020 0.4  0.2 - 1.3 mg/dL Final   • eGFR Non  Amer 05/14/2020 87  71 - 165 mL/min/1.73 Final   • Globulin 05/14/2020 2.8  2.3 - 3.5 gm/dL Final   • A/G Ratio 05/14/2020 1.6  1.1 - 1.8 g/dL Final   • BUN/Creatinine Ratio 05/14/2020 9.8  7.0 - 25.0 Final   • Anion Gap 05/14/2020 8.0  5.0 - 15.0 mmol/L Final   • Total Cholesterol 05/14/2020 141* 150 - 200 mg/dL Final   • Triglycerides 05/14/2020 97  <=150 mg/dL Final   • HDL Cholesterol 05/14/2020 79* 40 - 59 mg/dL Final   • LDL Cholesterol  05/14/2020 43  <=100 mg/dL Final   • VLDL Cholesterol 05/14/2020 19.4  mg/dL Final   • LDL/HDL Ratio 05/14/2020 0.54  0.00 - 3.22 Final   • TSH 05/14/2020 1.520  0.270 - 4.200 uIU/mL Final    TSH results may be falsely decreased if patient taking Biotin.   • Free T4 05/14/2020 1.37  0.93 - 1.70 ng/dL Final   • WBC 05/14/2020 5.94  3.40 - 10.80 10*3/mm3 Final   • RBC 05/14/2020 4.98  3.77 - 5.28 10*6/mm3 Final   • Hemoglobin 05/14/2020 14.4  12.0 - 15.9 g/dL Final   • Hematocrit 05/14/2020 45.9  34.0 - 46.6 % Final   • MCV 05/14/2020 92.2  79.0 - 97.0 fL Final   • MCH 05/14/2020 28.9  26.6 - 33.0 pg Final   • MCHC 05/14/2020 31.4* 31.5 - 35.7 g/dL Final   • RDW 05/14/2020 14.7  12.3 - 15.4 % Final   • RDW-SD 05/14/2020 48.0  37.0 - 54.0 fl Final   • MPV 05/14/2020 10.2  6.0 - 12.0 fL Final   • Platelets 05/14/2020 250  140 - 450 10*3/mm3 Final   • Neutrophil % 05/14/2020 51.5  42.7 - 76.0 % Final   • Lymphocyte % 05/14/2020 38.6  19.6 - 45.3 %  Final   • Monocyte % 05/14/2020 7.1  5.0 - 12.0 % Final   • Eosinophil % 05/14/2020 2.5  0.3 - 6.2 % Final   • Basophil % 05/14/2020 0.3  0.0 - 1.5 % Final   • Neutrophils, Absolute 05/14/2020 3.06  1.70 - 7.00 10*3/mm3 Final   • Lymphocytes, Absolute 05/14/2020 2.29  0.70 - 3.10 10*3/mm3 Final   • Monocytes, Absolute 05/14/2020 0.42  0.10 - 0.90 10*3/mm3 Final   • Eosinophils, Absolute 05/14/2020 0.15  0.00 - 0.40 10*3/mm3 Final   • Basophils, Absolute 05/14/2020 0.02  0.00 - 0.20 10*3/mm3 Final     Assessment/Plan      1. Gastroesophageal reflux disease with esophagitis, unspecified whether hemorrhage    2. Nausea    .   Continue PPI daily will add Phenergan as needed for nausea discussed with patient importance of taking only on an as-needed basis not daily.  She verbalized understanding.  Follow-up in 1 month for weight recheck if there is further loss will plan CT abdomen pelvis.    Orders placed during this encounter include:  No orders of the defined types were placed in this encounter.      * Surgery not found *    Review and/or summary of lab tests, radiology, procedures, medications. Review and summary of old records and obtaining of history. The risks and benefits of my recommendations, as well as other treatment options were discussed with the patient today. Questions were answered.    New Medications Ordered This Visit   Medications   • promethazine (PHENERGAN) 25 MG tablet     Sig: Take 1 tablet by mouth Every 6 (Six) Hours As Needed for Nausea or Vomiting.     Dispense:  30 tablet     Refill:  2   • pantoprazole (PROTONIX) 40 MG EC tablet     Sig: Take 1 tablet by mouth Daily.     Dispense:  30 tablet     Refill:  5       Follow-up: Return in about 4 weeks (around 11/18/2020).          This document has been electronically signed by ANH Xiao on October 21, 2020 11:56 CDT             Results for orders placed or performed in visit on 05/14/20   CBC Auto Differential    Specimen: Blood      Result Value Ref Range    WBC 5.94 3.40 - 10.80 10*3/mm3    RBC 4.98 3.77 - 5.28 10*6/mm3    Hemoglobin 14.4 12.0 - 15.9 g/dL    Hematocrit 45.9 34.0 - 46.6 %    MCV 92.2 79.0 - 97.0 fL    MCH 28.9 26.6 - 33.0 pg    MCHC 31.4 (L) 31.5 - 35.7 g/dL    RDW 14.7 12.3 - 15.4 %    RDW-SD 48.0 37.0 - 54.0 fl    MPV 10.2 6.0 - 12.0 fL    Platelets 250 140 - 450 10*3/mm3    Neutrophil % 51.5 42.7 - 76.0 %    Lymphocyte % 38.6 19.6 - 45.3 %    Monocyte % 7.1 5.0 - 12.0 %    Eosinophil % 2.5 0.3 - 6.2 %    Basophil % 0.3 0.0 - 1.5 %    Neutrophils, Absolute 3.06 1.70 - 7.00 10*3/mm3    Lymphocytes, Absolute 2.29 0.70 - 3.10 10*3/mm3    Monocytes, Absolute 0.42 0.10 - 0.90 10*3/mm3    Eosinophils, Absolute 0.15 0.00 - 0.40 10*3/mm3    Basophils, Absolute 0.02 0.00 - 0.20 10*3/mm3   Vitamin D 25 Hydroxy    Specimen: Blood   Result Value Ref Range    25 Hydroxy, Vitamin D 38.3 30.0 - 100.0 ng/ml   TSH    Specimen: Blood   Result Value Ref Range    TSH 1.520 0.270 - 4.200 uIU/mL   T4, Free    Specimen: Blood   Result Value Ref Range    Free T4 1.37 0.93 - 1.70 ng/dL   Vitamin B12    Specimen: Blood   Result Value Ref Range    Vitamin B-12 901 211 - 946 pg/mL   Lipid Panel    Specimen: Blood   Result Value Ref Range    Total Cholesterol 141 (L) 150 - 200 mg/dL    Triglycerides 97 <=150 mg/dL    HDL Cholesterol 79 (H) 40 - 59 mg/dL    LDL Cholesterol  43 <=100 mg/dL    VLDL Cholesterol 19.4 mg/dL    LDL/HDL Ratio 0.54 0.00 - 3.22   Comprehensive Metabolic Panel    Specimen: Blood   Result Value Ref Range    Glucose 67 (L) 70 - 99 mg/dL    BUN 8 7 - 23 mg/dL    Creatinine 0.82 0.52 - 1.04 mg/dL    Sodium 142 137 - 145 mmol/L    Potassium 4.0 3.4 - 5.0 mmol/L    Chloride 107 101 - 112 mmol/L    CO2 27.0 22.0 - 30.0 mmol/L    Calcium 10.0 8.4 - 10.2 mg/dL    Total Protein 7.3 6.3 - 8.6 g/dL    Albumin 4.50 3.50 - 5.00 g/dL    ALT (SGPT) 10 <=35 U/L    AST (SGOT) 18 14 - 36 U/L    Alkaline Phosphatase 41 38 - 126 U/L    Total  Bilirubin 0.4 0.2 - 1.3 mg/dL    eGFR Non  Amer 87 71 - 165 mL/min/1.73    Globulin 2.8 2.3 - 3.5 gm/dL    A/G Ratio 1.6 1.1 - 1.8 g/dL    BUN/Creatinine Ratio 9.8 7.0 - 25.0    Anion Gap 8.0 5.0 - 15.0 mmol/L   Results for orders placed or performed in visit on 10/08/19   Liquid-based Pap Smear, Screening    Specimen: Cervix; ThinPrep Vial   Result Value Ref Range    Case Report       Gynecologic Cytology Report                       Case: [id number]                                  Authorizing Provider:  Tena Porras    Collected:           10/08/2019 02:00 PM                                 ANH Wynne                                                                  Ordering Location:     [facility]     Received:            10/08/2019 04:01 PM                                 [facility]                                                               First Screen:          Matilda Renee                                                              Pathologist:           Fabio Jacobs MD                                                           Specimen:    Liquid-Based Pap, Screening, Cervix                                                        Interpretation Negative for intraepithelial lesion or malignancy      General Categorization Within normal limits     Other Findings Reactive cellular changes     Specimen Adequacy       Satisfactory for evaluation, endocervical/transformation zone component present    Additional Information       Disclaimer: Cervical cytology is a screening test primarily for squamous cancer and its precursors and has associated false-negative and false-positive results.  Technologies such as liquid-based preparations may decrease but will not eliminate all false-negative results.  Follow-up of unexplained clinical signs and symptoms is recommended to minimize false-negative results. (The [place] System for Reporting Cervical Cytology: Beth, 2015).      Results for orders placed or performed during the hospital encounter of 07/23/19   Tissue Pathology Exam    Specimen: A: Small Intestine, Duodenum; Tissue    B: Gastric, Antrum; Tissue    C: Esophagus, Distal; Tissue   Result Value Ref Range    Case Report       Surgical Pathology Report                         Case: [id number]                                  Authorizing Provider:  Guicho Quiñonez MD        Collected:           07/23/2019 02:33 PM          Ordering Location:     [facility]             Received:            07/24/2019 06:48 AM                                 [facility]                                                     Pathologist:           Fabio Jacobs MD                                                           Specimens:   1) - Small Intestine, Duodenum, small bowel bx                                                      2) - Gastric, Antrum, antrum bx                                                                     3) - Esophagus, Distal, distal esophagus bx                                                Final Diagnosis       1.  SMALL BOWEL, BIOPSY:  NO SIGNIFICANT HISTOLOGIC ABNORMALITY.    2.  GASTRIC ANTRUM, BIOPSY:  NO SIGNIFICANT HISTOLOGIC ABNORMALITY.    3.  DISTAL ESOPHAGUS, BIOPSY:  BENIGN SQUAMOUS MUCOSA.      Gross Description       1.  Two tan fragments measure 0.8 x 0.5 x 0.2 cm together.  Totally submitted.    2.  Two tan fragments measure 0.5 x 0.5 x 0.2 cm together.  Totally submitted.    3.  Two gray fragments measure 0.8 x 0.7 x 0.1 cm together.  Totally submitted.         Pregnancy, Urine -    Specimen: Urine   Result Value Ref Range    HCG, Urine QL Negative Negative   Results for orders placed or performed in visit on 06/14/19   Chlamydia trachomatis, Neisseria gonorrhoeae, Trichomonas vaginalis, PCR - Urine, Urine, Clean Catch    Specimen: Urine, Clean Catch   Result Value Ref Range    Chlamydia DNA by PCR Negative Negative    Neisseria  gonorrhoeae by PCR Negative Negative    Trichomonas vaginalis PCR Negative    Results for orders placed or performed in visit on 05/07/19   Urinalysis With Culture If Indicated - Urine, Clean Catch    Specimen: Urine, Clean Catch   Result Value Ref Range    Color, UA Yellow Yellow, Straw    Appearance, UA Clear Clear    pH, UA 5.5 5.5 - 8.0    Specific Gravity, UA >=1.030 1.005 - 1.030    Glucose, UA Negative Negative    Ketones, UA Trace (A) Negative    Bilirubin, UA Negative Negative    Blood, UA Negative Negative    Protein, UA Negative Negative    Leuk Esterase, UA Negative Negative    Nitrite, UA Negative Negative    Urobilinogen, UA 1.0 E.U./dL 0.2 - 1.0 E.U./dL   CBC Auto Differential    Specimen: Blood   Result Value Ref Range    WBC 6.71 3.40 - 10.80 10*3/mm3    RBC 4.99 3.77 - 5.28 10*6/mm3    Hemoglobin 14.5 12.0 - 15.9 g/dL    Hematocrit 44.6 34.0 - 46.6 %    MCV 89.4 79.0 - 97.0 fL    MCH 29.1 26.6 - 33.0 pg    MCHC 32.5 31.5 - 35.7 g/dL    RDW 14.4 12.3 - 15.4 %    RDW-SD 45.8 37.0 - 54.0 fl    MPV 10.4 6.0 - 12.0 fL    Platelets 261 140 - 450 10*3/mm3    Neutrophil % 41.6 (L) 42.7 - 76.0 %    Lymphocyte % 49.0 (H) 19.6 - 45.3 %    Monocyte % 7.0 5.0 - 12.0 %    Eosinophil % 2.1 0.3 - 6.2 %    Basophil % 0.3 0.0 - 1.5 %    Neutrophils, Absolute 2.79 1.70 - 7.00 10*3/mm3    Lymphocytes, Absolute 3.29 (H) 0.70 - 3.10 10*3/mm3    Monocytes, Absolute 0.47 0.10 - 0.90 10*3/mm3    Eosinophils, Absolute 0.14 0.00 - 0.40 10*3/mm3    Basophils, Absolute 0.02 0.00 - 0.20 10*3/mm3   Urine Drug Screen - Urine, Clean Catch    Specimen: Urine, Clean Catch   Result Value Ref Range    Amphet/Methamphet, Screen Negative Negative    Barbiturates Screen, Urine Negative Negative    Benzodiazepine Screen, Urine Negative Negative    Cocaine Screen, Urine Negative Negative    Opiate Screen Negative Negative    THC, Screen, Urine Negative Negative    Methadone Screen, Urine Negative Negative    Oxycodone Screen, Urine  Negative Negative     *Note: Due to a large number of results and/or encounters for the requested time period, some results have not been displayed. A complete set of results can be found in Results Review.

## 2020-10-21 NOTE — PATIENT INSTRUCTIONS
Gastroesophageal Reflux Disease, Adult  Gastroesophageal reflux (YENIFER) happens when acid from the stomach flows up into the tube that connects the mouth and the stomach (esophagus). Normally, food travels down the esophagus and stays in the stomach to be digested. However, when a person has YENIFER, food and stomach acid sometimes move back up into the esophagus. If this becomes a more serious problem, the person may be diagnosed with a disease called gastroesophageal reflux disease (GERD). GERD occurs when the reflux:  · Happens often.  · Causes frequent or severe symptoms.  · Causes problems such as damage to the esophagus.  When stomach acid comes in contact with the esophagus, the acid may cause soreness (inflammation) in the esophagus. Over time, GERD may create small holes (ulcers) in the lining of the esophagus.  What are the causes?  This condition is caused by a problem with the muscle between the esophagus and the stomach (lower esophageal sphincter, or LES). Normally, the LES muscle closes after food passes through the esophagus to the stomach. When the LES is weakened or abnormal, it does not close properly, and that allows food and stomach acid to go back up into the esophagus.  The LES can be weakened by certain dietary substances, medicines, and medical conditions, including:  · Tobacco use.  · Pregnancy.  · Having a hiatal hernia.  · Alcohol use.  · Certain foods and beverages, such as coffee, chocolate, onions, and peppermint.  What increases the risk?  You are more likely to develop this condition if you:  · Have an increased body weight.  · Have a connective tissue disorder.  · Use NSAID medicines.  What are the signs or symptoms?  Symptoms of this condition include:  · Heartburn.  · Difficult or painful swallowing.  · The feeling of having a lump in the throat.  · A bitter taste in the mouth.  · Bad breath.  · Having a large amount of saliva.  · Having an upset or bloated  stomach.  · Belching.  · Chest pain. Different conditions can cause chest pain. Make sure you see your health care provider if you experience chest pain.  · Shortness of breath or wheezing.  · Ongoing (chronic) cough or a night-time cough.  · Wearing away of tooth enamel.  · Weight loss.  How is this diagnosed?  Your health care provider will take a medical history and perform a physical exam. To determine if you have mild or severe GERD, your health care provider may also monitor how you respond to treatment. You may also have tests, including:  · A test to examine your stomach and esophagus with a small camera (endoscopy).  · A test that measures the acidity level in your esophagus.  · A test that measures how much pressure is on your esophagus.  · A barium swallow or modified barium swallow test to show the shape, size, and functioning of your esophagus.  How is this treated?  The goal of treatment is to help relieve your symptoms and to prevent complications. Treatment for this condition may vary depending on how severe your symptoms are. Your health care provider may recommend:  · Changes to your diet.  · Medicine.  · Surgery.  Follow these instructions at home:  Eating and drinking    · Follow a diet as recommended by your health care provider. This may involve avoiding foods and drinks such as:  ? Coffee and tea (with or without caffeine).  ? Drinks that contain alcohol.  ? Energy drinks and sports drinks.  ? Carbonated drinks or sodas.  ? Chocolate and cocoa.  ? Peppermint and mint flavorings.  ? Garlic and onions.  ? Horseradish.  ? Spicy and acidic foods, including peppers, chili powder, marina powder, vinegar, hot sauces, and barbecue sauce.  ? Citrus fruit juices and citrus fruits, such as oranges, sukhdeep, and limes.  ? Tomato-based foods, such as red sauce, chili, salsa, and pizza with red sauce.  ? Fried and fatty foods, such as donuts, french fries, potato chips, and high-fat dressings.  ? High-fat  meats, such as hot dogs and fatty cuts of red and white meats, such as rib eye steak, sausage, ham, and roth.  ? High-fat dairy items, such as whole milk, butter, and cream cheese.  · Eat small, frequent meals instead of large meals.  · Avoid drinking large amounts of liquid with your meals.  · Avoid eating meals during the 2-3 hours before bedtime.  · Avoid lying down right after you eat.  · Do not exercise right after you eat.  Lifestyle    · Do not use any products that contain nicotine or tobacco, such as cigarettes, e-cigarettes, and chewing tobacco. If you need help quitting, ask your health care provider.  · Try to reduce your stress by using methods such as yoga or meditation. If you need help reducing stress, ask your health care provider.  · If you are overweight, reduce your weight to an amount that is healthy for you. Ask your health care provider for guidance about a safe weight loss goal.  General instructions  · Pay attention to any changes in your symptoms.  · Take over-the-counter and prescription medicines only as told by your health care provider. Do not take aspirin, ibuprofen, or other NSAIDs unless your health care provider told you to do so.  · Wear loose-fitting clothing. Do not wear anything tight around your waist that causes pressure on your abdomen.  · Raise (elevate) the head of your bed about 6 inches (15 cm).  · Avoid bending over if this makes your symptoms worse.  · Keep all follow-up visits as told by your health care provider. This is important.  Contact a health care provider if:  · You have:  ? New symptoms.  ? Unexplained weight loss.  ? Difficulty swallowing or it hurts to swallow.  ? Wheezing or a persistent cough.  ? A hoarse voice.  · Your symptoms do not improve with treatment.  Get help right away if you:  · Have pain in your arms, neck, jaw, teeth, or back.  · Feel sweaty, dizzy, or light-headed.  · Have chest pain or shortness of breath.  · Vomit and your vomit looks  like blood or coffee grounds.  · Faint.  · Have stool that is bloody or black.  · Cannot swallow, drink, or eat.  Summary  · Gastroesophageal reflux happens when acid from the stomach flows up into the esophagus. GERD is a disease in which the reflux happens often, causes frequent or severe symptoms, or causes problems such as damage to the esophagus.  · Treatment for this condition may vary depending on how severe your symptoms are. Your health care provider may recommend diet and lifestyle changes, medicine, or surgery.  · Contact a health care provider if you have new or worsening symptoms.  · Take over-the-counter and prescription medicines only as told by your health care provider. Do not take aspirin, ibuprofen, or other NSAIDs unless your health care provider told you to do so.  · Keep all follow-up visits as told by your health care provider. This is important.  This information is not intended to replace advice given to you by your health care provider. Make sure you discuss any questions you have with your health care provider.  Document Released: 09/27/2006 Document Revised: 06/26/2019 Document Reviewed: 06/26/2019  Elsevier Patient Education © 2020 [employer]

## 2020-11-06 ENCOUNTER — OFFICE VISIT (OUTPATIENT)
Dept: OBSTETRICS AND GYNECOLOGY | Facility: CLINIC | Age: 20
End: 2020-11-06

## 2020-11-06 DIAGNOSIS — N92.1 PROLONGED MENSTRUATION: Primary | ICD-10-CM

## 2020-11-06 DIAGNOSIS — Z30.42 SURVEILLANCE FOR DEPO-PROVERA CONTRACEPTION: ICD-10-CM

## 2020-11-06 DIAGNOSIS — Z98.51 S/P TUBAL LIGATION: ICD-10-CM

## 2020-11-06 LAB
B-HCG UR QL: NEGATIVE
INTERNAL NEGATIVE CONTROL: NEGATIVE
INTERNAL POSITIVE CONTROL: POSITIVE
Lab: NORMAL

## 2020-11-06 PROCEDURE — 96372 THER/PROPH/DIAG INJ SC/IM: CPT | Performed by: NURSE PRACTITIONER

## 2020-11-06 PROCEDURE — 81025 URINE PREGNANCY TEST: CPT | Performed by: NURSE PRACTITIONER

## 2020-11-06 PROCEDURE — 99213 OFFICE O/P EST LOW 20 MIN: CPT | Performed by: NURSE PRACTITIONER

## 2020-11-06 RX ADMIN — MEDROXYPROGESTERONE ACETATE 150 MG: 150 INJECTION, SUSPENSION INTRAMUSCULAR at 14:33

## 2020-11-09 VITALS
HEART RATE: 98 BPM | HEIGHT: 65 IN | BODY MASS INDEX: 18.69 KG/M2 | SYSTOLIC BLOOD PRESSURE: 120 MMHG | DIASTOLIC BLOOD PRESSURE: 60 MMHG | WEIGHT: 112.2 LBS

## 2020-11-09 RX ORDER — DOCUSATE SODIUM 100 MG
CAPSULE ORAL
Qty: 60 CAPSULE | Refills: 5 | Status: SHIPPED | OUTPATIENT
Start: 2020-11-09 | End: 2022-02-15

## 2020-11-09 RX ORDER — FAMOTIDINE 40 MG/1
40 TABLET, FILM COATED ORAL NIGHTLY PRN
Qty: 30 TABLET | Refills: 5 | Status: SHIPPED | OUTPATIENT
Start: 2020-11-09 | End: 2021-02-19 | Stop reason: ALTCHOICE

## 2020-11-09 NOTE — PROGRESS NOTES
Subjective   Mandi Walters is a 20 y.o. female.     History of Present Illness   Pt presents with concerns about 3 weeks of vaginal bleeding. Pt was on Depo Provera injections with complete amenorrhea, the last one being 8/7/2020. On 9/14/2020 pt had a tubal ligation with Dr. Taylor. Her next injection would have been due Oct 23rd. She has not been off of Depo Provera in a long time to experience menstruation again. Pt denies pelvic pain. States bleeding has been heavy at first but now is just brown. She is concerned that something is wrong. She denies any surgical complications.     The following portions of the patient's history were reviewed and updated as appropriate: allergies, current medications, past family history, past medical history, past social history, past surgical history and problem list.    Review of Systems   Constitutional: Negative.  Negative for chills, fatigue and fever.   Respiratory: Negative.    Cardiovascular: Negative.    Gastrointestinal: Negative.    Genitourinary: Positive for menstrual problem (prolonged menstrual bleeding). Negative for dysuria, frequency, pelvic pain, urgency, vaginal discharge and vaginal pain.   Neurological: Negative for dizziness, syncope and light-headedness.   Psychiatric/Behavioral: The patient is nervous/anxious.        Objective    Vitals:    11/06/20 1349   BP: 120/60   Pulse: (!) 121         11/06/20  1349   Weight: 50.9 kg (112 lb 3.2 oz)     Body mass index is 18.67 kg/m².    Physical Exam  Vitals signs reviewed. Exam conducted with a chaperone present.   Constitutional:       Appearance: Normal appearance. She is normal weight. She is not ill-appearing or diaphoretic.   Cardiovascular:      Rate and Rhythm: Normal rate and regular rhythm.      Heart sounds: Normal heart sounds.      Comments: Regular rate after she sat down for a while  Pulmonary:      Effort: Pulmonary effort is normal.      Breath sounds: Normal breath sounds.   Abdominal:       General: Abdomen is flat. Bowel sounds are normal. There is no distension.      Palpations: Abdomen is soft.      Tenderness: There is no abdominal tenderness.   Genitourinary:     Labia:         Right: No rash, tenderness, lesion or injury.         Left: No rash, tenderness, lesion or injury.       Vagina: No signs of injury and foreign body. Vaginal discharge (small amount of brown, old blood type discharge.) present. No erythema or tenderness.      Cervix: Normal.      Uterus: Normal.       Adnexa: Right adnexa normal and left adnexa normal.   Skin:     General: Skin is warm and dry.      Coloration: Skin is not pale.   Neurological:      Mental Status: She is alert and oriented to person, place, and time.         UPT negative.     Assessment/Plan   Diagnoses and all orders for this visit:    1. Prolonged menstruation (Primary)    2. S/P tubal ligation    3. Surveillance for Depo-Provera contraception  -     POC Pregnancy, Urine      UPT is negative, pt has not been sexually active since her surgery. I discussed that this is her first few weeks off of Depo Provera in a long time. We discussed that irregular bleeding is very common and nothing to be concerned about unless it is excessive. Her brown spotting today is very mild. We discussed options of continuing to monitor her bleeding pattern and allowing her body to adjust off hormonal measures but pt wants her bleeding to stop ASAP. She really liked Depo Provera and wishes to restart it for menses control. Injection administered by LICHA Delvalle. Pt will RTC as scheduled for future injections. Pt agrees with this plan of care.

## 2020-11-18 ENCOUNTER — OFFICE VISIT (OUTPATIENT)
Dept: GASTROENTEROLOGY | Facility: CLINIC | Age: 20
End: 2020-11-18

## 2020-11-18 VITALS
HEIGHT: 65 IN | BODY MASS INDEX: 18.89 KG/M2 | DIASTOLIC BLOOD PRESSURE: 72 MMHG | WEIGHT: 113.4 LBS | SYSTOLIC BLOOD PRESSURE: 114 MMHG | HEART RATE: 92 BPM

## 2020-11-18 DIAGNOSIS — K21.00 GASTROESOPHAGEAL REFLUX DISEASE WITH ESOPHAGITIS WITHOUT HEMORRHAGE: Primary | ICD-10-CM

## 2020-11-18 DIAGNOSIS — R11.0 NAUSEA: ICD-10-CM

## 2020-11-18 PROCEDURE — 99213 OFFICE O/P EST LOW 20 MIN: CPT | Performed by: NURSE PRACTITIONER

## 2020-11-18 NOTE — PATIENT INSTRUCTIONS
"BMI for Adults  What is BMI?  Body mass index (BMI) is a number that is calculated from a person's weight and height. BMI can help estimate how much of a person's weight is composed of fat. BMI does not measure body fat directly. Rather, it is an alternative to procedures that directly measure body fat, which can be difficult and expensive.  BMI can help identify people who may be at higher risk for certain medical problems.  What are BMI measurements used for?  BMI is used as a screening tool to identify possible weight problems. It helps determine whether a person is obese, overweight, a healthy weight, or underweight.  BMI is useful for:  · Identifying a weight problem that may be related to a medical condition or may increase the risk for medical problems.  · Promoting changes, such as changes in diet and exercise, to help reach a healthy weight. BMI screening can be repeated to see if these changes are working.  How is BMI calculated?  BMI involves measuring your weight in relation to your height. Both height and weight are measured, and the BMI is calculated from those numbers. This can be done either in English ([place] or metric measurements. Note that charts and online BMI calculators are available to help you find your BMI quickly and easily without having to do these calculations yourself.  To calculate your BMI in English ([place] measurements:    1. Measure your weight in pounds (lb).  2. Multiply the number of pounds by 703.  ? For example, for a person who weighs 180 lb, multiply that number by 703, which equals 126,540.  3. Measure your height in inches. Then multiply that number by itself to get a measurement called \"inches squared.\"  ? For example, for a person who is 70 inches tall, the \"inches squared\" measurement is 70 inches x 70 inches, which equals 4,900 inches squared.  4. Divide the total from step 2 (number of lb x 703) by the total from step 3 (inches squared): 126,540 ÷ 4,900 = 25.8. This is " "your BMI.  To calculate your BMI in metric measurements:  1. Measure your weight in kilograms (kg).  2. Measure your height in meters (m). Then multiply that number by itself to get a measurement called \"meters squared.\"  ? For example, for a person who is 1.75 m tall, the \"meters squared\" measurement is 1.75 m x 1.75 m, which is equal to 3.1 meters squared.  3. Divide the number of kilograms (your weight) by the meters squared number. In this example: 70 ÷ 3.1 = 22.6. This is your BMI.  What do the results mean?  BMI charts are used to identify whether you are underweight, normal weight, overweight, or obese. The following guidelines will be used:  · Underweight: BMI less than 18.5.  · Normal weight: BMI between 18.5 and 24.9.  · Overweight: BMI between 25 and 29.9.  · Obese: BMI of 30 or above.  Keep these notes in mind:  · Weight includes both fat and muscle, so someone with a muscular build, such as an athlete, may have a BMI that is higher than 24.9. In cases like these, BMI is not an accurate measure of body fat.  · To determine if excess body fat is the cause of a BMI of 25 or higher, further assessments may need to be done by a health care provider.  · BMI is usually interpreted in the same way for men and women.  Where to find more information  For more information about BMI, including tools to quickly calculate your BMI, go to these websites:  · Centers for Disease Control and Prevention: www.cdc.gov  · American Heart Association: www.heart.org  · National Heart, Lung, and [employer] [place]: www.nhlbi.nih.gov  Summary  · Body mass index (BMI) is a number that is calculated from a person's weight and height.  · BMI may help estimate how much of a person's weight is composed of fat. BMI can help identify those who may be at higher risk for certain medical problems.  · BMI can be measured using English measurements or metric measurements.  · BMI charts are used to identify whether you are underweight, normal " weight, overweight, or obese.  This information is not intended to replace advice given to you by your health care provider. Make sure you discuss any questions you have with your health care provider.  Document Released: 08/29/2005 Document Revised: 09/09/2020 Document Reviewed: 07/17/2020  Elsevier Patient Education © 2020 [employer]

## 2020-11-18 NOTE — PROGRESS NOTES
Chief Complaint   Patient presents with   • Nausea   • Heartburn       Subjective    Mandi Walters is a 20 y.o. female. she is here today for follow-up.    20-year-old female presents to discuss nausea and reflux.  States she has been eating better and really does not have nausea very often and does not have to use her Phenergan on a routine basis.  She canceled gastric empty study reporting that nausea was better.  Her weight is up 1 pound from last office visit.    Nausea  Associated symptoms include abdominal pain and nausea. Pertinent negatives include no arthralgias, chills, diaphoresis, fatigue, fever, sore throat or vomiting.   Heartburn  She complains of abdominal pain, belching, heartburn and nausea. She reports no sore throat. This is a chronic problem. The problem occurs occasionally. The heartburn duration is less than a minute. The heartburn is located in the substernum. The heartburn is of moderate intensity. The heartburn wakes (occ) her from sleep. Pertinent negatives include no fatigue.   Patient has requested extra refills if endocrine and I have asked her why since she is not taking it on a regular basis I gave her 30 pills with 2 refills 1 month ago and she keeps requesting 90 pills than saying she only needs a pill about every few days or once a week.  Discussed with patient she should have adequate supply of medication and does not need to be taking Phenergan around-the-clock.         The following portions of the patient's history were reviewed and updated as appropriate:   Past Medical History:   Diagnosis Date   • Bacterial vaginosis    • Costal chondritis    • Fatigue    • GERD (gastroesophageal reflux disease)    • Hypoglycemia    • Malaise and fatigue    • Menorrhagia    • Well child visit      Past Surgical History:   Procedure Laterality Date   • ENDOSCOPY N/A 7/23/2019    Procedure: ESOPHAGOGASTRODUODENOSCOPY possible dilation;  Surgeon: Guicho Quiñonez MD;  Location: [facility]  ENDOSCOPY;  Service: Gastroenterology   • TUBAL ABDOMINAL LIGATION  2020   • TYMPANOSTOMY TUBE PLACEMENT      Tympanostomy tube-2 (1)      Family History   Problem Relation Age of Onset   • Osteoarthritis Mother    • Diabetes Maternal Grandmother    • Heart disease Maternal Grandmother    • Osteoarthritis Maternal Grandmother    • Coronary artery disease Paternal Grandfather      OB History        1    Para        Term                AB   1    Living           SAB   1    TAB        Ectopic        Molar        Multiple        Live Births                  Prior to Admission medications    Medication Sig Start Date End Date Taking? Authorizing Provider   famotidine (PEPCID) 40 MG tablet TAKE 1 TABLET BY MOUTH AT NIGHT AS NEEDED FOR HEARTBURN 20  Yes Sherrell Bull APRN   medroxyPROGESTERone (DEPO-PROVERA) 150 MG/ML injection INJECT 1 ML INTO THE APPROPRIATE MUSCLE AS DIRECTED BY PRESCRIBER EVERY 3 (THREE) MONTHS. 20  Yes Tena Porras APRN   pantoprazole (PROTONIX) 40 MG EC tablet Take 1 tablet by mouth Daily. 10/21/20  Yes Sherrell Bull APRN   promethazine (PHENERGAN) 25 MG tablet Take 1 tablet by mouth Every 6 (Six) Hours As Needed for Nausea or Vomiting. 10/21/20  Yes Sherrell Bull APRN   Stool Softener 100 MG capsule TAKE 1 CAPSULE BY MOUTH TWICE DAILY AS NEEDED FOR CONSTIPATION 20  Yes Sherrell Bull APRN     Allergies   Allergen Reactions   • Amoxicillin Anaphylaxis     Anaphylaxis   • Loratadine Unknown - High Severity     Unknown   • Penicillins Anaphylaxis   • Codeine Rash     Social History     Socioeconomic History   • Marital status: Single     Spouse name: Not on file   • Number of children: Not on file   • Years of education: Not on file   • Highest education level: Not on file   Tobacco Use   • Smoking status: Current Some Day Smoker     Packs/day: 0.50     Years: 7.00     Pack years: 3.50   • Smokeless tobacco: Never Used   Substance and Sexual Activity   "  • Alcohol use: No   • Drug use: No   • Sexual activity: Yes     Partners: Male     Birth control/protection: Tubal ligation       Review of Systems  Review of Systems   Constitutional: Negative for activity change, appetite change, chills, diaphoresis, fatigue, fever and unexpected weight change.   HENT: Negative for sore throat and trouble swallowing.    Respiratory: Negative for shortness of breath.    Gastrointestinal: Positive for abdominal pain, heartburn and nausea. Negative for abdominal distention, anal bleeding, blood in stool, constipation, diarrhea, rectal pain and vomiting.   Musculoskeletal: Negative for arthralgias.   Skin: Negative for pallor.   Neurological: Negative for light-headedness.        /72   Pulse 92   Ht 165.1 cm (65\")   Wt 51.4 kg (113 lb 6.4 oz)   BMI 18.87 kg/m²     Objective    Physical Exam  Constitutional:       General: She is not in acute distress.     Appearance: Normal appearance. She is well-developed.   HENT:      Head: Normocephalic and atraumatic.   Neck:      Musculoskeletal: Normal range of motion and neck supple.      Thyroid: No thyromegaly.   Cardiovascular:      Rate and Rhythm: Normal rate and regular rhythm.      Heart sounds: Normal heart sounds.   Pulmonary:      Effort: Pulmonary effort is normal.      Breath sounds: Normal breath sounds. No wheezing, rhonchi or rales.   Abdominal:      General: Bowel sounds are normal. There is no distension.      Palpations: Abdomen is soft. Abdomen is not rigid.      Tenderness: There is generalized abdominal tenderness (mild ). There is no guarding.      Hernia: No hernia is present.   Lymphadenopathy:      Cervical: No cervical adenopathy.   Skin:     General: Skin is warm and dry.      Coloration: Skin is not pale.      Findings: No rash.   Neurological:      Mental Status: She is alert and oriented to person, place, and time.   Psychiatric:         Speech: Speech normal.         Behavior: Behavior is cooperative. "       Office Visit on 11/06/2020   Component Date Value Ref Range Status   • HCG, Urine, QL 11/06/2020 Negative  Negative Final   • Lot Number 11/06/2020 [id number]   Final   • Internal Positive Control 11/06/2020 Positive   Final   • Internal Negative Control 11/06/2020 Negative   Final     Assessment/Plan      1. Gastroesophageal reflux disease with esophagitis without hemorrhage    2. Nausea    .   Continue PPI daily discussed importance of only using Phenergan on an as-needed basis.    Orders placed during this encounter include:  No orders of the defined types were placed in this encounter.      * Surgery not found *    Review and/or summary of lab tests, radiology, procedures, medications. Review and summary of old records and obtaining of history. The risks and benefits of my recommendations, as well as other treatment options were discussed with the patient today. Questions were answered.    No orders of the defined types were placed in this encounter.      Follow-up: Return in about 6 months (around 5/18/2021) for Recheck.          This document has been electronically signed by ANH Xiao on November 18, 2020 14:38 CST             Results for orders placed or performed in visit on 11/06/20   POC Pregnancy, Urine    Specimen: Urine   Result Value Ref Range    HCG, Urine, QL Negative Negative    Lot Number [id number]     Internal Positive Control Positive     Internal Negative Control Negative    Results for orders placed or performed in visit on 05/14/20   CBC Auto Differential    Specimen: Blood   Result Value Ref Range    WBC 5.94 3.40 - 10.80 10*3/mm3    RBC 4.98 3.77 - 5.28 10*6/mm3    Hemoglobin 14.4 12.0 - 15.9 g/dL    Hematocrit 45.9 34.0 - 46.6 %    MCV 92.2 79.0 - 97.0 fL    MCH 28.9 26.6 - 33.0 pg    MCHC 31.4 (L) 31.5 - 35.7 g/dL    RDW 14.7 12.3 - 15.4 %    RDW-SD 48.0 37.0 - 54.0 fl    MPV 10.2 6.0 - 12.0 fL    Platelets 250 140 - 450 10*3/mm3    Neutrophil % 51.5 42.7 - 76.0 %     Lymphocyte % 38.6 19.6 - 45.3 %    Monocyte % 7.1 5.0 - 12.0 %    Eosinophil % 2.5 0.3 - 6.2 %    Basophil % 0.3 0.0 - 1.5 %    Neutrophils, Absolute 3.06 1.70 - 7.00 10*3/mm3    Lymphocytes, Absolute 2.29 0.70 - 3.10 10*3/mm3    Monocytes, Absolute 0.42 0.10 - 0.90 10*3/mm3    Eosinophils, Absolute 0.15 0.00 - 0.40 10*3/mm3    Basophils, Absolute 0.02 0.00 - 0.20 10*3/mm3   Vitamin D 25 Hydroxy    Specimen: Blood   Result Value Ref Range    25 Hydroxy, Vitamin D 38.3 30.0 - 100.0 ng/ml   TSH    Specimen: Blood   Result Value Ref Range    TSH 1.520 0.270 - 4.200 uIU/mL   T4, Free    Specimen: Blood   Result Value Ref Range    Free T4 1.37 0.93 - 1.70 ng/dL   Vitamin B12    Specimen: Blood   Result Value Ref Range    Vitamin B-12 901 211 - 946 pg/mL   Lipid Panel    Specimen: Blood   Result Value Ref Range    Total Cholesterol 141 (L) 150 - 200 mg/dL    Triglycerides 97 <=150 mg/dL    HDL Cholesterol 79 (H) 40 - 59 mg/dL    LDL Cholesterol  43 <=100 mg/dL    VLDL Cholesterol 19.4 mg/dL    LDL/HDL Ratio 0.54 0.00 - 3.22   Comprehensive Metabolic Panel    Specimen: Blood   Result Value Ref Range    Glucose 67 (L) 70 - 99 mg/dL    BUN 8 7 - 23 mg/dL    Creatinine 0.82 0.52 - 1.04 mg/dL    Sodium 142 137 - 145 mmol/L    Potassium 4.0 3.4 - 5.0 mmol/L    Chloride 107 101 - 112 mmol/L    CO2 27.0 22.0 - 30.0 mmol/L    Calcium 10.0 8.4 - 10.2 mg/dL    Total Protein 7.3 6.3 - 8.6 g/dL    Albumin 4.50 3.50 - 5.00 g/dL    ALT (SGPT) 10 <=35 U/L    AST (SGOT) 18 14 - 36 U/L    Alkaline Phosphatase 41 38 - 126 U/L    Total Bilirubin 0.4 0.2 - 1.3 mg/dL    eGFR Non  Amer 87 71 - 165 mL/min/1.73    Globulin 2.8 2.3 - 3.5 gm/dL    A/G Ratio 1.6 1.1 - 1.8 g/dL    BUN/Creatinine Ratio 9.8 7.0 - 25.0    Anion Gap 8.0 5.0 - 15.0 mmol/L   Results for orders placed or performed in visit on 10/08/19   Liquid-based Pap Smear, Screening    Specimen: Cervix; ThinPrep Vial   Result Value Ref Range    Case Report       Gynecologic  Cytology Report                       Case: [id number]                                  Authorizing Provider:  Tena Porras    Collected:           10/08/2019 02:00 PM                                 ANH Wynne                                                                  Ordering Location:     [facility]     Received:            10/08/2019 04:01 PM                                 [facility]                                                               First Screen:          Matilda Renee                                                              Pathologist:           Fabio Jacobs MD                                                           Specimen:    Liquid-Based Pap, Screening, Cervix                                                        Interpretation Negative for intraepithelial lesion or malignancy      General Categorization Within normal limits     Other Findings Reactive cellular changes     Specimen Adequacy       Satisfactory for evaluation, endocervical/transformation zone component present    Additional Information       Disclaimer: Cervical cytology is a screening test primarily for squamous cancer and its precursors and has associated false-negative and false-positive results.  Technologies such as liquid-based preparations may decrease but will not eliminate all false-negative results.  Follow-up of unexplained clinical signs and symptoms is recommended to minimize false-negative results. (The [employer] System for Reporting Cervical Cytology: Beth, 2015).     Results for orders placed or performed during the hospital encounter of 07/23/19   Tissue Pathology Exam    Specimen: A: Small Intestine, Duodenum; Tissue    B: Gastric, Antrum; Tissue    C: Esophagus, Distal; Tissue   Result Value Ref Range    Case Report       Surgical Pathology Report                         Case: [id number]                                  Authorizing Provider:  Guicho Quiñonez MD         Collected:           07/23/2019 02:33 PM          Ordering Location:     [facility]             Received:            07/24/2019 06:48 AM                                 [place] [facility]                                                     Pathologist:           Fabio Jacobs MD                                                           Specimens:   1) - Small Intestine, Duodenum, small bowel bx                                                      2) - Gastric, Antrum, antrum bx                                                                     3) - Esophagus, Distal, distal esophagus bx                                                Final Diagnosis       1.  SMALL BOWEL, BIOPSY:  NO SIGNIFICANT HISTOLOGIC ABNORMALITY.    2.  GASTRIC ANTRUM, BIOPSY:  NO SIGNIFICANT HISTOLOGIC ABNORMALITY.    3.  DISTAL ESOPHAGUS, BIOPSY:  BENIGN SQUAMOUS MUCOSA.      Gross Description       1.  Two tan fragments measure 0.8 x 0.5 x 0.2 cm together.  Totally submitted.    2.  Two tan fragments measure 0.5 x 0.5 x 0.2 cm together.  Totally submitted.    3.  Two gray fragments measure 0.8 x 0.7 x 0.1 cm together.  Totally submitted.         Pregnancy, Urine -    Specimen: Urine   Result Value Ref Range    HCG, Urine QL Negative Negative   Results for orders placed or performed in visit on 06/14/19   Chlamydia trachomatis, Neisseria gonorrhoeae, Trichomonas vaginalis, PCR - Urine, Urine, Clean Catch    Specimen: Urine, Clean Catch   Result Value Ref Range    Chlamydia DNA by PCR Negative Negative    Neisseria gonorrhoeae by PCR Negative Negative    Trichomonas vaginalis PCR Negative    Results for orders placed or performed in visit on 05/07/19   Urinalysis With Culture If Indicated - Urine, Clean Catch    Specimen: Urine, Clean Catch   Result Value Ref Range    Color, UA Yellow Yellow, Straw    Appearance, UA Clear Clear    pH, UA 5.5 5.5 - 8.0    Specific Gravity, UA >=1.030 1.005 - 1.030    Glucose, UA Negative  Negative    Ketones, UA Trace (A) Negative    Bilirubin, UA Negative Negative    Blood, UA Negative Negative    Protein, UA Negative Negative    Leuk Esterase, UA Negative Negative    Nitrite, UA Negative Negative    Urobilinogen, UA 1.0 E.U./dL 0.2 - 1.0 E.U./dL   CBC Auto Differential    Specimen: Blood   Result Value Ref Range    WBC 6.71 3.40 - 10.80 10*3/mm3    RBC 4.99 3.77 - 5.28 10*6/mm3    Hemoglobin 14.5 12.0 - 15.9 g/dL    Hematocrit 44.6 34.0 - 46.6 %    MCV 89.4 79.0 - 97.0 fL    MCH 29.1 26.6 - 33.0 pg    MCHC 32.5 31.5 - 35.7 g/dL    RDW 14.4 12.3 - 15.4 %    RDW-SD 45.8 37.0 - 54.0 fl    MPV 10.4 6.0 - 12.0 fL    Platelets 261 140 - 450 10*3/mm3    Neutrophil % 41.6 (L) 42.7 - 76.0 %    Lymphocyte % 49.0 (H) 19.6 - 45.3 %    Monocyte % 7.0 5.0 - 12.0 %    Eosinophil % 2.1 0.3 - 6.2 %    Basophil % 0.3 0.0 - 1.5 %    Neutrophils, Absolute 2.79 1.70 - 7.00 10*3/mm3    Lymphocytes, Absolute 3.29 (H) 0.70 - 3.10 10*3/mm3    Monocytes, Absolute 0.47 0.10 - 0.90 10*3/mm3    Eosinophils, Absolute 0.14 0.00 - 0.40 10*3/mm3    Basophils, Absolute 0.02 0.00 - 0.20 10*3/mm3     *Note: Due to a large number of results and/or encounters for the requested time period, some results have not been displayed. A complete set of results can be found in Results Review.

## 2020-12-01 RX ORDER — PROMETHAZINE HYDROCHLORIDE 25 MG/1
TABLET ORAL
Qty: 30 TABLET | Refills: 2 | OUTPATIENT
Start: 2020-12-01

## 2021-01-05 RX ORDER — PANTOPRAZOLE SODIUM 40 MG/1
40 TABLET, DELAYED RELEASE ORAL DAILY
Qty: 30 TABLET | Refills: 5 | OUTPATIENT
Start: 2021-01-05

## 2021-01-05 RX ORDER — FAMOTIDINE 40 MG/1
40 TABLET, FILM COATED ORAL NIGHTLY PRN
Qty: 30 TABLET | Refills: 5 | OUTPATIENT
Start: 2021-01-05

## 2021-01-05 RX ORDER — PROMETHAZINE HYDROCHLORIDE 25 MG/1
25 TABLET ORAL EVERY 6 HOURS PRN
Qty: 30 TABLET | Refills: 2 | OUTPATIENT
Start: 2021-01-05

## 2021-01-06 RX ORDER — PROMETHAZINE HYDROCHLORIDE 25 MG/1
TABLET ORAL
Qty: 30 TABLET | Refills: 2 | OUTPATIENT
Start: 2021-01-06

## 2021-01-12 RX ORDER — MEDROXYPROGESTERONE ACETATE 150 MG/ML
INJECTION, SUSPENSION INTRAMUSCULAR
Qty: 1 ML | Refills: 1 | OUTPATIENT
Start: 2021-01-12

## 2021-01-22 ENCOUNTER — CLINICAL SUPPORT (OUTPATIENT)
Dept: OBSTETRICS AND GYNECOLOGY | Facility: CLINIC | Age: 21
End: 2021-01-22

## 2021-01-22 VITALS — BODY MASS INDEX: 18.97 KG/M2 | WEIGHT: 114 LBS

## 2021-01-22 DIAGNOSIS — Z30.42 SURVEILLANCE FOR DEPO-PROVERA CONTRACEPTION: ICD-10-CM

## 2021-01-22 PROCEDURE — 96372 THER/PROPH/DIAG INJ SC/IM: CPT | Performed by: NURSE PRACTITIONER

## 2021-01-22 RX ADMIN — MEDROXYPROGESTERONE ACETATE 150 MG: 150 INJECTION, SUSPENSION INTRAMUSCULAR at 10:04

## 2021-02-19 ENCOUNTER — LAB (OUTPATIENT)
Dept: LAB | Facility: OTHER | Age: 21
End: 2021-02-19

## 2021-02-19 ENCOUNTER — OFFICE VISIT (OUTPATIENT)
Dept: OBSTETRICS AND GYNECOLOGY | Facility: CLINIC | Age: 21
End: 2021-02-19

## 2021-02-19 VITALS
WEIGHT: 114.4 LBS | SYSTOLIC BLOOD PRESSURE: 102 MMHG | HEIGHT: 65 IN | DIASTOLIC BLOOD PRESSURE: 64 MMHG | BODY MASS INDEX: 19.06 KG/M2 | HEART RATE: 101 BPM

## 2021-02-19 DIAGNOSIS — B96.89 BV (BACTERIAL VAGINOSIS): ICD-10-CM

## 2021-02-19 DIAGNOSIS — N76.0 BV (BACTERIAL VAGINOSIS): ICD-10-CM

## 2021-02-19 DIAGNOSIS — Z01.419 ENCOUNTER FOR GYNECOLOGICAL EXAMINATION WITH PAPANICOLAOU SMEAR OF CERVIX: Primary | ICD-10-CM

## 2021-02-19 DIAGNOSIS — Z30.42 SURVEILLANCE FOR DEPO-PROVERA CONTRACEPTION: ICD-10-CM

## 2021-02-19 PROBLEM — I73.00 RAYNAUD'S PHENOMENON WITHOUT GANGRENE: Status: ACTIVE | Noted: 2021-01-06

## 2021-02-19 PROBLEM — N18.2 CHRONIC KIDNEY DISEASE, STAGE 2 (MILD): Status: ACTIVE | Noted: 2021-02-08

## 2021-02-19 PROCEDURE — 87210 SMEAR WET MOUNT SALINE/INK: CPT | Performed by: NURSE PRACTITIONER

## 2021-02-19 PROCEDURE — 99395 PREV VISIT EST AGE 18-39: CPT | Performed by: NURSE PRACTITIONER

## 2021-02-19 RX ORDER — FLUCONAZOLE 150 MG/1
TABLET ORAL
Qty: 2 TABLET | Refills: 0 | Status: SHIPPED | OUTPATIENT
Start: 2021-02-19 | End: 2021-05-18

## 2021-02-19 RX ORDER — MEDROXYPROGESTERONE ACETATE 150 MG/ML
150 INJECTION, SUSPENSION INTRAMUSCULAR
Qty: 1 ML | Refills: 3 | Status: SHIPPED | OUTPATIENT
Start: 2021-02-19 | End: 2022-01-18

## 2021-02-19 RX ORDER — METRONIDAZOLE 500 MG/1
500 TABLET ORAL 2 TIMES DAILY
Qty: 14 TABLET | Refills: 0 | Status: SHIPPED | OUTPATIENT
Start: 2021-02-19 | End: 2021-02-26

## 2021-02-19 NOTE — PROGRESS NOTES
"Subjective   Chief Complaint   Patient presents with   • Gynecologic Exam     WWE   • Contraception     depo provera refills     Mandi Walters is a 21 y.o. year old  presenting to be seen for her annual exam.  Today she has concerns about vaginal discharge with odor \"for a while\" but cannot tell me a timeframe. She notes some burning of the skin when she urinates but denies urinary specific symptoms. Denies vaginal itching. States douching causes her discomfort but she continues to douche. Has some pelvic cramping and sharp pain that occurs approximately 1x a month. She still has some BTB on Depo Provera but has a hard time describing timing. At one point states it is when her next injection is due but then later says she bleeding 1-2 months at a time.  She had her last injection on  and scheduled for 2021.    Patient's last menstrual period was 2021 (approximate).    OB History        1    Para        Term                AB   1    Living           SAB   1    TAB        Ectopic        Molar        Multiple        Live Births                    Current birth control method: Depo-Provera injections.    She is sexually active.  In the past 12 months there has not been new sexual partners.  Condoms are not typically used.  She would not like to be screened for STD's at today's exam. Negative G/C/T 2019    Past 6 month menstrual history:    Cycle Frequency: irregular  infrequent   Menstrual cycle character: flow has been absent and flow is typically light   Cycle Duration: 2-60 days poor historian   Number of heavy days of flows: 0   Dysmenorrhea: is not affecting her activities of daily living   PMS: is not affecting her activities of daily living   Intermenstrual bleeding present: yes   Post-coital bleeding present: no     She exercises regularly: yes.  She wears her seat belt:yes.  She has concerns about domestic violence: no.  Last colonoscopy: Never  Last DEXA: " "Never  Last MMG: Never  Last pap: 10/8/19  History of abnormal PAP: No    The following portions of the patient's history were reviewed and updated as appropriate:problem list, current medications, allergies, past family history, past medical history, past social history and past surgical history.    Social History    Tobacco Use      Smoking status: Current Some Day Smoker        Packs/day: 0.25        Years: 7.00        Pack years: 1.75      Smokeless tobacco: Never Used   States she is not ready to quit but has cut back 0.25ppd    Social History     Substance and Sexual Activity   Alcohol Use No      Review of Systems   Constitutional: Negative.  Negative for chills and fever.   Respiratory: Negative.    Cardiovascular: Negative.    Gastrointestinal: Negative.  Negative for abdominal pain, constipation (relieved by daily colace), diarrhea, nausea and vomiting.        GERD   Endocrine: Negative.    Genitourinary: Positive for vaginal discharge (with odor). Negative for difficulty urinating, dysuria, menstrual problem, pelvic pain, urgency, vaginal bleeding (BTB ) and vaginal pain.        Occasional dryness with intercourse    Skin: Negative.    Neurological: Negative for dizziness, syncope, light-headedness and headaches.   Psychiatric/Behavioral: Negative for suicidal ideas. The patient is not nervous/anxious.          Objective   /64   Pulse 101   Ht 165.1 cm (65\")   Wt 51.9 kg (114 lb 6.4 oz)   LMP 01/22/2021 (Approximate) Comment: is on depo provera, usually has bleeding before next injection  Breastfeeding No   BMI 19.04 kg/m²     General:  well developed; well nourished  no acute distress   Skin:  No suspicious lesions seen   Thyroid: not examined   Breasts:  Examined in supine position  Symmetric without masses or skin dimpling  Nipples normal without inversion, lesions or discharge  There are no palpable axillary nodes  Fibrocystic changes are present both breasts without a discrete mass "   Abdomen: no umbilical or inguinal hernias are present  no hepato-splenomegaly  Normal findings: bowel sounds normal  Abnormal findings: mild tenderness generalized   Cardiac: Heart sounds are normal.  Regular rate and rhythm without murmur, gallop or rub.   Resp: Normal expansion.  Clear to auscultation.  No rales, rhonchi, or wheezing.   Psych: alert,oriented, in NAD with a full range of affect, normal behavior and no psychotic features   Pelvis: Uterus:  Clinical staff was present for exam  External genitalia:  normal appearance of the external genitalia including Bartholin's and Eastlake's glands.  :  urethral meatus normal;  Vaginal:  normal pink mucosa without prolapse or lesions, discharge present -  white and thin and wet prep done: finding =  clue cells are present, pseudo-hyphae are absent and trichomonads are absent  Cervix:  normal appearance.  Uterus:  normal size, shape and consistency  Adnexa:  normal bimanual exam of the adnexa.     Lab Review   No data reviewed    Imaging  No data reviewed       Diagnoses and all orders for this visit:    1. Encounter for gynecological examination with Papanicolaou smear of cervix (Primary)  -     Liquid-based Pap Smear, Screening    2. Surveillance for Depo-Provera contraception  -     medroxyPROGESTERone (DEPO-PROVERA) 150 MG/ML injection; Inject 1 mL into the appropriate muscle as directed by prescriber Every 3 (Three) Months.  Dispense: 1 mL; Refill: 3    3. BV (bacterial vaginosis)  -     metroNIDAZOLE (FLAGYL) 500 MG tablet; Take 1 tablet by mouth 2 (Two) Times a Day for 7 days. No alcohol up to 48 hours after last dose  Dispense: 14 tablet; Refill: 0  -     fluconazole (DIFLUCAN) 150 MG tablet; Take 1 tablet PO on day 3 and day 7 of antibiotics  Dispense: 2 tablet; Refill: 0    Pap results: I will send card in mail or call if abnormal. RTC annually for well woman exams.     Discussed BTB is common. Given the difficulty with timeline, I encouraged her to write  it down on a calendar so we could see if there was a pattern to it. She wishes to remain on Depo Provera right now.     Discussed findings on exam and wet prep consistent with BV. Treat with Flagyl 500mg BID x 7 days. No alcohol was stressed. Take Diflucan on day 3 and 7 to prevent a secondary candida infection. Vulvar hygiene guidelines reviewed. Pt encouraged to stop douching. Use Dove unscented bar soap instead of mens, scented shower gels. Encouraged showers instead of baths but pt states she doesn't have a shower at her home, only a tub. RTC if symptoms persist, worsen or recur. Pt voices understanding and agrees with this plan.     I encouraged her to try coconut oil for lubrication.     This note was electronically signed.    Tena Porras, APRN  February 19, 2021

## 2021-02-22 LAB
LAB AP CASE REPORT: NORMAL
PATH INTERP SPEC-IMP: NORMAL

## 2021-03-02 RX ORDER — PANTOPRAZOLE SODIUM 40 MG/1
40 TABLET, DELAYED RELEASE ORAL DAILY
Qty: 30 TABLET | Refills: 5 | Status: SHIPPED | OUTPATIENT
Start: 2021-03-02 | End: 2021-05-18 | Stop reason: SDUPTHER

## 2021-03-02 RX ORDER — PROMETHAZINE HYDROCHLORIDE 25 MG/1
TABLET ORAL
Qty: 30 TABLET | Refills: 2 | Status: SHIPPED | OUTPATIENT
Start: 2021-03-02 | End: 2021-05-18 | Stop reason: SDUPTHER

## 2021-03-22 RX ORDER — PROMETHAZINE HYDROCHLORIDE 25 MG/1
TABLET ORAL
Qty: 30 TABLET | Refills: 2 | OUTPATIENT
Start: 2021-03-22

## 2021-03-22 NOTE — TELEPHONE ENCOUNTER
Sent in enough at last visit to last until next visit. Please have her see Dr. Quiñonez or Dr. Gaona if nausea is still severe.

## 2021-04-06 ENCOUNTER — CLINICAL SUPPORT (OUTPATIENT)
Dept: OBSTETRICS AND GYNECOLOGY | Facility: CLINIC | Age: 21
End: 2021-04-06

## 2021-04-06 VITALS — WEIGHT: 115 LBS | BODY MASS INDEX: 19.14 KG/M2

## 2021-04-06 DIAGNOSIS — Z30.42 SURVEILLANCE FOR DEPO-PROVERA CONTRACEPTION: ICD-10-CM

## 2021-04-06 PROCEDURE — 96372 THER/PROPH/DIAG INJ SC/IM: CPT | Performed by: NURSE PRACTITIONER

## 2021-04-06 RX ORDER — FAMOTIDINE 40 MG/1
TABLET, FILM COATED ORAL
COMMUNITY
Start: 2021-04-01 | End: 2021-05-18

## 2021-04-06 RX ADMIN — MEDROXYPROGESTERONE ACETATE 150 MG: 150 INJECTION, SUSPENSION INTRAMUSCULAR at 14:28

## 2021-05-11 RX ORDER — DOCUSATE SODIUM 100 MG/1
100 CAPSULE, LIQUID FILLED ORAL 2 TIMES DAILY PRN
Qty: 60 CAPSULE | Refills: 5 | OUTPATIENT
Start: 2021-05-11

## 2021-05-11 RX ORDER — PROMETHAZINE HYDROCHLORIDE 25 MG/1
25 TABLET ORAL EVERY 6 HOURS PRN
Qty: 30 TABLET | Refills: 2 | OUTPATIENT
Start: 2021-05-11

## 2021-05-18 ENCOUNTER — OFFICE VISIT (OUTPATIENT)
Dept: GASTROENTEROLOGY | Facility: CLINIC | Age: 21
End: 2021-05-18

## 2021-05-18 VITALS
BODY MASS INDEX: 19.33 KG/M2 | SYSTOLIC BLOOD PRESSURE: 122 MMHG | WEIGHT: 116 LBS | HEART RATE: 88 BPM | DIASTOLIC BLOOD PRESSURE: 77 MMHG | HEIGHT: 65 IN

## 2021-05-18 DIAGNOSIS — K21.00 GASTROESOPHAGEAL REFLUX DISEASE WITH ESOPHAGITIS WITHOUT HEMORRHAGE: ICD-10-CM

## 2021-05-18 DIAGNOSIS — R11.0 NAUSEA: Primary | ICD-10-CM

## 2021-05-18 PROCEDURE — 99213 OFFICE O/P EST LOW 20 MIN: CPT | Performed by: NURSE PRACTITIONER

## 2021-05-18 RX ORDER — DICYCLOMINE HYDROCHLORIDE 10 MG/1
10 CAPSULE ORAL
Qty: 30 CAPSULE | Refills: 1 | Status: SHIPPED | OUTPATIENT
Start: 2021-05-18 | End: 2021-08-18

## 2021-05-18 RX ORDER — PROMETHAZINE HYDROCHLORIDE 25 MG/1
25 TABLET ORAL EVERY 6 HOURS PRN
Qty: 30 TABLET | Refills: 2 | Status: SHIPPED | OUTPATIENT
Start: 2021-05-18 | End: 2021-08-18 | Stop reason: SDUPTHER

## 2021-05-18 RX ORDER — PANTOPRAZOLE SODIUM 40 MG/1
40 TABLET, DELAYED RELEASE ORAL DAILY
Qty: 30 TABLET | Refills: 5 | Status: SHIPPED | OUTPATIENT
Start: 2021-05-18 | End: 2021-08-18 | Stop reason: SDUPTHER

## 2021-05-18 NOTE — PATIENT INSTRUCTIONS
Nausea, Adult  Nausea is the feeling that you have an upset stomach or that you are about to vomit. Nausea on its own is not usually a serious concern, but it may be an early sign of a more serious medical problem. As nausea gets worse, it can lead to vomiting. If vomiting develops, or if you are not able to drink enough fluids, you are at risk of becoming dehydrated. Dehydration can make you tired and thirsty, cause you to have a dry mouth, and decrease how often you urinate. Older adults and people with other diseases or a weak disease-fighting system (immune system) are at higher risk for dehydration. The main goals of treating your nausea are:  · To relieve your nausea.  · To limit repeated nausea episodes.  · To prevent vomiting and dehydration.  Follow these instructions at home:  Watch your symptoms for any changes. Tell your health care provider about them. Follow these instructions as told by your health care provider.  Eating and drinking         · Take an oral rehydration solution (ORS). This is a drink that is sold at pharmacies and retail stores.  · Drink clear fluids slowly and in small amounts as you are able. Clear fluids include water, ice chips, low-calorie sports drinks, and fruit juice that has water added (diluted fruit juice).  · Eat bland, easy-to-digest foods in small amounts as you are able. These foods include bananas, applesauce, rice, lean meats, toast, and crackers.  · Avoid drinking fluids that contain a lot of sugar or caffeine, such as energy drinks, sports drinks, and soda.  · Avoid alcohol.  · Avoid spicy or fatty foods.  General instructions  · Take over-the-counter and prescription medicines only as told by your health care provider.  · Rest at home while you recover.  · Drink enough fluid to keep your urine pale yellow.  · Breathe slowly and deeply when you feel nauseous.  · Avoid smelling things that have strong odors.  · Wash your hands often using soap and water. If soap and  water are not available, use hand .  · Make sure that all people in your household wash their hands well and often.  · Keep all follow-up visits as told by your health care provider. This is important.  Contact a health care provider if:  · Your nausea gets worse.  · Your nausea does not go away after two days.  · You vomit.  · You cannot drink fluids without vomiting.  · You have any of the following:  ? New symptoms.  ? A fever.  ? A headache.  ? Muscle cramps.  ? A rash.  ? Pain while urinating.  · You feel light-headed or dizzy.  Get help right away if:  · You have pain in your chest, neck, arm, or jaw.  · You feel extremely weak or you faint.  · You have vomit that is bright red or looks like coffee grounds.  · You have bloody or black stools or stools that look like tar.  · You have a severe headache, a stiff neck, or both.  · You have severe pain, cramping, or bloating in your abdomen.  · You have difficulty breathing or are breathing very quickly.  · Your heart is beating very quickly.  · Your skin feels cold and clammy.  · You feel confused.  · You have signs of dehydration, such as:  ? Dark urine, very little urine, or no urine.  ? Cracked lips.  ? Dry mouth.  ? Sunken eyes.  ? Sleepiness.  ? Weakness.  These symptoms may represent a serious problem that is an emergency. Do not wait to see if the symptoms will go away. Get medical help right away. Call your local emergency services (911 in the [place] Do not drive yourself to the hospital.  Summary  · Nausea is the feeling that you have an upset stomach or that you are about to vomit. Nausea on its own is not usually a serious concern, but it may be an early sign of a more serious medical problem.  · If vomiting develops, or if you are not able to drink enough fluids, you are at risk of becoming dehydrated.  · Follow recommendations for eating and drinking and take over-the-counter and prescription medicines only as told by your health care  provider.  · Contact a health care provider right away if your symptoms worsen or you have new symptoms.  · Keep all follow-up visits as told by your health care provider. This is important.  This information is not intended to replace advice given to you by your health care provider. Make sure you discuss any questions you have with your health care provider.  Document Revised: 11/17/2020 Document Reviewed: 05/28/2019  Elsevier Patient Education © 2021 [employer]

## 2021-05-18 NOTE — PROGRESS NOTES
Chief Complaint   Patient presents with   • Nausea   • Heartburn       Subjective    Mandi Walters is a 21 y.o. female. she is here today for follow-up.    21-year-old female presents for follow-up regarding reflux and nausea.  We tried to add Pepcid which she states did not help states she needs promethazine to be able to eat if she does not take it states she cannot eat reports her normal intake is eating at around 11 AM and 1:59 PM and then not eating the rest of the night because her belly hurts.  Try to schedule her for gastric empty study but she states she cannot do that test because they want her to eat too early in the morning and she cannot.  Her EGD noted esophagitis and gastritis.  States she never vomits but occasionally has episodes of diarrhea.  She frequently calls for refills of promethazine although there are refills on the bottle but states the pharmacy automatically refilled so she runs out early.  She reports she still has some pills at home but is concerned she will go to [place] next month and not sure when she will return wants to make sure she has refills of promethazine prior to her trip.    Nausea  This is a chronic problem. The problem occurs intermittently. The problem has been waxing and waning. Associated symptoms include nausea. Pertinent negatives include no abdominal pain, anorexia, arthralgias, change in bowel habit, chest pain, chills, congestion, coughing, diaphoresis, fatigue, fever or vomiting.   Heartburn  She complains of nausea. She reports no abdominal pain, no chest pain or no coughing. Pertinent negatives include no fatigue.            The following portions of the patient's history were reviewed and updated as appropriate:   Past Medical History:   Diagnosis Date   • Bacterial vaginosis    • Costal chondritis    • Fatigue    • GERD (gastroesophageal reflux disease)    • Hypoglycemia    • Malaise and fatigue    • Menorrhagia    • Well child visit      Past  Surgical History:   Procedure Laterality Date   • ENDOSCOPY N/A 2019    Procedure: ESOPHAGOGASTRODUODENOSCOPY possible dilation;  Surgeon: Guicho Quiñonez MD;  Location: [facility] ENDOSCOPY;  Service: Gastroenterology   • TUBAL ABDOMINAL LIGATION  2020   • TYMPANOSTOMY TUBE PLACEMENT      Tympanostomy tube-2 (1)      Family History   Problem Relation Age of Onset   • Osteoarthritis Mother    • Diabetes Maternal Grandmother    • Heart disease Maternal Grandmother    • Osteoarthritis Maternal Grandmother    • Coronary artery disease Paternal Grandfather      OB History        1    Para        Term                AB   1    Living           SAB   1    TAB        Ectopic        Molar        Multiple        Live Births                  Prior to Admission medications    Medication Sig Start Date End Date Taking? Authorizing Provider   famotidine (PEPCID) 40 MG tablet  21  Yes Provider, MD Fam   medroxyPROGESTERone (DEPO-PROVERA) 150 MG/ML injection Inject 1 mL into the appropriate muscle as directed by prescriber Every 3 (Three) Months. 21  Yes Tena Porras APRN   pantoprazole (PROTONIX) 40 MG EC tablet TAKE 1 TABLET BY MOUTH DAILY 3/2/21  Yes Sherrell Bull APRN   promethazine (PHENERGAN) 25 MG tablet TAKE 1 TABLET BY MOUTH EVERY 6 HOURS AS NEEDED FOR NAUSEA OR VOMITING 3/2/21  Yes Sherrell Bull APRN   Stool Softener 100 MG capsule TAKE 1 CAPSULE BY MOUTH TWICE DAILY AS NEEDED FOR CONSTIPATION 20  Yes Sherrell Bull APRN   fluconazole (DIFLUCAN) 150 MG tablet Take 1 tablet PO on day 3 and day 7 of antibiotics 21   Tena Porras APRN     Allergies   Allergen Reactions   • Amoxicillin Anaphylaxis     Anaphylaxis   • Loratadine Unknown - High Severity     Unknown   • Penicillins Anaphylaxis   • Codeine Rash     Social History     Socioeconomic History   • Marital status: Single     Spouse name: Not on file   • Number of children: Not on file    "  • Years of education: Not on file   • Highest education level: Not on file   Tobacco Use   • Smoking status: Current Some Day Smoker     Packs/day: 0.25     Years: 7.00     Pack years: 1.75   • Smokeless tobacco: Never Used   Substance and Sexual Activity   • Alcohol use: No   • Drug use: No   • Sexual activity: Yes     Partners: Male     Birth control/protection: Tubal ligation, Injection       Review of Systems  Review of Systems   Constitutional: Negative for activity change, appetite change, chills, diaphoresis, fatigue, fever and unexpected weight change.   HENT: Negative for congestion.    Respiratory: Negative for cough.    Cardiovascular: Negative for chest pain.   Gastrointestinal: Positive for nausea. Negative for abdominal distention, abdominal pain, anal bleeding, anorexia, blood in stool, change in bowel habit, constipation, diarrhea, rectal pain and vomiting.   Musculoskeletal: Negative for arthralgias.        /77 (BP Location: Left arm)   Pulse 88   Ht 165.1 cm (65\")   Wt 52.6 kg (116 lb)   BMI 19.30 kg/m²     Objective    Physical Exam  Constitutional:       General: She is not in acute distress.     Appearance: Normal appearance. She is well-developed.   HENT:      Head: Normocephalic and atraumatic.   Neck:      Thyroid: No thyromegaly.   Pulmonary:      Effort: Pulmonary effort is normal.   Abdominal:      General: Bowel sounds are normal. There is no distension.      Palpations: Abdomen is soft. Abdomen is not rigid. There is no fluid wave.      Tenderness: There is abdominal tenderness in the epigastric area and left upper quadrant. There is no guarding.      Hernia: No hernia is present.   Musculoskeletal:      Cervical back: Normal range of motion and neck supple.   Skin:     General: Skin is warm and dry.      Coloration: Skin is not pale.      Findings: No rash.   Neurological:      Mental Status: She is alert and oriented to person, place, and time.   Psychiatric:         " Speech: Speech normal.         Behavior: Behavior is cooperative.       Office Visit on 02/19/2021   Component Date Value Ref Range Status   • Case Report 02/19/2021    Final                    Value:Gynecologic Cytology Report                       Case: [id number]                                  Authorizing Provider:  Tena Porras    Collected:           02/19/2021 10:06 AM                                 ANH Wynne                                                                  Ordering Location:     [facility]     Received:            02/19/2021 10:24 AM                                 [facility]                                                                 First Screen:          Nasra Kelley                                                               Specimen:    Sendout to [facility], Cervix                                                                    • Interpretation 02/19/2021 See attached report   Final     Assessment/Plan      1. Nausea    2. Gastroesophageal reflux disease with esophagitis without hemorrhage    .   Continue Protonix daily discussed with patient it is important to use promethazine sparingly should not be used on a daily basis only as needed for severe nausea try to wean herself down from daily use.  Recommend gastric empty study patient declines to schedule.   we will add dicyclomine before meals and bedtime try to increase meals to 3-4 times per day.  Follow-up in 3 months for recheck return office sooner if needed    Orders placed during this encounter include:  No orders of the defined types were placed in this encounter.      * Surgery not found *    Review and/or summary of lab tests, radiology, procedures, medications. Review and summary of old records and obtaining of history. The risks and benefits of my recommendations, as well as other treatment options were discussed with the patient today. Questions were answered.    New Medications Ordered This  Visit   Medications   • dicyclomine (Bentyl) 10 MG capsule     Sig: Take 1 capsule by mouth 4 (Four) Times a Day Before Meals & at Bedtime.     Dispense:  30 capsule     Refill:  1   • pantoprazole (PROTONIX) 40 MG EC tablet     Sig: Take 1 tablet by mouth Daily.     Dispense:  30 tablet     Refill:  5   • promethazine (PHENERGAN) 25 MG tablet     Sig: Take 1 tablet by mouth Every 6 (Six) Hours As Needed for Nausea or Vomiting.     Dispense:  30 tablet     Refill:  2       Follow-up: Return in about 3 months (around 8/18/2021) for Recheck.          This document has been electronically signed by ANH Xiao on May 18, 2021 10:11 CDT           I spent 15 minutes caring for Mandi on this date of service. This time includes time spent by me in the following activities:preparing for the visit, reviewing tests, obtaining and/or reviewing a separately obtained history, performing a medically appropriate examination and/or evaluation , counseling and educating the patient/family/caregiver, ordering medications, tests, or procedures, referring and communicating with other health care professionals , documenting information in the medical record and care coordination    Results for orders placed or performed in visit on 02/19/21   Liquid-based Pap Smear, Screening    Specimen: Cervix; ThinPrep Vial   Result Value Ref Range    Case Report       Gynecologic Cytology Report                       Case: [id number]                                  Authorizing Provider:  Tena Porras    Collected:           02/19/2021 10:06 AM                                 ANH Wynne                                                                  Ordering Location[facility]     [facility]     Received:            02/19/2021 10:24 AM                                 [facility]                                                                 First Screen:          Nasra Kelley                                                                Specimen:    Sendout to [facility], Cervix                                                                     Interpretation See attached report    Results for orders placed or performed in visit on 11/06/20   POC Pregnancy, Urine    Specimen: Urine   Result Value Ref Range    HCG, Urine, QL Negative Negative    Lot Number [id number]     Internal Positive Control Positive     Internal Negative Control Negative    Results for orders placed or performed in visit on 05/14/20   CBC Auto Differential    Specimen: Blood   Result Value Ref Range    WBC 5.94 3.40 - 10.80 10*3/mm3    RBC 4.98 3.77 - 5.28 10*6/mm3    Hemoglobin 14.4 12.0 - 15.9 g/dL    Hematocrit 45.9 34.0 - 46.6 %    MCV 92.2 79.0 - 97.0 fL    MCH 28.9 26.6 - 33.0 pg    MCHC 31.4 (L) 31.5 - 35.7 g/dL    RDW 14.7 12.3 - 15.4 %    RDW-SD 48.0 37.0 - 54.0 fl    MPV 10.2 6.0 - 12.0 fL    Platelets 250 140 - 450 10*3/mm3    Neutrophil % 51.5 42.7 - 76.0 %    Lymphocyte % 38.6 19.6 - 45.3 %    Monocyte % 7.1 5.0 - 12.0 %    Eosinophil % 2.5 0.3 - 6.2 %    Basophil % 0.3 0.0 - 1.5 %    Neutrophils, Absolute 3.06 1.70 - 7.00 10*3/mm3    Lymphocytes, Absolute 2.29 0.70 - 3.10 10*3/mm3    Monocytes, Absolute 0.42 0.10 - 0.90 10*3/mm3    Eosinophils, Absolute 0.15 0.00 - 0.40 10*3/mm3    Basophils, Absolute 0.02 0.00 - 0.20 10*3/mm3   Vitamin D 25 Hydroxy    Specimen: Blood   Result Value Ref Range    25 Hydroxy, Vitamin D 38.3 30.0 - 100.0 ng/ml   TSH    Specimen: Blood   Result Value Ref Range    TSH 1.520 0.270 - 4.200 uIU/mL   T4, Free    Specimen: Blood   Result Value Ref Range    Free T4 1.37 0.93 - 1.70 ng/dL   Vitamin B12    Specimen: Blood   Result Value Ref Range    Vitamin B-12 901 211 - 946 pg/mL   Lipid Panel    Specimen: Blood   Result Value Ref Range    Total Cholesterol 141 (L) 150 - 200 mg/dL    Triglycerides 97 <=150 mg/dL    HDL Cholesterol 79 (H) 40 - 59 mg/dL    LDL Cholesterol  43 <=100 mg/dL    VLDL Cholesterol 19.4 mg/dL    LDL/HDL  Ratio 0.54 0.00 - 3.22   Comprehensive Metabolic Panel    Specimen: Blood   Result Value Ref Range    Glucose 67 (L) 70 - 99 mg/dL    BUN 8 7 - 23 mg/dL    Creatinine 0.82 0.52 - 1.04 mg/dL    Sodium 142 137 - 145 mmol/L    Potassium 4.0 3.4 - 5.0 mmol/L    Chloride 107 101 - 112 mmol/L    CO2 27.0 22.0 - 30.0 mmol/L    Calcium 10.0 8.4 - 10.2 mg/dL    Total Protein 7.3 6.3 - 8.6 g/dL    Albumin 4.50 3.50 - 5.00 g/dL    ALT (SGPT) 10 <=35 U/L    AST (SGOT) 18 14 - 36 U/L    Alkaline Phosphatase 41 38 - 126 U/L    Total Bilirubin 0.4 0.2 - 1.3 mg/dL    eGFR Non  Amer 87 71 - 165 mL/min/1.73    Globulin 2.8 2.3 - 3.5 gm/dL    A/G Ratio 1.6 1.1 - 1.8 g/dL    BUN/Creatinine Ratio 9.8 7.0 - 25.0    Anion Gap 8.0 5.0 - 15.0 mmol/L   Results for orders placed or performed in visit on 10/08/19   Liquid-based Pap Smear, Screening    Specimen: Cervix; ThinPrep Vial   Result Value Ref Range    Case Report       Gynecologic Cytology Report                       Case: [id number]                                  Authorizing Provider:  Tena Porras    Collected:           10/08/2019 02:00 PM                                 ANH Wynne                                                                  Ordering Location:     [facility]     Received:            10/08/2019 04:01 PM                                 [facility]                                                               First Screen:          Matilda Renee                                                              Pathologist:           Fabio Jacobs MD                                                           Specimen:    Liquid-Based Pap, Screening, Cervix                                                        Interpretation Negative for intraepithelial lesion or malignancy      General Categorization Within normal limits     Other Findings Reactive cellular changes     Specimen Adequacy       Satisfactory for evaluation,  endocervical/transformation zone component present    Additional Information       Disclaimer: Cervical cytology is a screening test primarily for squamous cancer and its precursors and has associated false-negative and false-positive results.  Technologies such as liquid-based preparations may decrease but will not eliminate all false-negative results.  Follow-up of unexplained clinical signs and symptoms is recommended to minimize false-negative results. (The [place] System for Reporting Cervical Cytology: Beth, 2015).     Results for orders placed or performed during the hospital encounter of 07/23/19   Tissue Pathology Exam    Specimen: A: Small Intestine, Duodenum; Tissue    B: Gastric, Antrum; Tissue    C: Esophagus, Distal; Tissue   Result Value Ref Range    Case Report       Surgical Pathology Report                         Case: [id number]                                  Authorizing Provider:  Guicho Quiñonez MD        Collected:           07/23/2019 02:33 PM          Ordering Location:     [facility]             Received:            07/24/2019 06:48 AM                                 [facility]                                                     Pathologist:           Fabio Jacobs MD                                                           Specimens:   1) - Small Intestine, Duodenum, small bowel bx                                                      2) - Gastric, Antrum, antrum bx                                                                     3) - Esophagus, Distal, distal esophagus bx                                                Final Diagnosis       1.  SMALL BOWEL, BIOPSY:  NO SIGNIFICANT HISTOLOGIC ABNORMALITY.    2.  GASTRIC ANTRUM, BIOPSY:  NO SIGNIFICANT HISTOLOGIC ABNORMALITY.    3.  DISTAL ESOPHAGUS, BIOPSY:  BENIGN SQUAMOUS MUCOSA.      Gross Description       1.  Two tan fragments measure 0.8 x 0.5 x 0.2 cm together.  Totally submitted.    2.  Two tan  fragments measure 0.5 x 0.5 x 0.2 cm together.  Totally submitted.    3.  Two gray fragments measure 0.8 x 0.7 x 0.1 cm together.  Totally submitted.         Pregnancy, Urine -    Specimen: Urine   Result Value Ref Range    HCG, Urine QL Negative Negative   Results for orders placed or performed in visit on 06/14/19   Chlamydia trachomatis, Neisseria gonorrhoeae, Trichomonas vaginalis, PCR - Urine, Urine, Clean Catch    Specimen: Urine, Clean Catch   Result Value Ref Range    Chlamydia DNA by PCR Negative Negative    Neisseria gonorrhoeae by PCR Negative Negative    Trichomonas vaginalis PCR Negative    Results for orders placed or performed in visit on 05/07/19   Urinalysis With Culture If Indicated - Urine, Clean Catch    Specimen: Urine, Clean Catch   Result Value Ref Range    Color, UA Yellow Yellow, Straw    Appearance, UA Clear Clear    pH, UA 5.5 5.5 - 8.0    Specific Gravity, UA >=1.030 1.005 - 1.030    Glucose, UA Negative Negative    Ketones, UA Trace (A) Negative    Bilirubin, UA Negative Negative    Blood, UA Negative Negative    Protein, UA Negative Negative    Leuk Esterase, UA Negative Negative    Nitrite, UA Negative Negative    Urobilinogen, UA 1.0 E.U./dL 0.2 - 1.0 E.U./dL   CBC Auto Differential    Specimen: Blood   Result Value Ref Range    WBC 6.71 3.40 - 10.80 10*3/mm3    RBC 4.99 3.77 - 5.28 10*6/mm3    Hemoglobin 14.5 12.0 - 15.9 g/dL    Hematocrit 44.6 34.0 - 46.6 %    MCV 89.4 79.0 - 97.0 fL    MCH 29.1 26.6 - 33.0 pg    MCHC 32.5 31.5 - 35.7 g/dL    RDW 14.4 12.3 - 15.4 %    RDW-SD 45.8 37.0 - 54.0 fl    MPV 10.4 6.0 - 12.0 fL    Platelets 261 140 - 450 10*3/mm3    Neutrophil % 41.6 (L) 42.7 - 76.0 %    Lymphocyte % 49.0 (H) 19.6 - 45.3 %    Monocyte % 7.0 5.0 - 12.0 %    Eosinophil % 2.1 0.3 - 6.2 %    Basophil % 0.3 0.0 - 1.5 %    Neutrophils, Absolute 2.79 1.70 - 7.00 10*3/mm3    Lymphocytes, Absolute 3.29 (H) 0.70 - 3.10 10*3/mm3    Monocytes, Absolute 0.47 0.10 - 0.90 10*3/mm3     Eosinophils, Absolute 0.14 0.00 - 0.40 10*3/mm3    Basophils, Absolute 0.02 0.00 - 0.20 10*3/mm3     *Note: Due to a large number of results and/or encounters for the requested time period, some results have not been displayed. A complete set of results can be found in Results Review.

## 2021-06-01 RX ORDER — PROMETHAZINE HYDROCHLORIDE 25 MG/1
TABLET ORAL
Qty: 30 TABLET | Refills: 2 | OUTPATIENT
Start: 2021-06-01

## 2021-06-14 ENCOUNTER — CLINICAL SUPPORT (OUTPATIENT)
Dept: OBSTETRICS AND GYNECOLOGY | Facility: CLINIC | Age: 21
End: 2021-06-14

## 2021-06-14 VITALS — WEIGHT: 118 LBS | BODY MASS INDEX: 19.64 KG/M2

## 2021-06-14 DIAGNOSIS — Z30.42 SURVEILLANCE FOR DEPO-PROVERA CONTRACEPTION: Primary | ICD-10-CM

## 2021-06-14 PROCEDURE — 96372 THER/PROPH/DIAG INJ SC/IM: CPT | Performed by: NURSE PRACTITIONER

## 2021-06-14 RX ORDER — MEDROXYPROGESTERONE ACETATE 150 MG/ML
150 INJECTION, SUSPENSION INTRAMUSCULAR
Status: DISCONTINUED | OUTPATIENT
Start: 2021-06-14 | End: 2022-01-18

## 2021-06-14 RX ADMIN — MEDROXYPROGESTERONE ACETATE 150 MG: 150 INJECTION, SUSPENSION INTRAMUSCULAR at 15:27

## 2021-08-18 ENCOUNTER — OFFICE VISIT (OUTPATIENT)
Dept: GASTROENTEROLOGY | Facility: CLINIC | Age: 21
End: 2021-08-18

## 2021-08-18 VITALS
WEIGHT: 124.8 LBS | HEIGHT: 65 IN | DIASTOLIC BLOOD PRESSURE: 81 MMHG | SYSTOLIC BLOOD PRESSURE: 122 MMHG | HEART RATE: 96 BPM | BODY MASS INDEX: 20.79 KG/M2

## 2021-08-18 DIAGNOSIS — R11.0 NAUSEA: ICD-10-CM

## 2021-08-18 DIAGNOSIS — K21.00 GASTROESOPHAGEAL REFLUX DISEASE WITH ESOPHAGITIS WITHOUT HEMORRHAGE: Primary | ICD-10-CM

## 2021-08-18 PROCEDURE — 99213 OFFICE O/P EST LOW 20 MIN: CPT | Performed by: NURSE PRACTITIONER

## 2021-08-18 RX ORDER — PANTOPRAZOLE SODIUM 40 MG/1
40 TABLET, DELAYED RELEASE ORAL DAILY
Qty: 30 TABLET | Refills: 5 | Status: SHIPPED | OUTPATIENT
Start: 2021-08-18 | End: 2022-02-21 | Stop reason: SDUPTHER

## 2021-08-18 RX ORDER — PROMETHAZINE HYDROCHLORIDE 25 MG/1
25 TABLET ORAL EVERY 6 HOURS PRN
Qty: 30 TABLET | Refills: 5 | Status: SHIPPED | OUTPATIENT
Start: 2021-08-18 | End: 2022-02-21 | Stop reason: SDUPTHER

## 2021-08-18 NOTE — PROGRESS NOTES
Chief Complaint   Patient presents with   • Nausea   • Heartburn       Subjective    Mandi Walters is a 21 y.o. female. she is here today for follow-up.    History of Present Illness  21-year-old female presents to discuss chronic reflux nausea.  Reports she is doing well as long she takes her medication daily states she is not having much issue with nausea only requires her promethazine about every other day.  Denies any vomiting episodes.  Reports she has noted some abdominal pain when she drinks dark sodas and is not willing to stop drinking dark sodas.  Her previous EGD noted esophagitis and gastritis states she has not been having diarrhea or lower pain so has not taken Bentyl.  Reports bowel movements are regular with Colace as needed.       The following portions of the patient's history were reviewed and updated as appropriate:   Past Medical History:   Diagnosis Date   • Bacterial vaginosis    • Costal chondritis    • Fatigue    • GERD (gastroesophageal reflux disease)    • Hypoglycemia    • Malaise and fatigue    • Menorrhagia    • Well child visit      Past Surgical History:   Procedure Laterality Date   • ENDOSCOPY N/A 2019    Procedure: ESOPHAGOGASTRODUODENOSCOPY possible dilation;  Surgeon: Guicho Quiñonez MD;  Location: [facility] ENDOSCOPY;  Service: Gastroenterology   • TUBAL ABDOMINAL LIGATION  2020   • TYMPANOSTOMY TUBE PLACEMENT      Tympanostomy tube-2 (1)      Family History   Problem Relation Age of Onset   • Osteoarthritis Mother    • Diabetes Maternal Grandmother    • Heart disease Maternal Grandmother    • Osteoarthritis Maternal Grandmother    • Coronary artery disease Paternal Grandfather      OB History        1    Para        Term                AB   1    Living           SAB   1    TAB        Ectopic        Molar        Multiple        Live Births                  Prior to Admission medications    Medication Sig Start Date End Date Taking? Authorizing  Provider   medroxyPROGESTERone (DEPO-PROVERA) 150 MG/ML injection Inject 1 mL into the appropriate muscle as directed by prescriber Every 3 (Three) Months. 2/19/21  Yes Tena Porras APRN   pantoprazole (PROTONIX) 40 MG EC tablet Take 1 tablet by mouth Daily. 5/18/21  Yes Sherrell Bull APRN   promethazine (PHENERGAN) 25 MG tablet Take 1 tablet by mouth Every 6 (Six) Hours As Needed for Nausea or Vomiting. 5/18/21  Yes Sherrell Bull APRN   Stool Softener 100 MG capsule TAKE 1 CAPSULE BY MOUTH TWICE DAILY AS NEEDED FOR CONSTIPATION 11/9/20  Yes Sherrell Bull APRN   dicyclomine (Bentyl) 10 MG capsule Take 1 capsule by mouth 4 (Four) Times a Day Before Meals & at Bedtime. 5/18/21 8/18/21  Sherrell Bull APRN     Allergies   Allergen Reactions   • Amoxicillin Anaphylaxis     Anaphylaxis   • Loratadine Unknown - High Severity     Unknown   • Penicillins Anaphylaxis   • Codeine Rash     Social History     Socioeconomic History   • Marital status: Single     Spouse name: Not on file   • Number of children: Not on file   • Years of education: Not on file   • Highest education level: Not on file   Tobacco Use   • Smoking status: Current Some Day Smoker     Packs/day: 0.25     Years: 7.00     Pack years: 1.75   • Smokeless tobacco: Never Used   Substance and Sexual Activity   • Alcohol use: No   • Drug use: No   • Sexual activity: Yes     Partners: Male     Birth control/protection: Tubal ligation, Injection       Review of Systems  Review of Systems   Constitutional: Positive for fatigue. Negative for activity change, appetite change, chills, diaphoresis, fever and unexpected weight change.   HENT: Negative for sore throat and trouble swallowing.    Respiratory: Negative for shortness of breath.    Gastrointestinal: Positive for abdominal pain (with intake especially dark sodas ) and nausea. Negative for abdominal distention, anal bleeding, blood in stool, constipation, diarrhea, rectal pain and vomiting.  "  Musculoskeletal: Negative for arthralgias.   Skin: Negative for pallor.   Neurological: Negative for light-headedness.        /81 (BP Location: Left arm)   Pulse 96   Ht 165.1 cm (65\")   Wt 56.6 kg (124 lb 12.8 oz)   BMI 20.77 kg/m²     Objective    Physical Exam  Constitutional:       General: She is not in acute distress.     Appearance: Normal appearance. She is normal weight. She is not ill-appearing.   HENT:      Head: Normocephalic and atraumatic.   Abdominal:      General: Bowel sounds are normal. There is no distension.      Palpations: Abdomen is soft.      Tenderness: There is abdominal tenderness in the epigastric area.   Neurological:      Mental Status: She is alert.       Office Visit on 02/19/2021   Component Date Value Ref Range Status   • Case Report 02/19/2021    Final                    Value:Gynecologic Cytology Report                       Case: [id number]                                  Authorizing Provider:  Tena Porras    Collected:           02/19/2021 10:06 AM                                 ANH Wynne                                                                  Ordering Location:     [facility]     Received:            02/19/2021 10:24 AM                                 [facility]                                                                 First Screen:          Nasra Kelley                                                               Specimen:    Sendout to [facility], Cervix                                                                    • Interpretation 02/19/2021 See attached report   Final     Assessment/Plan      1. Gastroesophageal reflux disease with esophagitis without hemorrhage    2. Nausea    .   Continue PPI daily encourage patient to only use promethazine sparingly and let her know no further refills will be done until next follow-up visit.    Orders placed during this encounter include:  No orders of the defined types were " placed in this encounter.      * Surgery not found *    Review and/or summary of lab tests, radiology, procedures, medications. Review and summary of old records and obtaining of history. The risks and benefits of my recommendations, as well as other treatment options were discussed with the patient today. Questions were answered.    New Medications Ordered This Visit   Medications   • pantoprazole (PROTONIX) 40 MG EC tablet     Sig: Take 1 tablet by mouth Daily.     Dispense:  30 tablet     Refill:  5   • promethazine (PHENERGAN) 25 MG tablet     Sig: Take 1 tablet by mouth Every 6 (Six) Hours As Needed for Nausea or Vomiting.     Dispense:  30 tablet     Refill:  5       Follow-up: Return in about 6 months (around 2/18/2022) for Recheck.          This document has been electronically signed by ANH Xiao on August 18, 2021 10:00 CDT           I spent 15 minutes caring for Mandi on this date of service. This time includes time spent by me in the following activities:preparing for the visit, reviewing tests, obtaining and/or reviewing a separately obtained history, performing a medically appropriate examination and/or evaluation , counseling and educating the patient/family/caregiver, ordering medications, tests, or procedures, referring and communicating with other health care professionals , documenting information in the medical record and care coordination    Results for orders placed or performed in visit on 02/19/21   Liquid-based Pap Smear, Screening    Specimen: Cervix; ThinPrep Vial   Result Value Ref Range    Case Report       Gynecologic Cytology Report                       Case: [id number]                                  Authorizing Provider:  Tena Porras    Collected:           02/19/2021 10:06 AM                                 ANH Wynne                                                                  Ordering Location[facility]     Received:             02/19/2021 10:24 AM                                 [facility]                                                                 First Screen:          Nasra Kelley                                                               Specimen:    Sendout to [facility], Cervix                                                                     Interpretation See attached report    Results for orders placed or performed in visit on 11/06/20   POC Pregnancy, Urine    Specimen: Urine   Result Value Ref Range    HCG, Urine, QL Negative Negative    Lot Number [id number]     Internal Positive Control Positive     Internal Negative Control Negative    Results for orders placed or performed in visit on 05/14/20   CBC Auto Differential    Specimen: Blood   Result Value Ref Range    WBC 5.94 3.40 - 10.80 10*3/mm3    RBC 4.98 3.77 - 5.28 10*6/mm3    Hemoglobin 14.4 12.0 - 15.9 g/dL    Hematocrit 45.9 34.0 - 46.6 %    MCV 92.2 79.0 - 97.0 fL    MCH 28.9 26.6 - 33.0 pg    MCHC 31.4 (L) 31.5 - 35.7 g/dL    RDW 14.7 12.3 - 15.4 %    RDW-SD 48.0 37.0 - 54.0 fl    MPV 10.2 6.0 - 12.0 fL    Platelets 250 140 - 450 10*3/mm3    Neutrophil % 51.5 42.7 - 76.0 %    Lymphocyte % 38.6 19.6 - 45.3 %    Monocyte % 7.1 5.0 - 12.0 %    Eosinophil % 2.5 0.3 - 6.2 %    Basophil % 0.3 0.0 - 1.5 %    Neutrophils, Absolute 3.06 1.70 - 7.00 10*3/mm3    Lymphocytes, Absolute 2.29 0.70 - 3.10 10*3/mm3    Monocytes, Absolute 0.42 0.10 - 0.90 10*3/mm3    Eosinophils, Absolute 0.15 0.00 - 0.40 10*3/mm3    Basophils, Absolute 0.02 0.00 - 0.20 10*3/mm3   Vitamin D 25 Hydroxy    Specimen: Blood   Result Value Ref Range    25 Hydroxy, Vitamin D 38.3 30.0 - 100.0 ng/ml   TSH    Specimen: Blood   Result Value Ref Range    TSH 1.520 0.270 - 4.200 uIU/mL   T4, Free    Specimen: Blood   Result Value Ref Range    Free T4 1.37 0.93 - 1.70 ng/dL   Vitamin B12    Specimen: Blood   Result Value Ref Range    Vitamin B-12 901 211 - 946 pg/mL   Lipid Panel    Specimen: Blood   Result  Value Ref Range    Total Cholesterol 141 (L) 150 - 200 mg/dL    Triglycerides 97 <=150 mg/dL    HDL Cholesterol 79 (H) 40 - 59 mg/dL    LDL Cholesterol  43 <=100 mg/dL    VLDL Cholesterol 19.4 mg/dL    LDL/HDL Ratio 0.54 0.00 - 3.22   Comprehensive Metabolic Panel    Specimen: Blood   Result Value Ref Range    Glucose 67 (L) 70 - 99 mg/dL    BUN 8 7 - 23 mg/dL    Creatinine 0.82 0.52 - 1.04 mg/dL    Sodium 142 137 - 145 mmol/L    Potassium 4.0 3.4 - 5.0 mmol/L    Chloride 107 101 - 112 mmol/L    CO2 27.0 22.0 - 30.0 mmol/L    Calcium 10.0 8.4 - 10.2 mg/dL    Total Protein 7.3 6.3 - 8.6 g/dL    Albumin 4.50 3.50 - 5.00 g/dL    ALT (SGPT) 10 <=35 U/L    AST (SGOT) 18 14 - 36 U/L    Alkaline Phosphatase 41 38 - 126 U/L    Total Bilirubin 0.4 0.2 - 1.3 mg/dL    eGFR Non  Amer 87 71 - 165 mL/min/1.73    Globulin 2.8 2.3 - 3.5 gm/dL    A/G Ratio 1.6 1.1 - 1.8 g/dL    BUN/Creatinine Ratio 9.8 7.0 - 25.0    Anion Gap 8.0 5.0 - 15.0 mmol/L   Results for orders placed or performed in visit on 10/08/19   Liquid-based Pap Smear, Screening    Specimen: Cervix; ThinPrep Vial   Result Value Ref Range    Case Report       Gynecologic Cytology Report                       Case: [id number]                                  Authorizing Provider:  Tena Porras    Collected:           10/08/2019 02:00 PM                                 ANH Wynne                                                                  Ordering Location:     [facility]     Received:            10/08/2019 04:01 PM                                 [facility]                                                               First Screen:          Matilda Renee                                                              Pathologist:           Fabio Jacobs MD                                                           Specimen:    Liquid-Based Pap, Screening, Cervix                                                        Interpretation  Negative for intraepithelial lesion or malignancy      General Categorization Within normal limits     Other Findings Reactive cellular changes     Specimen Adequacy       Satisfactory for evaluation, endocervical/transformation zone component present    Additional Information       Disclaimer: Cervical cytology is a screening test primarily for squamous cancer and its precursors and has associated false-negative and false-positive results.  Technologies such as liquid-based preparations may decrease but will not eliminate all false-negative results.  Follow-up of unexplained clinical signs and symptoms is recommended to minimize false-negative results. (The [employer] System for Reporting Cervical Cytology: Beth, 2015).     Results for orders placed or performed during the hospital encounter of 07/23/19   Tissue Pathology Exam    Specimen: A: Small Intestine, Duodenum; Tissue    B: Gastric, Antrum; Tissue    C: Esophagus, Distal; Tissue   Result Value Ref Range    Case Report       Surgical Pathology Report                         Case: [id number]                                  Authorizing Provider:  Guicho Quiñonez MD        Collected:           07/23/2019 02:33 PM          Ordering Location:     [facility]             Received:            07/24/2019 06:48 AM                                 [place] [facility]                                                     Pathologist:           Fabio Jacobs MD                                                           Specimens:   1) - Small Intestine, Duodenum, small bowel bx                                                      2) - Gastric, Antrum, antrum bx                                                                     3) - Esophagus, Distal, distal esophagus bx                                                Final Diagnosis       1.  SMALL BOWEL, BIOPSY:  NO SIGNIFICANT HISTOLOGIC ABNORMALITY.    2.  GASTRIC ANTRUM, BIOPSY:  NO SIGNIFICANT HISTOLOGIC  ABNORMALITY.    3.  DISTAL ESOPHAGUS, BIOPSY:  BENIGN SQUAMOUS MUCOSA.      Gross Description       1.  Two tan fragments measure 0.8 x 0.5 x 0.2 cm together.  Totally submitted.    2.  Two tan fragments measure 0.5 x 0.5 x 0.2 cm together.  Totally submitted.    3.  Two gray fragments measure 0.8 x 0.7 x 0.1 cm together.  Totally submitted.         Pregnancy, Urine -    Specimen: Urine   Result Value Ref Range    HCG, Urine QL Negative Negative   Results for orders placed or performed in visit on 06/14/19   Chlamydia trachomatis, Neisseria gonorrhoeae, Trichomonas vaginalis, PCR - Urine, Urine, Clean Catch    Specimen: Urine, Clean Catch   Result Value Ref Range    Chlamydia DNA by PCR Negative Negative    Neisseria gonorrhoeae by PCR Negative Negative    Trichomonas vaginalis PCR Negative    Results for orders placed or performed in visit on 05/07/19   Urinalysis With Culture If Indicated - Urine, Clean Catch    Specimen: Urine, Clean Catch   Result Value Ref Range    Color, UA Yellow Yellow, Straw    Appearance, UA Clear Clear    pH, UA 5.5 5.5 - 8.0    Specific Gravity, UA >=1.030 1.005 - 1.030    Glucose, UA Negative Negative    Ketones, UA Trace (A) Negative    Bilirubin, UA Negative Negative    Blood, UA Negative Negative    Protein, UA Negative Negative    Leuk Esterase, UA Negative Negative    Nitrite, UA Negative Negative    Urobilinogen, UA 1.0 E.U./dL 0.2 - 1.0 E.U./dL   CBC Auto Differential    Specimen: Blood   Result Value Ref Range    WBC 6.71 3.40 - 10.80 10*3/mm3    RBC 4.99 3.77 - 5.28 10*6/mm3    Hemoglobin 14.5 12.0 - 15.9 g/dL    Hematocrit 44.6 34.0 - 46.6 %    MCV 89.4 79.0 - 97.0 fL    MCH 29.1 26.6 - 33.0 pg    MCHC 32.5 31.5 - 35.7 g/dL    RDW 14.4 12.3 - 15.4 %    RDW-SD 45.8 37.0 - 54.0 fl    MPV 10.4 6.0 - 12.0 fL    Platelets 261 140 - 450 10*3/mm3    Neutrophil % 41.6 (L) 42.7 - 76.0 %    Lymphocyte % 49.0 (H) 19.6 - 45.3 %    Monocyte % 7.0 5.0 - 12.0 %    Eosinophil % 2.1 0.3 - 6.2  %    Basophil % 0.3 0.0 - 1.5 %    Neutrophils, Absolute 2.79 1.70 - 7.00 10*3/mm3    Lymphocytes, Absolute 3.29 (H) 0.70 - 3.10 10*3/mm3    Monocytes, Absolute 0.47 0.10 - 0.90 10*3/mm3    Eosinophils, Absolute 0.14 0.00 - 0.40 10*3/mm3    Basophils, Absolute 0.02 0.00 - 0.20 10*3/mm3     *Note: Due to a large number of results and/or encounters for the requested time period, some results have not been displayed. A complete set of results can be found in Results Review.

## 2021-08-18 NOTE — PATIENT INSTRUCTIONS
MyPlate from USDA    MyPlate is an outline of a general healthy diet based on the 2010 Dietary Guidelines for Americans, from the [place] Department of Agriculture (USDA). It sets guidelines for how much food you should eat from each food group based on your age, sex, and level of physical activity.  What are tips for following MyPlate?  To follow MyPlate recommendations:  · Eat a wide variety of fruits and vegetables, grains, and protein foods.  · Serve smaller portions and eat less food throughout the day.  · Limit portion sizes to avoid overeating.  · Enjoy your food.  · Get at least 150 minutes of exercise every week. This is about 30 minutes each day, 5 or more days per week.  It can be difficult to have every meal look like MyPlate. Think about MyPlate as eating guidelines for an entire day, rather than each individual meal.  Fruits and vegetables  · Make half of your plate fruits and vegetables.  · Eat many different colors of fruits and vegetables each day.  · For a 2,000 calorie daily food plan, eat:  ? 2½ cups of vegetables every day.  ? 2 cups of fruit every day.  · 1 cup is equal to:  ? 1 cup raw or cooked vegetables.  ? 1 cup raw fruit.  ? 1 medium-sized orange, apple, or banana.  ? 1 cup 100% fruit or vegetable juice.  ? 2 cups raw leafy greens, such as lettuce, spinach, or kale.  ? ½ cup dried fruit.  Grains  · One fourth of your plate should be grains.  · Make at least half of the grains you eat each day whole grains.  · For a 2,000 calorie daily food plan, eat 6 oz of grains every day.  · 1 oz is equal to:  ? 1 slice bread.  ? 1 cup cereal.  ? ½ cup cooked rice, cereal, or pasta.  Protein  · One fourth of your plate should be protein.  · Eat a wide variety of protein foods, including meat, poultry, fish, eggs, beans, nuts, and tofu.  · For a 2,000 calorie daily food plan, eat 5½ oz of protein every day.  · 1 oz is equal to:  ? 1 oz meat, poultry, or fish.  ? ¼ cup cooked beans.  ? 1 egg.  ? ½ oz nuts  or seeds.  ? 1 Tbsp peanut butter.  Dairy  · Drink fat-free or low-fat (1%) milk.  · Eat or drink dairy as a side to meals.  · For a 2,000 calorie daily food plan, eat or drink 3 cups of dairy every day.  · 1 cup is equal to:  ? 1 cup milk, yogurt, cottage cheese, or soy milk (soy beverage).  ? 2 oz processed cheese.  ? 1½ oz natural cheese.  Fats, oils, salt, and sugars  · Only small amounts of oils are recommended.  · Avoid foods that are high in calories and low in nutritional value (empty calories), like foods high in fat or added sugars.  · Choose foods that are low in salt (sodium). Choose foods that have less than 140 milligrams (mg) of sodium per serving.  · Drink water instead of sugary drinks. Drink enough water each day to keep your urine pale yellow.  Where to find support  · Work with your health care provider or a nutrition specialist (dietitian) to develop a customized eating plan that is right for you.  · Download an richard (mobile application) to help you track your daily food intake.  Where to find more information  · Go to ChooseMyPlate.gov for more information.  Summary  · MyPlate is a general guideline for healthy eating from the USDA. It is based on the 2010 Dietary Guidelines for Americans.  · In general, fruits and vegetables should take up ½ of your plate, grains should take up ¼ of your plate, and protein should take up ¼ of your plate.  This information is not intended to replace advice given to you by your health care provider. Make sure you discuss any questions you have with your health care provider.  Document Revised: 05/21/2020 Document Reviewed: 03/19/2018  Elsevier Patient Education © 2021 [employer]    Food Choices for Gastroesophageal Reflux Disease, Adult  When you have gastroesophageal reflux disease (GERD), the foods you eat and your eating habits are very important. Choosing the right foods can help ease your discomfort. Think about working with a food expert (dietitian) to  help you make good choices.  What are tips for following this plan?  Reading food labels  · Read the label for foods that are low in saturated fat. Foods that may help with your symptoms include:  ? Foods that have less than 5% of daily value (DV) of fat.  ? Foods that have 0 grams of trans fat.  Cooking  · Do not akins your food. Cook your food by baking, steaming, grilling, or broiling. These are all methods that do not need a lot of fat for cooking.  · To add flavor, try to use herbs that are low in spice and acidity.  Meal planning    · Choose healthy foods that are low in fat, such as:  ? Fruits and vegetables.  ? Whole grains.  ? Low-fat dairy products.  ? Lean meats, fish, and poultry.  · Eat small meals often instead of eating 3 large meals each day. Eat your meals slowly in a place where you are relaxed. Avoid bending over or lying down until 2-3 hours after eating.  · Limit high-fat foods such as fatty meats or fried foods.  · Limit your intake of oils, butter, and shortening to less than 8 teaspoons each day.  · Avoid the following:  ? Foods that cause symptoms. These may be different for different people. Keep a food diary to keep track of foods that cause symptoms.  ? Alcohol.  ? Drinking a lot of liquid with meals.  ? Eating meals during the 2-3 hours before bed.  Lifestyle  · Stay at a healthy weight. Ask your doctor what weight is healthy for you. If you need to lose weight, work with your doctor to do so safely.  · Exercise for at least 30 minutes on 5 or more days each week, or as told by your doctor.  · Wear loose-fitting clothes.  · Do not use any products that contain nicotine or tobacco, such as cigarettes, e-cigarettes, and chewing tobacco. If you need help quitting, ask your doctor.  · Sleep with the head of your bed higher than your feet. Use a wedge under the mattress or blocks under the bed frame to raise the head of the bed.  What foods should eat?    Eat a healthy, well-balanced diet of  fruits, vegetables, whole grains, low-fat dairy products, lean meats, fish, and poultry. Each person is different. Foods that may cause symptoms in one person may not cause any symptoms in another person. Work with your doctor to find foods that are safe for you.  The items listed above may not be a complete list of foods and beverages you can eat. Contact a dietitian for more information.  What foods should I avoid?  Limiting some of these foods may help in managing the symptoms of GERD. Everyone is different. Talk with a food expert or your doctor to help you find the exact foods to avoid, if any.  Fruits  Any fruits prepared with added fat. Any fruits that cause symptoms. For some people, this may include citrus fruits, such as oranges, grapefruit, pineapple, and sukhdeep.  Vegetables  Deep-fried vegetables. French fries. Any vegetables prepared with added fat. Any vegetables that cause symptoms. For some people, this may include tomatoes and tomato products, chili peppers, onions and garlic, and horseradish.  Grains  Pastries or quick breads with added fat.  Meats and other proteins  High-fat meats, such as fatty beef or pork, hot dogs, ribs, ham, sausage, salami, and roth. Fried meat or protein, including fried fish and fried chicken. Nuts and nut butters.  Dairy  Whole milk and chocolate milk. Sour cream. Cream. Ice cream. Cream cheese. Milkshakes.  Fats and oils  Butter. Margarine. Shortening. Ghee.  Beverages  Coffee and tea, with or without caffeine. Carbonated beverages. Sodas. Energy drinks. Fruit juice made with acidic fruits (such as orange or grapefruit). Tomato juice. Alcoholic drinks.  Sweets and desserts  Chocolate and cocoa. Donuts.  Seasonings and condiments  Pepper. Peppermint and spearmint. Added salt. Any condiments, herbs, or seasonings that cause symptoms. For some people, this may include marina, hot sauce, or vinegar-based salad dressings.  The items listed above may not be a complete list of  foods and beverages you should avoid. Contact a dietitian for more information.  Questions to ask your doctor  Diet and lifestyle changes are often the first steps that are taken to manage symptoms of GERD. If diet and lifestyle changes do not help, talk with your doctor about taking medicines.  Where to find more information  · [employer]: aboutgerd.org  Summary  · When you have GERD, food and lifestyle choices are very important in easing your symptoms.  · Eat small meals often instead of 3 large meals a day. Eat your meals slowly and in a place where you are relaxed.  · Avoid bending over or lying down until 2-3 hours after eating.  · Limit high-fat foods such as fatty meat or fried foods.  This information is not intended to replace advice given to you by your health care provider. Make sure you discuss any questions you have with your health care provider.  Document Revised: 10/12/2020 Document Reviewed: 10/12/2020  Elsevier Patient Education © 2021 [employer]

## 2021-11-15 RX ORDER — FAMOTIDINE 40 MG/1
40 TABLET, FILM COATED ORAL NIGHTLY PRN
Qty: 30 TABLET | Refills: 5 | Status: SHIPPED | OUTPATIENT
Start: 2021-11-15 | End: 2022-02-21

## 2021-11-29 RX ORDER — PANTOPRAZOLE SODIUM 40 MG/1
40 TABLET, DELAYED RELEASE ORAL DAILY
Qty: 30 TABLET | Refills: 5 | OUTPATIENT
Start: 2021-11-29

## 2021-11-29 RX ORDER — PROMETHAZINE HYDROCHLORIDE 25 MG/1
25 TABLET ORAL EVERY 6 HOURS PRN
Qty: 30 TABLET | Refills: 5 | OUTPATIENT
Start: 2021-11-29

## 2021-12-08 RX ORDER — PROMETHAZINE HYDROCHLORIDE 25 MG/1
TABLET ORAL
Qty: 30 TABLET | Refills: 5 | OUTPATIENT
Start: 2021-12-08

## 2021-12-09 ENCOUNTER — TELEPHONE (OUTPATIENT)
Dept: GASTROENTEROLOGY | Facility: CLINIC | Age: 21
End: 2021-12-09

## 2021-12-09 NOTE — TELEPHONE ENCOUNTER
Spoke with patent regarding refills on her phenergan and protonix. Both prescriptions were written on 8-18-21 with five refills and quantity of 30 , it was explained to the patient that she should have refills on the medications until January 18,2022. Patient voiced understanding.

## 2022-01-18 ENCOUNTER — OFFICE VISIT (OUTPATIENT)
Dept: OBSTETRICS AND GYNECOLOGY | Facility: CLINIC | Age: 22
End: 2022-01-18

## 2022-01-18 VITALS
WEIGHT: 126.8 LBS | SYSTOLIC BLOOD PRESSURE: 100 MMHG | BODY MASS INDEX: 21.13 KG/M2 | DIASTOLIC BLOOD PRESSURE: 60 MMHG | HEART RATE: 80 BPM | HEIGHT: 65 IN

## 2022-01-18 DIAGNOSIS — Z70.8 COUNSELING REGARDING HUMAN PAPILLOMA VIRUS (HPV): ICD-10-CM

## 2022-01-18 DIAGNOSIS — N90.89 VULVAR IRRITATION: Primary | ICD-10-CM

## 2022-01-18 PROCEDURE — 99213 OFFICE O/P EST LOW 20 MIN: CPT | Performed by: NURSE PRACTITIONER

## 2022-01-18 NOTE — PROGRESS NOTES
"Subjective   Mandi Walters is a 21 y.o. female.     History of Present Illness   Pt presents with concerns for \"white bumps\" on the vulva around the clitoris. Noticed yesterday. They do not itch and are not painful. She did squeeze on one of the areas and caused it to bleed and become uncomfortable. Never had anything like this before.     Pt has had HPV vaccine. HPV4 7/22/15, 5/27/15 and HPV9 10/27/17. Worried about this possibly being a genital wart.     The following portions of the patient's history were reviewed and updated as appropriate: allergies, current medications, past family history, past medical history, past social history, past surgical history and problem list.    Review of Systems   Constitutional: Negative.  Negative for chills and fever.   Genitourinary: Positive for genital sores (bumps). Negative for dysuria, pelvic pain, vaginal bleeding, vaginal discharge and vaginal pain.   Psychiatric/Behavioral: Negative.  Negative for depressed mood. The patient is not nervous/anxious.        Objective   Physical Exam  Vitals reviewed. Exam conducted with a chaperone present.   Constitutional:       Appearance: Normal appearance.   Pulmonary:      Effort: Pulmonary effort is normal.   Genitourinary:     Labia:         Right: No rash, tenderness, lesion or injury.         Left: No rash, tenderness, lesion or injury.       Vagina: Normal. No signs of injury and foreign body. No vaginal discharge, erythema or tenderness. Bleeding: scant spotting, ending her period now.       Neurological:      Mental Status: She is alert and oriented to person, place, and time.           Assessment/Plan   Diagnoses and all orders for this visit:    1. Vulvar irritation (Primary)    2. Counseling regarding human papilloma virus (HPV)    Reassured pt that the areas appear to be benign little glands and likely present for a while, she just happened to notice yesterday. Pt agrees she may not have assessed in this manor " before. It appears she has irritated one of them by squeezing. Recommended she avoid manipulation. Keep the area clean with dove soap. RTC if they enlarge and/or become painful and irritated. Pt voices understanding.     I explained the HPV vaccines and reassured her that these lesions do not appear to be genital warts or HSV. Pt voices understanding.     She had a pap smear with me 2/19/2021 but also states Dr. Taylor did one in Oct 2021. We confirmed that she did indeed have a complete [employer] and pap test in Oct there. She can RTC here or to him in Oct 2022 for [employer] or sooner PRN.

## 2022-02-15 RX ORDER — DOCUSATE SODIUM 100 MG/1
CAPSULE, LIQUID FILLED ORAL
Qty: 60 CAPSULE | Refills: 5 | Status: CANCELLED | OUTPATIENT
Start: 2022-02-15

## 2022-02-15 RX ORDER — DOCUSATE SODIUM 100 MG/1
CAPSULE, LIQUID FILLED ORAL
Qty: 60 CAPSULE | Refills: 5 | Status: SHIPPED | OUTPATIENT
Start: 2022-02-15 | End: 2023-02-21

## 2022-02-21 ENCOUNTER — OFFICE VISIT (OUTPATIENT)
Dept: GASTROENTEROLOGY | Facility: CLINIC | Age: 22
End: 2022-02-21

## 2022-02-21 VITALS
WEIGHT: 124 LBS | HEART RATE: 95 BPM | BODY MASS INDEX: 20.66 KG/M2 | SYSTOLIC BLOOD PRESSURE: 106 MMHG | DIASTOLIC BLOOD PRESSURE: 77 MMHG | HEIGHT: 65 IN

## 2022-02-21 DIAGNOSIS — R10.84 GENERALIZED ABDOMINAL PAIN: ICD-10-CM

## 2022-02-21 DIAGNOSIS — R19.4 CHANGE IN BOWEL HABITS: ICD-10-CM

## 2022-02-21 DIAGNOSIS — R19.7 DIARRHEA, UNSPECIFIED TYPE: ICD-10-CM

## 2022-02-21 DIAGNOSIS — K21.00 GASTROESOPHAGEAL REFLUX DISEASE WITH ESOPHAGITIS WITHOUT HEMORRHAGE: Primary | ICD-10-CM

## 2022-02-21 PROCEDURE — 99213 OFFICE O/P EST LOW 20 MIN: CPT | Performed by: NURSE PRACTITIONER

## 2022-02-21 RX ORDER — DEXTROSE AND SODIUM CHLORIDE 5; .45 G/100ML; G/100ML
30 INJECTION, SOLUTION INTRAVENOUS CONTINUOUS PRN
Status: CANCELLED | OUTPATIENT
Start: 2022-02-21

## 2022-02-21 RX ORDER — POLYETHYLENE GLYCOL 3350, SODIUM SULFATE, SODIUM CHLORIDE, POTASSIUM CHLORIDE, ASCORBIC ACID, SODIUM ASCORBATE 7.5-2.691G
1000 KIT ORAL EVERY 12 HOURS
Qty: 1000 ML | Refills: 0 | Status: SHIPPED | OUTPATIENT
Start: 2022-02-21 | End: 2022-09-14

## 2022-02-21 RX ORDER — PROMETHAZINE HYDROCHLORIDE 25 MG/1
25 TABLET ORAL EVERY 6 HOURS PRN
Qty: 30 TABLET | Refills: 5 | Status: SHIPPED | OUTPATIENT
Start: 2022-02-21 | End: 2022-09-14 | Stop reason: SDUPTHER

## 2022-02-21 RX ORDER — PANTOPRAZOLE SODIUM 40 MG/1
40 TABLET, DELAYED RELEASE ORAL DAILY
Qty: 30 TABLET | Refills: 5 | Status: SHIPPED | OUTPATIENT
Start: 2022-02-21 | End: 2022-07-07 | Stop reason: SDUPTHER

## 2022-02-21 NOTE — PROGRESS NOTES
Chief Complaint   Patient presents with   • Heartburn   • Nausea       Subjective    Mandi Walters is a 22 y.o. female. she is here today for follow-up.    22-year-old female presents for 6-month recheck regarding heartburn and nausea.  Reports recently she has noted more abdominal burning associated with change in bowel habits and diarrhea denies any melena or hematochezia.  States when the pain hits the degree and seems to improve symptoms discussed importance of only taking Benadryl as needed and states she takes it about every night.  In the past prescribed Bentyl but states she could not tell a difference would not continue it.  Previously she has had issues with constipation and takes Colace when it becomes a problem.  EGD in 2019 noted esophagitis gastritis normal duodenum.  Patient states she has recently changed PCP a lot like it updated in her chart.    Heartburn  She complains of abdominal pain, heartburn and nausea. She reports no belching, no chest pain, no choking, no coughing, no dysphagia, no early satiety, no globus sensation or no hoarse voice. This is a chronic problem. The current episode started more than 1 year ago. The problem occurs frequently. The problem has been waxing and waning. The heartburn duration is several minutes. The symptoms are aggravated by certain foods. Associated symptoms include fatigue.   Nausea  Associated symptoms include abdominal pain, fatigue and nausea. Pertinent negatives include no chest pain, chills, coughing, diaphoresis, fever or vomiting.     Plan; schedule patient for EGD due to worsening reflux symptoms obtain biopsy rule out H. pylori gastritis or Peterson's esophagus.  Schedule patient for colonoscopy due to lower abdominal pain diarrhea and will obtain biopsy to rule out inflammatory bowel disease       The following portions of the patient's history were reviewed and updated as appropriate:   Past Medical History:   Diagnosis Date   • Bacterial  vaginosis    • Chlamydia    • Costal chondritis    • Fatigue    • GERD (gastroesophageal reflux disease)    • Gonorrhea    • Hypoglycemia    • Malaise and fatigue    • Menorrhagia    • Well child visit      Past Surgical History:   Procedure Laterality Date   • ENDOSCOPY N/A 2019    Procedure: ESOPHAGOGASTRODUODENOSCOPY possible dilation;  Surgeon: Guicho Quiñonez MD;  Location: [facility] ENDOSCOPY;  Service: Gastroenterology   • TUBAL ABDOMINAL LIGATION  2020   • TYMPANOSTOMY TUBE PLACEMENT      Tympanostomy tube-2 (1)      Family History   Problem Relation Age of Onset   • Osteoarthritis Mother    • Diabetes Maternal Grandmother    • Heart disease Maternal Grandmother    • Osteoarthritis Maternal Grandmother    • Coronary artery disease Paternal Grandfather      OB History        1    Para        Term                AB   1    Living           SAB   1    IAB        Ectopic        Molar        Multiple        Live Births                  Prior to Admission medications    Medication Sig Start Date End Date Taking? Authorizing Provider    MG capsule TAKE 1 CAPSULE BY MOUTH TWICE DAILY AS NEEDED FOR CONSTIPATION 2/15/22  Yes Sherrell Bull APRN   pantoprazole (PROTONIX) 40 MG EC tablet Take 1 tablet by mouth Daily. 21  Yes Sherrell Bull APRN   promethazine (PHENERGAN) 25 MG tablet Take 1 tablet by mouth Every 6 (Six) Hours As Needed for Nausea or Vomiting. 21  Yes Sherrell Bull APRN   famotidine (PEPCID) 40 MG tablet TAKE 1 TABLET BY MOUTH AT NIGHT AS NEEDED FOR HEARTBURN 11/15/21 2/21/22  Sherrell Bull APRN     Allergies   Allergen Reactions   • Amoxicillin Anaphylaxis     Anaphylaxis   • Loratadine Unknown - High Severity     Unknown   • Penicillins Anaphylaxis   • Codeine Rash     Social History     Socioeconomic History   • Marital status: Single   Tobacco Use   • Smoking status: Former Smoker     Packs/day: 0.25     Years: 7.00     Pack years: 1.75     Quit date:       "Years since quittin.1   • Smokeless tobacco: Never Used   Substance and Sexual Activity   • Alcohol use: No   • Drug use: No   • Sexual activity: Yes     Partners: Male     Birth control/protection: Tubal ligation       Review of Systems  Review of Systems   Constitutional: Positive for fatigue. Negative for activity change, appetite change, chills, diaphoresis, fever and unexpected weight change.   HENT: Negative for hoarse voice and trouble swallowing.    Respiratory: Negative for cough and choking.    Cardiovascular: Negative for chest pain.   Gastrointestinal: Positive for abdominal pain, heartburn and nausea. Negative for abdominal distention, anal bleeding, blood in stool, constipation, diarrhea, dysphagia, rectal pain and vomiting.        /77 (BP Location: Left arm)   Pulse 95   Ht 165.1 cm (65\")   Wt 56.2 kg (124 lb)   BMI 20.63 kg/m²     Objective    Physical Exam  Constitutional:       General: She is not in acute distress.     Appearance: Normal appearance. She is normal weight. She is not ill-appearing.   HENT:      Head: Normocephalic and atraumatic.   Abdominal:      General: Abdomen is flat. Bowel sounds are normal. There is distension (mild ).      Palpations: Abdomen is soft. There is no mass.      Tenderness: There is abdominal tenderness in the right lower quadrant, periumbilical area and suprapubic area.   Neurological:      Mental Status: She is alert.       Office Visit on 2021   Component Date Value Ref Range Status   • Case Report 2021    Final                    Value:Gynecologic Cytology Report                       Case: [id number]                                  Authorizing Provider:  Tena Porras    Collected:           2021 10:06 AM                                 ANH Wynne                                                                  Ordering Location:     [facility]     Received:            2021 10:24 AM                "                  [facility]                                                                 First Screen:          Nasra Kellyeong                                                               Specimen:    Sendout to [facility], Cervix                                                                    • Interpretation 02/19/2021 See attached report   Final     Assessment/Plan      1. Gastroesophageal reflux disease with esophagitis without hemorrhage    2. Generalized abdominal pain    3. Change in bowel habits    4. Diarrhea, unspecified type    .       Orders placed during this encounter include:  Orders Placed This Encounter   Procedures   • COVID PRE-OP / PRE-PROCEDURE SCREENING ORDER (NO ISOLATION) - Swab, Nasopharynx     Standing Status:   Future     Standing Expiration Date:   2/21/2023     Order Specific Question:   Release to patient     Answer:   Immediate     Order Specific Question:   Please select your location:     Answer:   [facility]     Order Specific Question:   COVID Screening Order:     Answer:   In-House: PRE-OP: BD MAX, 8-10 HR TAT [[id number]]     Order Specific Question:   Previously tested for COVID-19?     Answer:   No     Order Specific Question:   Employed in healthcare setting?     Answer:   No     Order Specific Question:   Symptomatic for COVID-19 as defined by CDC?     Answer:   No     Order Specific Question:   Hospitalized for COVID-19?     Answer:   No     Order Specific Question:   Admitted to ICU for COVID-19?     Answer:   No     Order Specific Question:   Resident in a congregate (group) care setting?     Answer:   No     Order Specific Question:   Pregnant?     Answer:   No   • Follow Anesthesia Guidelines / Standing Orders     Standing Status:   Future   • Obtain Informed Consent     Standing Status:   Future     Order Specific Question:   Informed Consent Given For     Answer:   ESOPHAGOGASTRODUODENOSCOPY and Colonoscopy       ESOPHAGOGASTRODUODENOSCOPY (N/A), COLONOSCOPY  (N/A)    Review and/or summary of lab tests, radiology, procedures, medications. Review and summary of old records and obtaining of history. The risks and benefits of my recommendations, as well as other treatment options were discussed with the patient today. Questions were answered.    New Medications Ordered This Visit   Medications   • MoviPrep 100 g reconstituted solution powder     Sig: Take 1,000 mL by mouth Every 12 (Twelve) Hours.     Dispense:  1000 mL     Refill:  0   • pantoprazole (PROTONIX) 40 MG EC tablet     Sig: Take 1 tablet by mouth Daily.     Dispense:  30 tablet     Refill:  5   • promethazine (PHENERGAN) 25 MG tablet     Sig: Take 1 tablet by mouth Every 6 (Six) Hours As Needed for Nausea or Vomiting.     Dispense:  30 tablet     Refill:  5       Follow-up: Return in about 4 weeks (around 3/21/2022) for Recheck, After test.          This document has been electronically signed by ANH Xiao on February 21, 2022 13:30 CST           I spent 16 minutes caring for Mandi on this date of service. This time includes time spent by me in the following activities:preparing for the visit, reviewing tests, obtaining and/or reviewing a separately obtained history, performing a medically appropriate examination and/or evaluation , counseling and educating the patient/family/caregiver, ordering medications, tests, or procedures, referring and communicating with other health care professionals , documenting information in the medical record and care coordination    Results for orders placed or performed in visit on 02/19/21   Liquid-based Pap Smear, Screening    Specimen: Cervix; ThinPrep Vial   Result Value Ref Range    Case Report       Gynecologic Cytology Report                       Case: [id number]                                  Authorizing Provider:  Tena Porras    Collected:           02/19/2021 10:06 AM                                 ANH Wynne                                                                   Ordering Location:     [facility]     Received:            02/19/2021 10:24 AM                                 [facility]                                                                 First Screen:          Nasra Kelley                                                               Specimen:    Sendout to [facility], Cervix                                                                     Interpretation See attached report    Results for orders placed or performed in visit on 11/06/20   POC Pregnancy, Urine    Specimen: Urine   Result Value Ref Range    HCG, Urine, QL Negative Negative    Lot Number [id number]     Internal Positive Control Positive     Internal Negative Control Negative    Results for orders placed or performed in visit on 05/14/20   CBC Auto Differential    Specimen: Blood   Result Value Ref Range    WBC 5.94 3.40 - 10.80 10*3/mm3    RBC 4.98 3.77 - 5.28 10*6/mm3    Hemoglobin 14.4 12.0 - 15.9 g/dL    Hematocrit 45.9 34.0 - 46.6 %    MCV 92.2 79.0 - 97.0 fL    MCH 28.9 26.6 - 33.0 pg    MCHC 31.4 (L) 31.5 - 35.7 g/dL    RDW 14.7 12.3 - 15.4 %    RDW-SD 48.0 37.0 - 54.0 fl    MPV 10.2 6.0 - 12.0 fL    Platelets 250 140 - 450 10*3/mm3    Neutrophil % 51.5 42.7 - 76.0 %    Lymphocyte % 38.6 19.6 - 45.3 %    Monocyte % 7.1 5.0 - 12.0 %    Eosinophil % 2.5 0.3 - 6.2 %    Basophil % 0.3 0.0 - 1.5 %    Neutrophils, Absolute 3.06 1.70 - 7.00 10*3/mm3    Lymphocytes, Absolute 2.29 0.70 - 3.10 10*3/mm3    Monocytes, Absolute 0.42 0.10 - 0.90 10*3/mm3    Eosinophils, Absolute 0.15 0.00 - 0.40 10*3/mm3    Basophils, Absolute 0.02 0.00 - 0.20 10*3/mm3   Vitamin D 25 Hydroxy    Specimen: Blood   Result Value Ref Range    25 Hydroxy, Vitamin D 38.3 30.0 - 100.0 ng/ml   TSH    Specimen: Blood   Result Value Ref Range    TSH 1.520 0.270 - 4.200 uIU/mL   T4, Free    Specimen: Blood   Result Value Ref Range    Free T4 1.37 0.93 - 1.70 ng/dL   Vitamin B12    Specimen: Blood    Result Value Ref Range    Vitamin B-12 901 211 - 946 pg/mL   Lipid Panel    Specimen: Blood   Result Value Ref Range    Total Cholesterol 141 (L) 150 - 200 mg/dL    Triglycerides 97 <=150 mg/dL    HDL Cholesterol 79 (H) 40 - 59 mg/dL    LDL Cholesterol  43 <=100 mg/dL    VLDL Cholesterol 19.4 mg/dL    LDL/HDL Ratio 0.54 0.00 - 3.22   Comprehensive Metabolic Panel    Specimen: Blood   Result Value Ref Range    Glucose 67 (L) 70 - 99 mg/dL    BUN 8 7 - 23 mg/dL    Creatinine 0.82 0.52 - 1.04 mg/dL    Sodium 142 137 - 145 mmol/L    Potassium 4.0 3.4 - 5.0 mmol/L    Chloride 107 101 - 112 mmol/L    CO2 27.0 22.0 - 30.0 mmol/L    Calcium 10.0 8.4 - 10.2 mg/dL    Total Protein 7.3 6.3 - 8.6 g/dL    Albumin 4.50 3.50 - 5.00 g/dL    ALT (SGPT) 10 <=35 U/L    AST (SGOT) 18 14 - 36 U/L    Alkaline Phosphatase 41 38 - 126 U/L    Total Bilirubin 0.4 0.2 - 1.3 mg/dL    eGFR Non  Amer 87 71 - 165 mL/min/1.73    Globulin 2.8 2.3 - 3.5 gm/dL    A/G Ratio 1.6 1.1 - 1.8 g/dL    BUN/Creatinine Ratio 9.8 7.0 - 25.0    Anion Gap 8.0 5.0 - 15.0 mmol/L   Results for orders placed or performed in visit on 10/08/19   Liquid-based Pap Smear, Screening    Specimen: Cervix; ThinPrep Vial   Result Value Ref Range    Case Report       Gynecologic Cytology Report                       Case: [id number]                                  Authorizing Provider:  Tena Porras    Collected:           10/08/2019 02:00 PM                                 ANH Wynne                                                                  Ordering Location:     [facility]     Received:            10/08/2019 04:01 PM                                 [facility]                                                               First Screen:          Matilda Renee                                                              Pathologist:           Fabio Jacobs MD                                                           Specimen:     Liquid-Based Pap, Screening, Cervix                                                        Interpretation Negative for intraepithelial lesion or malignancy      General Categorization Within normal limits     Other Findings Reactive cellular changes     Specimen Adequacy       Satisfactory for evaluation, endocervical/transformation zone component present    Additional Information       Disclaimer: Cervical cytology is a screening test primarily for squamous cancer and its precursors and has associated false-negative and false-positive results.  Technologies such as liquid-based preparations may decrease but will not eliminate all false-negative results.  Follow-up of unexplained clinical signs and symptoms is recommended to minimize false-negative results. ([employer] System for Reporting Cervical Cytology: Beth, 2015).     Results for orders placed or performed during the hospital encounter of 07/23/19   Tissue Pathology Exam    Specimen: A: Small Intestine, Duodenum; Tissue    B: Gastric, Antrum; Tissue    C: Esophagus, Distal; Tissue   Result Value Ref Range    Case Report       Surgical Pathology Report                         Case: [id number]                                  Authorizing Provider:  Guicho Quiñonez MD        Collected:           07/23/2019 02:33 PM          Ordering Location:     [facility]             Received:            07/24/2019 06:48 AM                                 [facility]                                                     Pathologist:           Fabio Jacobs MD                                                           Specimens:   1) - Small Intestine, Duodenum, small bowel bx                                                      2) - Gastric, Antrum, antrum bx                                                                     3) - Esophagus, Distal, distal esophagus bx                                                Final Diagnosis       1.  SMALL BOWEL,  BIOPSY:  NO SIGNIFICANT HISTOLOGIC ABNORMALITY.    2.  GASTRIC ANTRUM, BIOPSY:  NO SIGNIFICANT HISTOLOGIC ABNORMALITY.    3.  DISTAL ESOPHAGUS, BIOPSY:  BENIGN SQUAMOUS MUCOSA.      Gross Description       1.  Two tan fragments measure 0.8 x 0.5 x 0.2 cm together.  Totally submitted.    2.  Two tan fragments measure 0.5 x 0.5 x 0.2 cm together.  Totally submitted.    3.  Two gray fragments measure 0.8 x 0.7 x 0.1 cm together.  Totally submitted.         Pregnancy, Urine -    Specimen: Urine   Result Value Ref Range    HCG, Urine QL Negative Negative   Results for orders placed or performed in visit on 06/14/19   Chlamydia trachomatis, Neisseria gonorrhoeae, Trichomonas vaginalis, PCR - Urine, Urine, Clean Catch    Specimen: Urine, Clean Catch   Result Value Ref Range    Chlamydia DNA by PCR Negative Negative    Neisseria gonorrhoeae by PCR Negative Negative    Trichomonas vaginalis PCR Negative    Results for orders placed or performed in visit on 05/07/19   Urinalysis With Culture If Indicated - Urine, Clean Catch    Specimen: Urine, Clean Catch   Result Value Ref Range    Color, UA Yellow Yellow, Straw    Appearance, UA Clear Clear    pH, UA 5.5 5.5 - 8.0    Specific Gravity, UA >=1.030 1.005 - 1.030    Glucose, UA Negative Negative    Ketones, UA Trace (A) Negative    Bilirubin, UA Negative Negative    Blood, UA Negative Negative    Protein, UA Negative Negative    Leuk Esterase, UA Negative Negative    Nitrite, UA Negative Negative    Urobilinogen, UA 1.0 E.U./dL 0.2 - 1.0 E.U./dL   CBC Auto Differential    Specimen: Blood   Result Value Ref Range    WBC 6.71 3.40 - 10.80 10*3/mm3    RBC 4.99 3.77 - 5.28 10*6/mm3    Hemoglobin 14.5 12.0 - 15.9 g/dL    Hematocrit 44.6 34.0 - 46.6 %    MCV 89.4 79.0 - 97.0 fL    MCH 29.1 26.6 - 33.0 pg    MCHC 32.5 31.5 - 35.7 g/dL    RDW 14.4 12.3 - 15.4 %    RDW-SD 45.8 37.0 - 54.0 fl    MPV 10.4 6.0 - 12.0 fL    Platelets 261 140 - 450 10*3/mm3    Neutrophil % 41.6 (L) 42.7 -  76.0 %    Lymphocyte % 49.0 (H) 19.6 - 45.3 %    Monocyte % 7.0 5.0 - 12.0 %    Eosinophil % 2.1 0.3 - 6.2 %    Basophil % 0.3 0.0 - 1.5 %    Neutrophils, Absolute 2.79 1.70 - 7.00 10*3/mm3    Lymphocytes, Absolute 3.29 (H) 0.70 - 3.10 10*3/mm3    Monocytes, Absolute 0.47 0.10 - 0.90 10*3/mm3    Eosinophils, Absolute 0.14 0.00 - 0.40 10*3/mm3    Basophils, Absolute 0.02 0.00 - 0.20 10*3/mm3     *Note: Due to a large number of results and/or encounters for the requested time period, some results have not been displayed. A complete set of results can be found in Results Review.

## 2022-02-21 NOTE — PATIENT INSTRUCTIONS
MyPlate from USDA    MyPlate is an outline of a general healthy diet based on the 2010 Dietary Guidelines for Americans, from the U.S. Department of Agriculture (USDA). It sets guidelines for how much food you should eat from each food group based on your age, sex, and level of physical activity.  What are tips for following MyPlate?  To follow MyPlate recommendations:  · Eat a wide variety of fruits and vegetables, grains, and protein foods.  · Serve smaller portions and eat less food throughout the day.  · Limit portion sizes to avoid overeating.  · Enjoy your food.  · Get at least 150 minutes of exercise every week. This is about 30 minutes each day, 5 or more days per week.  It can be difficult to have every meal look like MyPlate. Think about MyPlate as eating guidelines for an entire day, rather than each individual meal.  Fruits and vegetables  · Make half of your plate fruits and vegetables.  · Eat many different colors of fruits and vegetables each day.  · For a 2,000 calorie daily food plan, eat:  ? 2½ cups of vegetables every day.  ? 2 cups of fruit every day.  · 1 cup is equal to:  ? 1 cup raw or cooked vegetables.  ? 1 cup raw fruit.  ? 1 medium-sized orange, apple, or banana.  ? 1 cup 100% fruit or vegetable juice.  ? 2 cups raw leafy greens, such as lettuce, spinach, or kale.  ? ½ cup dried fruit.  Grains  · One fourth of your plate should be grains.  · Make at least half of the grains you eat each day whole grains.  · For a 2,000 calorie daily food plan, eat 6 oz of grains every day.  · 1 oz is equal to:  ? 1 slice bread.  ? 1 cup cereal.  ? ½ cup cooked rice, cereal, or pasta.  Protein  · One fourth of your plate should be protein.  · Eat a wide variety of protein foods, including meat, poultry, fish, eggs, beans, nuts, and tofu.  · For a 2,000 calorie daily food plan, eat 5½ oz of protein every day.  · 1 oz is equal to:  ? 1 oz meat, poultry, or fish.  ? ¼ cup cooked beans.  ? 1 egg.  ? ½ oz nuts  or seeds.  ? 1 Tbsp peanut butter.  Dairy  · Drink fat-free or low-fat (1%) milk.  · Eat or drink dairy as a side to meals.  · For a 2,000 calorie daily food plan, eat or drink 3 cups of dairy every day.  · 1 cup is equal to:  ? 1 cup milk, yogurt, cottage cheese, or soy milk (soy beverage).  ? 2 oz processed cheese.  ? 1½ oz natural cheese.  Fats, oils, salt, and sugars  · Only small amounts of oils are recommended.  · Avoid foods that are high in calories and low in nutritional value (empty calories), like foods high in fat or added sugars.  · Choose foods that are low in salt (sodium). Choose foods that have less than 140 milligrams (mg) of sodium per serving.  · Drink water instead of sugary drinks. Drink enough water each day to keep your urine pale yellow.  Where to find support  · Work with your health care provider or a nutrition specialist (dietitian) to develop a customized eating plan that is right for you.  · Download an richard (mobile application) to help you track your daily food intake.  Where to find more information  · Go to ChooseMyPlate.gov for more information.  Summary  · MyPlate is a general guideline for healthy eating from the USDA. It is based on the 2010 Dietary Guidelines for Americans.  · In general, fruits and vegetables should take up ½ of your plate, grains should take up ¼ of your plate, and protein should take up ¼ of your plate.  This information is not intended to replace advice given to you by your health care provider. Make sure you discuss any questions you have with your health care provider.  Document Revised: 05/21/2020 Document Reviewed: 03/19/2018  Elsevier Patient Education © 2021 [employer]

## 2022-02-23 ENCOUNTER — OFFICE VISIT (OUTPATIENT)
Dept: OBSTETRICS AND GYNECOLOGY | Facility: CLINIC | Age: 22
End: 2022-02-23

## 2022-02-23 VITALS
DIASTOLIC BLOOD PRESSURE: 58 MMHG | BODY MASS INDEX: 21.13 KG/M2 | SYSTOLIC BLOOD PRESSURE: 102 MMHG | HEIGHT: 65 IN | WEIGHT: 126.8 LBS | HEART RATE: 74 BPM

## 2022-02-23 DIAGNOSIS — Z11.59 NEED FOR HEPATITIS B SCREENING TEST: ICD-10-CM

## 2022-02-23 DIAGNOSIS — N90.89 VULVAR LESION: Primary | ICD-10-CM

## 2022-02-23 DIAGNOSIS — Z11.59 NEED FOR HEPATITIS C SCREENING TEST: ICD-10-CM

## 2022-02-23 PROCEDURE — 99213 OFFICE O/P EST LOW 20 MIN: CPT | Performed by: NURSE PRACTITIONER

## 2022-02-25 ENCOUNTER — LAB (OUTPATIENT)
Dept: LAB | Facility: OTHER | Age: 22
End: 2022-02-25

## 2022-02-25 DIAGNOSIS — Z11.59 NEED FOR HEPATITIS C SCREENING TEST: ICD-10-CM

## 2022-02-25 DIAGNOSIS — Z11.59 NEED FOR HEPATITIS B SCREENING TEST: ICD-10-CM

## 2022-02-25 PROCEDURE — 80074 ACUTE HEPATITIS PANEL: CPT | Performed by: NURSE PRACTITIONER

## 2022-02-25 NOTE — PROGRESS NOTES
"Subjective   Mandi Walters is a 22 y.o. female.     HPI   Pt presents with concerns for a knot on the vulva around the clitoris. Noticed a couple of days ago. It does not itch and only hurts if manipulated. Hasn't drained. Seems to not be superficial. She did squeeze the area a month ago when she presented last time from \"white bumps\" and caused it to bleed and become uncomfortable. Under this exact area is where she feels a knot.     The following portions of the patient's history were reviewed and updated as appropriate: allergies, current medications, past family history, past medical history, past social history, past surgical history and problem list.    Review of Systems   Constitutional: Negative.  Negative for chills and fever.   Genitourinary: Positive for genital sores (bump). Negative for dysuria, pelvic pain, vaginal bleeding, vaginal discharge and vaginal pain.   Psychiatric/Behavioral: Negative.  Negative for depressed mood. The patient is not nervous/anxious.        Objective   Physical Exam  Vitals reviewed. Exam conducted with a chaperone present.   Constitutional:       Appearance: Normal appearance.   Pulmonary:      Effort: Pulmonary effort is normal.   Genitourinary:     Labia:         Right: No rash, tenderness, lesion or injury.         Left: No rash, tenderness, lesion or injury.       Vagina: Normal. No signs of injury and foreign body. No vaginal discharge, erythema, tenderness or bleeding.       Neurological:      Mental Status: She is alert and oriented to person, place, and time.           Assessment/Plan   Diagnoses and all orders for this visit:    1. Vulvar lesion (Primary)    2. Need for hepatitis B screening test  -     Hepatitis Panel, Acute; Future    3. Need for hepatitis C screening test  -     Hepatitis Panel, Acute; Future       Reassured pt that the areas appear to be benign, likely feels a little different to her from the healing process where she previously squeezed " the area. Recommended she avoid manipulation. Keep the area clean with dove soap. RTC if it enlarges and/or becomes painful and irritated. Pt voices understanding.     She had a pap smear with me 2/19/2021 but also states Dr. Taylor did one in Oct 2021. We confirmed that she did indeed have a complete WWE and pap test in Oct there. She can RTC here or to him in Oct 2022 for WWE or sooner PRN.     Pt is requesting Hepatitis B and C screening. She is seeing that it is due based on MyChart recommendations. I do not believe she has ever been tested based on records. We will order today to complete at the lab.

## 2022-02-26 LAB
HAV IGM SERPL QL IA: NORMAL
HBV CORE IGM SERPL QL IA: NORMAL
HBV SURFACE AG SERPL QL IA: NORMAL
HCV AB SER DONR QL: NORMAL

## 2022-03-14 ENCOUNTER — LAB (OUTPATIENT)
Dept: LAB | Facility: HOSPITAL | Age: 22
End: 2022-03-14

## 2022-03-17 RX ORDER — PANTOPRAZOLE SODIUM 40 MG/1
40 TABLET, DELAYED RELEASE ORAL DAILY
Qty: 30 TABLET | Refills: 5 | OUTPATIENT
Start: 2022-03-17

## 2022-04-13 RX ORDER — PANTOPRAZOLE SODIUM 40 MG/1
40 TABLET, DELAYED RELEASE ORAL DAILY
Qty: 30 TABLET | Refills: 5 | OUTPATIENT
Start: 2022-04-13

## 2022-04-13 RX ORDER — PROMETHAZINE HYDROCHLORIDE 25 MG/1
25 TABLET ORAL EVERY 6 HOURS PRN
Qty: 30 TABLET | Refills: 5 | OUTPATIENT
Start: 2022-04-13

## 2022-05-09 RX ORDER — PROMETHAZINE HYDROCHLORIDE 25 MG/1
TABLET ORAL
Qty: 30 TABLET | Refills: 5 | OUTPATIENT
Start: 2022-05-09

## 2022-07-07 RX ORDER — PANTOPRAZOLE SODIUM 40 MG/1
40 TABLET, DELAYED RELEASE ORAL DAILY
Qty: 30 TABLET | Refills: 5 | Status: SHIPPED | OUTPATIENT
Start: 2022-07-07 | End: 2022-11-18 | Stop reason: SDUPTHER

## 2022-07-07 RX ORDER — ONDANSETRON 4 MG/1
4 TABLET, FILM COATED ORAL EVERY 8 HOURS PRN
Qty: 30 TABLET | Refills: 0 | Status: SHIPPED | OUTPATIENT
Start: 2022-07-07 | End: 2023-02-01

## 2022-09-01 RX ORDER — LANOLIN ALCOHOL/MO/W.PET/CERES
1000 CREAM (GRAM) TOPICAL DAILY
COMMUNITY
End: 2023-02-01

## 2022-09-01 RX ORDER — SIMETHICONE 125 MG
125 TABLET,CHEWABLE ORAL EVERY 6 HOURS PRN
COMMUNITY
End: 2023-02-01

## 2022-09-06 ENCOUNTER — ANESTHESIA (OUTPATIENT)
Dept: GASTROENTEROLOGY | Facility: HOSPITAL | Age: 22
End: 2022-09-06

## 2022-09-06 ENCOUNTER — ANESTHESIA EVENT (OUTPATIENT)
Dept: GASTROENTEROLOGY | Facility: HOSPITAL | Age: 22
End: 2022-09-06

## 2022-09-06 ENCOUNTER — HOSPITAL ENCOUNTER (OUTPATIENT)
Facility: HOSPITAL | Age: 22
Setting detail: HOSPITAL OUTPATIENT SURGERY
Discharge: HOME | End: 2022-09-06
Attending: INTERNAL MEDICINE | Admitting: INTERNAL MEDICINE

## 2022-09-06 VITALS
WEIGHT: 120 LBS | BODY MASS INDEX: 19.99 KG/M2 | RESPIRATION RATE: 18 BRPM | HEART RATE: 74 BPM | HEIGHT: 65 IN | DIASTOLIC BLOOD PRESSURE: 54 MMHG | SYSTOLIC BLOOD PRESSURE: 96 MMHG | TEMPERATURE: 97.5 F | OXYGEN SATURATION: 100 %

## 2022-09-06 DIAGNOSIS — R19.4 CHANGE IN BOWEL HABITS: ICD-10-CM

## 2022-09-06 DIAGNOSIS — R19.7 DIARRHEA, UNSPECIFIED TYPE: ICD-10-CM

## 2022-09-06 DIAGNOSIS — K21.00 GASTROESOPHAGEAL REFLUX DISEASE WITH ESOPHAGITIS WITHOUT HEMORRHAGE: ICD-10-CM

## 2022-09-06 DIAGNOSIS — R10.84 GENERALIZED ABDOMINAL PAIN: ICD-10-CM

## 2022-09-06 PROCEDURE — 43239 EGD BIOPSY SINGLE/MULTIPLE: CPT | Performed by: INTERNAL MEDICINE

## 2022-09-06 PROCEDURE — 25010000002 PROPOFOL 10 MG/ML EMULSION

## 2022-09-06 RX ORDER — DEXTROSE AND SODIUM CHLORIDE 5; .45 G/100ML; G/100ML
30 INJECTION, SOLUTION INTRAVENOUS CONTINUOUS PRN
Status: DISCONTINUED | OUTPATIENT
Start: 2022-09-06 | End: 2022-09-06 | Stop reason: HOSPADM

## 2022-09-06 RX ORDER — LIDOCAINE HYDROCHLORIDE 20 MG/ML
INJECTION, SOLUTION INTRAVENOUS AS NEEDED
Status: DISCONTINUED | OUTPATIENT
Start: 2022-09-06 | End: 2022-09-06 | Stop reason: SURG

## 2022-09-06 RX ORDER — PROPOFOL 10 MG/ML
VIAL (ML) INTRAVENOUS AS NEEDED
Status: DISCONTINUED | OUTPATIENT
Start: 2022-09-06 | End: 2022-09-06 | Stop reason: SURG

## 2022-09-06 RX ADMIN — PROPOFOL 90 MG: 10 INJECTION, EMULSION INTRAVENOUS at 16:03

## 2022-09-06 RX ADMIN — DEXTROSE AND SODIUM CHLORIDE 30 ML/HR: 5; 450 INJECTION, SOLUTION INTRAVENOUS at 15:27

## 2022-09-06 RX ADMIN — PROPOFOL 60 MG: 10 INJECTION, EMULSION INTRAVENOUS at 16:02

## 2022-09-06 RX ADMIN — LIDOCAINE HYDROCHLORIDE 60 MG: 20 INJECTION, SOLUTION INTRAVENOUS at 16:02

## 2022-09-06 NOTE — H&P
No chief complaint on file.      Subjective    Mandi Walters is a 22 y.o. female. she is here today for follow-up.  I agree with the current note with no changes in the history.  22-year-old female presents for 6-month recheck regarding heartburn and nausea.  Reports recently she has noted more abdominal burning associated with change in bowel habits and diarrhea denies any melena or hematochezia.  States when the pain hits the degree and seems to improve symptoms discussed importance of only taking Benadryl as needed and states she takes it about every night.  In the past prescribed Bentyl but states she could not tell a difference would not continue it.  Previously she has had issues with constipation and takes Colace when it becomes a problem.  EGD in 2019 noted esophagitis gastritis normal duodenum.  Patient states she has recently changed PCP a lot like it updated in her chart.    Heartburn  She complains of abdominal pain, heartburn and nausea. She reports no belching, no chest pain, no choking, no coughing, no dysphagia, no early satiety, no globus sensation or no hoarse voice. This is a chronic problem. The current episode started more than 1 year ago. The problem occurs frequently. The problem has been waxing and waning. The heartburn duration is several minutes. The symptoms are aggravated by certain foods. Associated symptoms include fatigue.   Nausea  Associated symptoms include abdominal pain, fatigue and nausea. Pertinent negatives include no chest pain, chills, coughing, diaphoresis, fever or vomiting.     Plan; schedule patient for EGD due to worsening reflux symptoms obtain biopsy rule out H. pylori gastritis or Peterson's esophagus.  Schedule patient for colonoscopy due to lower abdominal pain diarrhea and will obtain biopsy to rule out inflammatory bowel disease       The following portions of the patient's history were reviewed and updated as appropriate:   Past Medical History:    Diagnosis Date   • Abdominal pain    • Bacterial vaginosis    • Chlamydia    • Costal chondritis    • Fatigue    • GERD (gastroesophageal reflux disease)    • Gonorrhea    • Hypoglycemia    • Malaise and fatigue    • Menorrhagia    • Well child visit      Past Surgical History:   Procedure Laterality Date   • ENDOSCOPY N/A 2019    Procedure: ESOPHAGOGASTRODUODENOSCOPY possible dilation;  Surgeon: Guicho Quiñonez MD;  Location: [facility] ENDOSCOPY;  Service: Gastroenterology   • TUBAL ABDOMINAL LIGATION  2020   • TYMPANOSTOMY TUBE PLACEMENT      Tympanostomy tube-2 (1)      Family History   Problem Relation Age of Onset   • Osteoarthritis Mother    • Diabetes Maternal Grandmother    • Heart disease Maternal Grandmother    • Osteoarthritis Maternal Grandmother    • Coronary artery disease Paternal Grandfather      OB History        1    Para        Term                AB   1    Living           SAB   1    IAB        Ectopic        Molar        Multiple        Live Births                  Prior to Admission medications    Medication Sig Start Date End Date Taking? Authorizing Provider    MG capsule TAKE 1 CAPSULE BY MOUTH TWICE DAILY AS NEEDED FOR CONSTIPATION 2/15/22  Yes Sherrell Bull APRN   pantoprazole (PROTONIX) 40 MG EC tablet Take 1 tablet by mouth Daily. 21  Yes Sherrell Bull APRN   promethazine (PHENERGAN) 25 MG tablet Take 1 tablet by mouth Every 6 (Six) Hours As Needed for Nausea or Vomiting. 21  Yes Sherrell Bull APRN   famotidine (PEPCID) 40 MG tablet TAKE 1 TABLET BY MOUTH AT NIGHT AS NEEDED FOR HEARTBURN 11/15/21 2/21/22  Sherrell Bull APRN     Allergies   Allergen Reactions   • Amoxicillin Anaphylaxis     Anaphylaxis   • Loratadine Unknown - High Severity     Unknown   • Penicillins Anaphylaxis   • Codeine Rash     Social History     Socioeconomic History   • Marital status: Single   Tobacco Use   • Smoking status: Former Smoker     Packs/day: 0.25     Years:  "7.00     Pack years: 1.75     Quit date:      Years since quittin.6   • Smokeless tobacco: Never Used   Substance and Sexual Activity   • Alcohol use: No   • Drug use: No   • Sexual activity: Yes     Partners: Male     Birth control/protection: Tubal ligation       Review of Systems  Review of Systems   Constitutional: Positive for fatigue. Negative for activity change, appetite change, chills, diaphoresis, fever and unexpected weight change.   HENT: Negative for hoarse voice and trouble swallowing.    Respiratory: Negative for cough and choking.    Cardiovascular: Negative for chest pain.   Gastrointestinal: Positive for abdominal pain, heartburn and nausea. Negative for abdominal distention, anal bleeding, blood in stool, constipation, diarrhea, dysphagia, rectal pain and vomiting.        Ht 167.6 cm (66\")   Wt 56.7 kg (125 lb)   LMP 2022 Comment: tubal ligation  BMI 20.18 kg/m²      BP 96/54 (BP Location: Right arm, Patient Position: Lying)   Pulse 74   Temp 97.5 °F (36.4 °C)   Resp 18   Ht 165.1 cm (65\")   Wt 54.4 kg (120 lb)   LMP 2022 Comment: tubal ligation  SpO2 100%   BMI 19.97 kg/m²       Objective    Physical Exam  Constitutional:       General: She is not in acute distress.     Appearance: Normal appearance. She is normal weight. She is not ill-appearing.   HENT:      Head: Normocephalic and atraumatic.   Abdominal:      General: Abdomen is flat. Bowel sounds are normal. There is distension (mild ).      Palpations: Abdomen is soft. There is no mass.      Tenderness: There is abdominal tenderness in the right lower quadrant, periumbilical area and suprapubic area.   Neurological:      Mental Status: She is alert.       Office Visit on 2021   Component Date Value Ref Range Status   • Case Report 2021    Final                    Value:Gynecologic Cytology Report                       Case: [id number]                                  Authorizing Provider:  " Tena Porras    Collected:           02/19/2021 10:06 AM                                 AN[facility] Wynne                                                                  Ordering Location:     [facility]     Received:            02/19/2021 10:24 AM                                 [facility]                                                                 First Screen:          Allie Nasrarambo McdonoughSanjay                                                               Specimen:    Sendout to [facility], Cervix                                                                    • Interpretation 02/19/2021 See attached report   Final     Assessment & Plan      1. Generalized abdominal pain    2. Change in bowel habits    3. Diarrhea, unspecified type    4. Gastroesophageal reflux disease with esophagitis without hemorrhage    .       Orders placed during this encounter include:  Orders Placed This Encounter   Procedures   • Pregnancy, Urine -     If child bearing age and not had hysterectomy or tubal ligation, may obtain serum if unable to void     Standing Status:   Standing     Number of Occurrences:   1     Order Specific Question:   Release to patient     Answer:   Immediate   • Obtain Informed Consent     Standing Status:   Standing     Number of Occurrences:   1     Order Specific Question:   Informed Consent Given For     Answer:   ESOPHAGOGASTRODUODENOSCOPY   • POC Glucose Once     Prior to Procedure on ALL Diabetic Patients     Standing Status:   Standing     Number of Occurrences:   1     Order Specific Question:   Release to patient     Answer:   Immediate   • Insert Peripheral IV     Standing Status:   Standing     Number of Occurrences:   1       ESOPHAGOGASTRODUODENOSCOPY (N/A)    Review and/or summary of lab tests, radiology, procedures, medications. Review and summary of old records and obtaining of history. The risks and benefits of my recommendations, as well as other treatment options were discussed  with the patient today. Questions were answered.    New Medications Ordered This Visit   Medications   • dextrose 5 % and sodium chloride 0.45 % infusion       Follow-up: No follow-ups on file.          This document has been electronically signed by Guicho Quiñonez MD on September 6, 2022 15:05 CDT           I spent 16 minutes caring for Mandi on this date of service. This time includes time spent by me in the following activities:preparing for the visit, reviewing tests, obtaining and/or reviewing a separately obtained history, performing a medically appropriate examination and/or evaluation , counseling and educating the patient/family/caregiver, ordering medications, tests, or procedures, referring and communicating with other health care professionals , documenting information in the medical record and care coordination    Results for orders placed or performed in visit on 02/25/22   Hepatitis Panel, Acute    Specimen: Blood   Result Value Ref Range    Hepatitis B Surface Ag Non-Reactive Non-Reactive    Hep A IgM Non-Reactive Non-Reactive    Hep B C IgM Non-Reactive Non-Reactive    Hepatitis C Ab Non-Reactive Non-Reactive   Results for orders placed or performed in visit on 02/19/21   Liquid-based Pap Smear, Screening    Specimen: Cervix; ThinPrep Vial   Result Value Ref Range    Case Report       Gynecologic Cytology Report                       Case: [id number]                                  Authorizing Provider:  Tena Porras    Collected:           02/19/2021 10:06 AM                                 AN[facility] Wynne                                                                  Ordering Location:     [facility]     Received:            02/19/2021 10:24 AM                                 [facility]                                                                 First Screen:          Nasra Kelley                                                               Specimen:    Sendout to  P&C, Cervix                                                                     Interpretation See attached report    Results for orders placed or performed in visit on 11/06/20   POC Pregnancy, Urine    Specimen: Urine   Result Value Ref Range    HCG, Urine, QL Negative Negative    Lot Number [id number]     Internal Positive Control Positive     Internal Negative Control Negative    Results for orders placed or performed in visit on 05/14/20   CBC Auto Differential    Specimen: Blood   Result Value Ref Range    WBC 5.94 3.40 - 10.80 10*3/mm3    RBC 4.98 3.77 - 5.28 10*6/mm3    Hemoglobin 14.4 12.0 - 15.9 g/dL    Hematocrit 45.9 34.0 - 46.6 %    MCV 92.2 79.0 - 97.0 fL    MCH 28.9 26.6 - 33.0 pg    MCHC 31.4 (L) 31.5 - 35.7 g/dL    RDW 14.7 12.3 - 15.4 %    RDW-SD 48.0 37.0 - 54.0 fl    MPV 10.2 6.0 - 12.0 fL    Platelets 250 140 - 450 10*3/mm3    Neutrophil % 51.5 42.7 - 76.0 %    Lymphocyte % 38.6 19.6 - 45.3 %    Monocyte % 7.1 5.0 - 12.0 %    Eosinophil % 2.5 0.3 - 6.2 %    Basophil % 0.3 0.0 - 1.5 %    Neutrophils, Absolute 3.06 1.70 - 7.00 10*3/mm3    Lymphocytes, Absolute 2.29 0.70 - 3.10 10*3/mm3    Monocytes, Absolute 0.42 0.10 - 0.90 10*3/mm3    Eosinophils, Absolute 0.15 0.00 - 0.40 10*3/mm3    Basophils, Absolute 0.02 0.00 - 0.20 10*3/mm3   Vitamin D 25 Hydroxy    Specimen: Blood   Result Value Ref Range    25 Hydroxy, Vitamin D 38.3 30.0 - 100.0 ng/ml   TSH    Specimen: Blood   Result Value Ref Range    TSH 1.520 0.270 - 4.200 uIU/mL   T4, Free    Specimen: Blood   Result Value Ref Range    Free T4 1.37 0.93 - 1.70 ng/dL   Vitamin B12    Specimen: Blood   Result Value Ref Range    Vitamin B-12 901 211 - 946 pg/mL   Lipid Panel    Specimen: Blood   Result Value Ref Range    Total Cholesterol 141 (L) 150 - 200 mg/dL    Triglycerides 97 <=150 mg/dL    HDL Cholesterol 79 (H) 40 - 59 mg/dL    LDL Cholesterol  43 <=100 mg/dL    VLDL Cholesterol 19.4 mg/dL    LDL/HDL Ratio 0.54 0.00 - 3.22   Comprehensive  Metabolic Panel    Specimen: Blood   Result Value Ref Range    Glucose 67 (L) 70 - 99 mg/dL    BUN 8 7 - 23 mg/dL    Creatinine 0.82 0.52 - 1.04 mg/dL    Sodium 142 137 - 145 mmol/L    Potassium 4.0 3.4 - 5.0 mmol/L    Chloride 107 101 - 112 mmol/L    CO2 27.0 22.0 - 30.0 mmol/L    Calcium 10.0 8.4 - 10.2 mg/dL    Total Protein 7.3 6.3 - 8.6 g/dL    Albumin 4.50 3.50 - 5.00 g/dL    ALT (SGPT) 10 <=35 U/L    AST (SGOT) 18 14 - 36 U/L    Alkaline Phosphatase 41 38 - 126 U/L    Total Bilirubin 0.4 0.2 - 1.3 mg/dL    eGFR Non  Amer 87 71 - 165 mL/min/1.73    Globulin 2.8 2.3 - 3.5 gm/dL    A/G Ratio 1.6 1.1 - 1.8 g/dL    BUN/Creatinine Ratio 9.8 7.0 - 25.0    Anion Gap 8.0 5.0 - 15.0 mmol/L   Results for orders placed or performed in visit on 10/08/19   Liquid-based Pap Smear, Screening    Specimen: Cervix; ThinPrep Vial   Result Value Ref Range    Case Report       Gynecologic Cytology Report                       Case: [id number]                                  Authorizing Provider:  Tena Porras    Collected:           10/08/2019 02:00 PM                                 AN[facility] Wynne                                                                  Ordering Location:     [facility]     Received:            10/08/2019 04:01 PM                                 [facility]                                                               First Screen:          Matilda Renee                                                              Pathologist:           Fabio Jacobs MD                                                           Specimen:    Liquid-Based Pap, Screening, Cervix                                                        Interpretation Negative for intraepithelial lesion or malignancy      General Categorization Within normal limits     Other Findings Reactive cellular changes     Specimen Adequacy       Satisfactory for evaluation, endocervical/transformation zone component  present    Additional Information       Disclaimer: Cervical cytology is a screening test primarily for squamous cancer and its precursors and has associated false-negative and false-positive results.  Technologies such as liquid-based preparations may decrease but will not eliminate all false-negative results.  Follow-up of unexplained clinical signs and symptoms is recommended to minimize false-negative results. (The [employer] System for Reporting Cervical Cytology: Beth, 2015).     Results for orders placed or performed during the hospital encounter of 07/23/19   Tissue Pathology Exam    Specimen: A: Small Intestine, Duodenum; Tissue    B: Gastric, Antrum; Tissue    C: Esophagus, Distal; Tissue   Result Value Ref Range    Case Report       Surgical Pathology Report                         Case: [id number]                                  Authorizing Provider:  Guicho Quiñonez MD        Collected:           07/23/2019 02:33 PM          Ordering Location:     [facility]             Received:            07/24/2019 06:48 AM                                 [facility]                                                     Pathologist:           Fabio Jacobs MD                                                           Specimens:   1) - Small Intestine, Duodenum, small bowel bx                                                      2) - Gastric, Antrum, antrum bx                                                                     3) - Esophagus, Distal, distal esophagus bx                                                Final Diagnosis       1.  SMALL BOWEL, BIOPSY:  NO SIGNIFICANT HISTOLOGIC ABNORMALITY.    2.  GASTRIC ANTRUM, BIOPSY:  NO SIGNIFICANT HISTOLOGIC ABNORMALITY.    3.  DISTAL ESOPHAGUS, BIOPSY:  BENIGN SQUAMOUS MUCOSA.      Gross Description       1.  Two tan fragments measure 0.8 x 0.5 x 0.2 cm together.  Totally submitted.    2.  Two tan fragments measure 0.5 x 0.5 x 0.2 cm together.   Totally submitted.    3.  Two gray fragments measure 0.8 x 0.7 x 0.1 cm together.  Totally submitted.         Pregnancy, Urine -    Specimen: Urine   Result Value Ref Range    HCG, Urine QL Negative Negative   Results for orders placed or performed in visit on 06/14/19   Chlamydia trachomatis, Neisseria gonorrhoeae, Trichomonas vaginalis, PCR - Urine, Urine, Clean Catch    Specimen: Urine, Clean Catch   Result Value Ref Range    Chlamydia DNA by PCR Negative Negative    Neisseria gonorrhoeae by PCR Negative Negative    Trichomonas vaginalis PCR Negative    Results for orders placed or performed in visit on 05/07/19   Urinalysis With Culture If Indicated - Urine, Clean Catch    Specimen: Urine, Clean Catch   Result Value Ref Range    Color, UA Yellow Yellow, Straw    Appearance, UA Clear Clear    pH, UA 5.5 5.5 - 8.0    Specific Gravity, UA >=1.030 1.005 - 1.030    Glucose, UA Negative Negative    Ketones, UA Trace (A) Negative    Bilirubin, UA Negative Negative    Blood, UA Negative Negative    Protein, UA Negative Negative    Leuk Esterase, UA Negative Negative    Nitrite, UA Negative Negative    Urobilinogen, UA 1.0 E.U./dL 0.2 - 1.0 E.U./dL   CBC Auto Differential    Specimen: Blood   Result Value Ref Range    WBC 6.71 3.40 - 10.80 10*3/mm3    RBC 4.99 3.77 - 5.28 10*6/mm3    Hemoglobin 14.5 12.0 - 15.9 g/dL    Hematocrit 44.6 34.0 - 46.6 %    MCV 89.4 79.0 - 97.0 fL    MCH 29.1 26.6 - 33.0 pg    MCHC 32.5 31.5 - 35.7 g/dL    RDW 14.4 12.3 - 15.4 %    RDW-SD 45.8 37.0 - 54.0 fl    MPV 10.4 6.0 - 12.0 fL    Platelets 261 140 - 450 10*3/mm3    Neutrophil % 41.6 (L) 42.7 - 76.0 %    Lymphocyte % 49.0 (H) 19.6 - 45.3 %    Monocyte % 7.0 5.0 - 12.0 %    Eosinophil % 2.1 0.3 - 6.2 %    Basophil % 0.3 0.0 - 1.5 %    Neutrophils, Absolute 2.79 1.70 - 7.00 10*3/mm3    Lymphocytes, Absolute 3.29 (H) 0.70 - 3.10 10*3/mm3    Monocytes, Absolute 0.47 0.10 - 0.90 10*3/mm3    Eosinophils, Absolute 0.14 0.00 - 0.40 10*3/mm3     Basophils, Absolute 0.02 0.00 - 0.20 10*3/mm3     *Note: Due to a large number of results and/or encounters for the requested time period, some results have not been displayed. A complete set of results can be found in Results Review.

## 2022-09-06 NOTE — ANESTHESIA POSTPROCEDURE EVALUATION
Patient: Mandi Walters    Procedure Summary     Date: 09/06/22 Room / Location: [facility] ENDOSCOPY 1 / [facility] ENDOSCOPY    Anesthesia Start: 1601 Anesthesia Stop: 1607    Procedure: ESOPHAGOGASTRODUODENOSCOPY (N/A ) Diagnosis:       Gastroesophageal reflux disease with esophagitis without hemorrhage      Generalized abdominal pain      Change in bowel habits      Diarrhea, unspecified type      (Gastroesophageal reflux disease with esophagitis without hemorrhage [K21.00])      (Generalized abdominal pain [R10.84])      (Change in bowel habits [R19.4])      (Diarrhea, unspecified type [R19.7])    Surgeons: Guicho Quiñonez MD Provider: Collins Gray CRNA    Anesthesia Type: MAC ASA Status: 2          Anesthesia Type: MAC    Vitals  No vitals data found for the desired time range.          Post Anesthesia Care and Evaluation    Patient location during evaluation: bedside  Patient participation: complete - patient cannot participate  Level of consciousness: sleepy but conscious  Pain score: 0  Pain management: adequate    Airway patency: patent  Anesthetic complications: No anesthetic complications  PONV Status: none  Cardiovascular status: acceptable  Respiratory status: acceptable  Hydration status: acceptable  No anesthesia care post op

## 2022-09-06 NOTE — ANESTHESIA PREPROCEDURE EVALUATION
Anesthesia Evaluation     Patient summary reviewed and Nursing notes reviewed   NPO Solid Status: > 8 hours  NPO Liquid Status: > 2 hours           Airway   Mallampati: II  TM distance: >3 FB  Neck ROM: full  No difficulty expected  Dental - normal exam     Pulmonary - normal exam   (+) a smoker Current Smoked day of surgery,   Cardiovascular - normal exam  Exercise tolerance: excellent (>7 METS)        Neuro/Psych- negative ROS  GI/Hepatic/Renal/Endo    (+)  GERD well controlled,  renal disease stones,     Musculoskeletal (-) negative ROS    Abdominal  - normal exam   Substance History - negative use     OB/GYN negative ob/gyn ROS         Other - negative ROS                       Anesthesia Plan    ASA 2     MAC     intravenous induction     Anesthetic plan, risks, benefits, and alternatives have been provided, discussed and informed consent has been obtained with: patient.        CODE STATUS:

## 2022-09-08 LAB — REF LAB TEST METHOD: NORMAL

## 2022-09-14 ENCOUNTER — OFFICE VISIT (OUTPATIENT)
Dept: GASTROENTEROLOGY | Facility: CLINIC | Age: 22
End: 2022-09-14

## 2022-09-14 VITALS
HEART RATE: 93 BPM | BODY MASS INDEX: 20.49 KG/M2 | HEIGHT: 65 IN | DIASTOLIC BLOOD PRESSURE: 81 MMHG | WEIGHT: 123 LBS | SYSTOLIC BLOOD PRESSURE: 154 MMHG

## 2022-09-14 DIAGNOSIS — K21.00 GASTROESOPHAGEAL REFLUX DISEASE WITH ESOPHAGITIS WITHOUT HEMORRHAGE: Primary | ICD-10-CM

## 2022-09-14 DIAGNOSIS — K59.04 CHRONIC IDIOPATHIC CONSTIPATION: ICD-10-CM

## 2022-09-14 PROBLEM — M54.6 ACUTE MIDLINE THORACIC BACK PAIN: Status: ACTIVE | Noted: 2022-04-07

## 2022-09-14 PROBLEM — I49.1 PAC (PREMATURE ATRIAL CONTRACTION): Status: ACTIVE | Noted: 2022-07-12

## 2022-09-14 PROBLEM — R55 EPISODE OF SYNCOPE: Status: ACTIVE | Noted: 2022-08-17

## 2022-09-14 PROBLEM — M72.2 PLANTAR FASCIITIS: Status: ACTIVE | Noted: 2022-04-07

## 2022-09-14 PROCEDURE — 99213 OFFICE O/P EST LOW 20 MIN: CPT | Performed by: NURSE PRACTITIONER

## 2022-09-14 RX ORDER — PROMETHAZINE HYDROCHLORIDE 25 MG/1
25 TABLET ORAL EVERY 6 HOURS PRN
Qty: 30 TABLET | Refills: 0 | Status: SHIPPED | OUTPATIENT
Start: 2022-09-14 | End: 2022-11-04 | Stop reason: SDUPTHER

## 2022-09-14 NOTE — PROGRESS NOTES
Chief Complaint   Patient presents with   • Heartburn       Subjective    Mandi Walters is a 22 y.o. female. she is here today for follow-up.  22-year-old female presents for EGD follow-up.  Previously schedule patient for colonoscopy but she would not complete because she did not want to drink prep.  States she had syncopal episode and was seen by PCP had follow-up echo and told everything was normal.  Reports she cannot tell a difference with Protonix but does not routinely take it.  States bowel habits are typically constipated but has intermittent episodes of diarrhea.  Takes Colace as needed.  Patient's main concern is getting refill of her Phenergan she states is only thing that helps her abdominal pain and helps her eat.  Her weight is up 3 pounds from last check.  I have previously ordered gastric empty study which she did not complete.    Heartburn  She complains of abdominal pain and early satiety. She reports no belching, no chest pain, no choking, no coughing, no dysphagia, no nausea or no sore throat. Tooth decay: constipation. Associated symptoms include fatigue.   Constipation  This is a chronic problem. The current episode started more than 1 month ago. The problem has been waxing and waning since onset. Her stool frequency is 2 to 3 times per week. Associated symptoms include abdominal pain and bloating. Pertinent negatives include no anorexia, back pain, diarrhea, difficulty urinating, fecal incontinence, fever, flatus, hematochezia, hemorrhoids, nausea, rectal pain or vomiting. She has tried fiber for the symptoms.     EGD 9/6/2022 noted grade 3 esophagitis gastritis and normal duodenum.  Antrum biopsy noted reactive gastropathy.  Esophageal biopsy noted mild reactive squamous mucosa negative for Peterson's mucosa.        Plan; discussed importance of weaning off of phenergan daily use.  Suspect abdominal pain from uncontrolled constipation recommend medication for chronic constipation  patient concerned about diarrhea we will start her on low-dose Linzess.  We will no longer refill her phenergan follow-up with Dr. Quiñonez for recheck May take Zofran as needed for nausea.       The following portions of the patient's history were reviewed and updated as appropriate:   Past Medical History:   Diagnosis Date   • Abdominal pain    • Bacterial vaginosis    • Chlamydia    • Costal chondritis    • Fatigue    • GERD (gastroesophageal reflux disease)    • Gonorrhea    • Hypoglycemia    • Malaise and fatigue    • Menorrhagia    • Well child visit      Past Surgical History:   Procedure Laterality Date   • ENDOSCOPY N/A 2019    Procedure: ESOPHAGOGASTRODUODENOSCOPY possible dilation;  Surgeon: Guicho Quiñonez MD;  Location: [facility] ENDOSCOPY;  Service: Gastroenterology   • TUBAL ABDOMINAL LIGATION  2020   • TYMPANOSTOMY TUBE PLACEMENT      Tympanostomy tube-2 (1)      Family History   Problem Relation Age of Onset   • Osteoarthritis Mother    • Diabetes Maternal Grandmother    • Heart disease Maternal Grandmother    • Osteoarthritis Maternal Grandmother    • Coronary artery disease Paternal Grandfather      OB History        1    Para        Term                AB   1    Living           SAB   1    IAB        Ectopic        Molar        Multiple        Live Births                  Prior to Admission medications    Medication Sig Start Date End Date Taking? Authorizing Provider    MG capsule TAKE 1 CAPSULE BY MOUTH TWICE DAILY AS NEEDED FOR CONSTIPATION 2/15/22  Yes Sherrell Bull APRN   ondansetron (Zofran) 4 MG tablet Take 1 tablet by mouth Every 8 (Eight) Hours As Needed for Nausea or Vomiting. 22  Yes Sherrell Bull APRN   pantoprazole (PROTONIX) 40 MG EC tablet Take 1 tablet by mouth Daily. 22  Yes Sherrell Bull APRN   promethazine (PHENERGAN) 25 MG tablet Take 1 tablet by mouth Every 6 (Six) Hours As Needed for Nausea or Vomiting. 22  Yes Sherrell Bull APRN  "  simethicone (MYLICON) 125 MG chewable tablet Chew 125 mg Every 6 (Six) Hours As Needed for Flatulence.   Yes Provider, MD Fam   vitamin B-12 (CYANOCOBALAMIN) 1000 MCG tablet Take 1,000 mcg by mouth Daily.   Yes Provider, Fam, MD   MoviPrep 100 g reconstituted solution powder Take 1,000 mL by mouth Every 12 (Twelve) Hours. 22  Sherrell Bull APRN     Allergies   Allergen Reactions   • Amoxicillin Anaphylaxis     Anaphylaxis   • Loratadine Unknown - High Severity     Unknown   • Penicillins Anaphylaxis   • Codeine Rash     Social History     Socioeconomic History   • Marital status: Single   Tobacco Use   • Smoking status: Former Smoker     Packs/day: 0.25     Years: 7.00     Pack years: 1.75     Quit date:      Years since quittin.7   • Smokeless tobacco: Never Used   Substance and Sexual Activity   • Alcohol use: No   • Drug use: No   • Sexual activity: Yes     Partners: Male     Birth control/protection: Tubal ligation       Review of Systems  Review of Systems   Constitutional: Positive for fatigue. Negative for activity change, appetite change, chills, diaphoresis, fever and unexpected weight change.   HENT: Negative for sore throat and trouble swallowing.    Respiratory: Negative for cough, choking and shortness of breath.    Cardiovascular: Negative for chest pain.   Gastrointestinal: Positive for abdominal pain, bloating and constipation (takes otc med ). Negative for abdominal distention, anal bleeding, anorexia, blood in stool, diarrhea, dysphagia, flatus, hematochezia, hemorrhoids, nausea, rectal pain and vomiting.   Genitourinary: Negative for difficulty urinating.   Musculoskeletal: Negative for arthralgias and back pain.   Skin: Negative for pallor.   Neurological: Negative for light-headedness.        /81 (BP Location: Left arm)   Pulse 93   Ht 165.1 cm (65\")   Wt 55.8 kg (123 lb)   BMI 20.47 kg/m²     Objective    Physical Exam  Constitutional:       " General: She is not in acute distress.     Appearance: Normal appearance. She is normal weight. She is not ill-appearing.   HENT:      Head: Normocephalic and atraumatic.   Pulmonary:      Effort: Pulmonary effort is normal.   Abdominal:      General: Abdomen is flat. Bowel sounds are normal. There is no distension.      Palpations: Abdomen is soft. There is no mass.      Tenderness: There is no abdominal tenderness.   Neurological:      Mental Status: She is alert.       Admission on 2022, Discharged on 2022   Component Date Value Ref Range Status   • Reference Lab Report 2022    Final                    Value:[facility]  [address] [place] [address]  Phone: [phone] or [phone]  Fax: [phone]  Arun Sinclair M.D., Medical Director    PATIENT NAME                           LABORATORY NO.  VITO DAMIAN.                [id number]  [id number]                         AGE              SEX  SSN           CLIENT REF #  [facility]           22      2000  F    [id number]   [id number]    [place]                       REQUESTING M.D.     ATTENDING M.D.     COPY TO[address]                 [place] [address]             DATE COLLECTED      DATE RECEIVED      DATE REPORTED  2022    DIAGNOSIS:  A.   GASTRIC ANTRUM, BIOPSY:  Reactive gastropathy  B.   ESOPHAGUS, BIOPSY, DISTAL:  Reactive gastric and squamous mucosa  Negative for specialized Peterson's mucosa    JBS/pah    CLINICAL HISTORY:  Gastroesophageal reflux                           disease with esophagitis without hemorrhage,  generalized abdominal pain, change in bowel habits, diarrhea, unspecified type    SPECIMENS RECEIVED:  A.  GASTRIC ANTRUM, BIOPSY  B.  ESOPHAGUS, BIOPSY, DISTAL    MICROSCOPIC DESCRIPTION:  Tissue blocks are prepared and slides are examined  "microscopically on all  specimens. See diagnosis for details.    Professional interpretation rendered by Fabio Jacobs M.D. at [place],  [facility], [address] [place] [address].    GROSS DESCRIPTION:  A.  Specimen is received in 1 formalin filled container labeled \"gastric,  antrum\" and consists of 2 portions of tan soft tissue measuring 0.4 x 0.4 x  0.2 cm in aggregate.  Submitted entirely in 1 cassette.  MTH  B.  Specimen is received in 1 formalin filled container labeled \"esophagus,  distal\" and consists of 2 portions of gray soft tissue measuring 0.5 x 0.4 x  0.2 cm in aggregate.  Submitted entirely in 1 cassette.    REVIEWED, DIAGNOSED AND ELECTRONICALLY  SIGNED BY:    Fabio Jacobs M.D.  CPT CODES:  88305x2       Assessment & Plan      1. Gastroesophageal reflux disease with esophagitis without hemorrhage    2. Chronic idiopathic constipation    .   Recommend antireflux lifestyle continue PPI daily patient states she is going to stop it as it does not change her symptoms.  No further refills of Phenergan May use Zofran for breakthrough nausea.  Start Linzess 72 daily for constipation follow-up with Dr. Quiñonez for recheck.    Orders placed during this encounter include:  No orders of the defined types were placed in this encounter.      * Surgery not found *    Review and/or summary of lab tests, radiology, procedures, medications. Review and summary of old records and obtaining of history. The risks and benefits of my recommendations, as well as other treatment options were discussed with the patient today. Questions were answered.    New Medications Ordered This Visit   Medications   • linaclotide (Linzess) 72 MCG capsule capsule     Sig: Take 1 capsule by mouth Every Morning Before Breakfast.     Dispense:  30 capsule     Refill:  1   • promethazine (PHENERGAN) 25 MG tablet     Sig: Take 1 tablet by mouth Every 6 (Six) Hours As Needed for Nausea or Vomiting.     " Dispense:  30 tablet     Refill:  0       Follow-up: Return in about 4 weeks (around 10/12/2022) for Recheck.          This document has been electronically signed by ANH Xiao on 2022 13:23 CDT        I spent 18 minutes caring for Vito on this date of service. This time includes time spent by me in the following activities:preparing for the visit, reviewing tests, obtaining and/or reviewing a separately obtained history, performing a medically appropriate examination and/or evaluation , counseling and educating the patient/family/caregiver, ordering medications, tests, or procedures, referring and communicating with other health care professionals , documenting information in the medical record and care coordination    Results for orders placed or performed during the hospital encounter of 22   TISSUE EXAM, [facility] ([facility])    Specimen: A: Gastric, Antrum; Tissue    B: Esophagus, Distal; Tissue   Result Value Ref Range    Reference Lab Report       [facility]  [address] [place] [address]  Phone: [phone] or [phone]  Fax: [phone]  Arun Sinclair M.D., Medical Director    PATIENT NAME                           LABORATORY NO.  VITO DAMIAN.                [id number]  [id number]                         AGE              SEX  N           CLIENT REF #  [facility]           22      2000  F    [id number]   [id number]    [place]                       REQUESTING M.D.     ATTENDING M.D.     COPY TO.  [address] [place] [address] [place] [address]             DATE COLLECTED      DATE RECEIVED      DATE REPORTED  2022    DIAGNOSIS:  A.   GASTRIC ANTRUM, BIOPSY:  Reactive gastropathy  B.   ESOPHAGUS, BIOPSY, DISTAL:  Reactive gastric and squamous mucosa  Negative for specialized Peterson's  "mucosa    JBS/pah    CLINICAL HISTORY:  Gastroesophageal reflux  disease with esophagitis without hemorrhage,  generalized abdominal pain, change in bowel habits, diarrhea, unspecified type    SPECIMENS RECEIVED:  A.  GASTRIC ANTRUM, BIOPSY  B.  ESOPHAGUS, BIOPSY, DISTAL    MICROSCOPIC DESCRIPTION:  Tissue blocks are prepared and slides are examined microscopically on all  specimens. See diagnosis for details.    Professional interpretation rendered by Fabio Jacobs M.D. at [place],  [facility], [address] [place] [address].    GROSS DESCRIPTION:  A.  Specimen is received in 1 formalin filled container labeled \"gastric,  antrum\" and consists of 2 portions of tan soft tissue measuring 0.4 x 0.4 x  0.2 cm in aggregate.  Submitted entirely in 1 cassette.  MTH  B.  Specimen is received in 1 formalin filled container labeled \"esophagus,  distal\" and consists of 2 portions of gray soft tissue measuring 0.5 x 0.4 x  0.2 cm in aggregate.  Submitted entirely in 1 cassette.    REVIEWED, DIAGNOSED AND ELECTRONICALLY  SIGNED BY:    Fabio Jacobs M.D.  CPT CODES:  88305x2     Results for orders placed or performed in visit on 02/25/22   Hepatitis Panel, Acute    Specimen: Blood   Result Value Ref Range    Hepatitis B Surface Ag Non-Reactive Non-Reactive    Hep A IgM Non-Reactive Non-Reactive    Hep B C IgM Non-Reactive Non-Reactive    Hepatitis C Ab Non-Reactive Non-Reactive   Results for orders placed or performed in visit on 02/19/21   Liquid-based Pap Smear, Screening    Specimen: Cervix; ThinPrep Vial   Result Value Ref Range    Case Report       Gynecologic Cytology Report                       Case: [id number]                                  Authorizing Provider:  Tean Porras    Collected:           02/19/2021 10:06 AM                                 ANH Wynne                                                                  Ordering Location:     [facility]     Received:         "    02/19/2021 10:24 AM                                 [facility]                                                                 First Screen:          Nasra Kelley                                                               Specimen:    Sendout to [facility], Cervix                                                                     Interpretation See attached report    Results for orders placed or performed in visit on 11/06/20   POC Pregnancy, Urine    Specimen: Urine   Result Value Ref Range    HCG, Urine, QL Negative Negative    Lot Number [id number]     Internal Positive Control Positive     Internal Negative Control Negative    Results for orders placed or performed in visit on 05/14/20   CBC Auto Differential    Specimen: Blood   Result Value Ref Range    WBC 5.94 3.40 - 10.80 10*3/mm3    RBC 4.98 3.77 - 5.28 10*6/mm3    Hemoglobin 14.4 12.0 - 15.9 g/dL    Hematocrit 45.9 34.0 - 46.6 %    MCV 92.2 79.0 - 97.0 fL    MCH 28.9 26.6 - 33.0 pg    MCHC 31.4 (L) 31.5 - 35.7 g/dL    RDW 14.7 12.3 - 15.4 %    RDW-SD 48.0 37.0 - 54.0 fl    MPV 10.2 6.0 - 12.0 fL    Platelets 250 140 - 450 10*3/mm3    Neutrophil % 51.5 42.7 - 76.0 %    Lymphocyte % 38.6 19.6 - 45.3 %    Monocyte % 7.1 5.0 - 12.0 %    Eosinophil % 2.5 0.3 - 6.2 %    Basophil % 0.3 0.0 - 1.5 %    Neutrophils, Absolute 3.06 1.70 - 7.00 10*3/mm3    Lymphocytes, Absolute 2.29 0.70 - 3.10 10*3/mm3    Monocytes, Absolute 0.42 0.10 - 0.90 10*3/mm3    Eosinophils, Absolute 0.15 0.00 - 0.40 10*3/mm3    Basophils, Absolute 0.02 0.00 - 0.20 10*3/mm3   Vitamin D 25 Hydroxy    Specimen: Blood   Result Value Ref Range    25 Hydroxy, Vitamin D 38.3 30.0 - 100.0 ng/ml   TSH    Specimen: Blood   Result Value Ref Range    TSH 1.520 0.270 - 4.200 uIU/mL   T4, Free    Specimen: Blood   Result Value Ref Range    Free T4 1.37 0.93 - 1.70 ng/dL   Vitamin B12    Specimen: Blood   Result Value Ref Range    Vitamin B-12 901 211 - 946 pg/mL   Lipid Panel    Specimen: Blood    Result Value Ref Range    Total Cholesterol 141 (L) 150 - 200 mg/dL    Triglycerides 97 <=150 mg/dL    HDL Cholesterol 79 (H) 40 - 59 mg/dL    LDL Cholesterol  43 <=100 mg/dL    VLDL Cholesterol 19.4 mg/dL    LDL/HDL Ratio 0.54 0.00 - 3.22   Comprehensive Metabolic Panel    Specimen: Blood   Result Value Ref Range    Glucose 67 (L) 70 - 99 mg/dL    BUN 8 7 - 23 mg/dL    Creatinine 0.82 0.52 - 1.04 mg/dL    Sodium 142 137 - 145 mmol/L    Potassium 4.0 3.4 - 5.0 mmol/L    Chloride 107 101 - 112 mmol/L    CO2 27.0 22.0 - 30.0 mmol/L    Calcium 10.0 8.4 - 10.2 mg/dL    Total Protein 7.3 6.3 - 8.6 g/dL    Albumin 4.50 3.50 - 5.00 g/dL    ALT (SGPT) 10 <=35 U/L    AST (SGOT) 18 14 - 36 U/L    Alkaline Phosphatase 41 38 - 126 U/L    Total Bilirubin 0.4 0.2 - 1.3 mg/dL    eGFR Non  Amer 87 71 - 165 mL/min/1.73    Globulin 2.8 2.3 - 3.5 gm/dL    A/G Ratio 1.6 1.1 - 1.8 g/dL    BUN/Creatinine Ratio 9.8 7.0 - 25.0    Anion Gap 8.0 5.0 - 15.0 mmol/L   Results for orders placed or performed in visit on 10/08/19   Liquid-based Pap Smear, Screening    Specimen: Cervix; ThinPrep Vial   Result Value Ref Range    Case Report       Gynecologic Cytology Report                       Case: [id number]                                  Authorizing Provider:  Tena Porras    Collected:           10/08/2019 02:00 PM                                 ANH Wynne                                                                  Ordering Location:     [facility]     Received:            10/08/2019 04:01 PM                                 [facility]                                                               First Screen:          Matilda Renee                                                              Pathologist:           Fabio Jacobs MD                                                           Specimen:    Liquid-Based Pap, Screening, Cervix                                                         Interpretation Negative for intraepithelial lesion or malignancy      General Categorization Within normal limits     Other Findings Reactive cellular changes     Specimen Adequacy       Satisfactory for evaluation, endocervical/transformation zone component present    Additional Information       Disclaimer: Cervical cytology is a screening test primarily for squamous cancer and its precursors and has associated false-negative and false-positive results.  Technologies such as liquid-based preparations may decrease but will not eliminate all false-negative results.  Follow-up of unexplained clinical signs and symptoms is recommended to minimize false-negative results. (The [place] System for Reporting Cervical Cytology: Beth, 2015).     Results for orders placed or performed during the hospital encounter of 07/23/19   Tissue Pathology Exam    Specimen: A: Small Intestine, Duodenum; Tissue    B: Gastric, Antrum; Tissue    C: Esophagus, Distal; Tissue   Result Value Ref Range    Case Report       Surgical Pathology Report                         Case: [id number]                                  Authorizing Provider:  Guicho Quiñonez MD        Collected:           07/23/2019 02:33 PM          Ordering Location:     [facility]             Received:            07/24/2019 06:48 AM                                 [facility]                                                     Pathologist:           Fabio Jacobs MD                                                           Specimens:   1) - Small Intestine, Duodenum, small bowel bx                                                      2) - Gastric, Antrum, antrum bx                                                                     3) - Esophagus, Distal, distal esophagus bx                                                Final Diagnosis       1.  SMALL BOWEL, BIOPSY:  NO SIGNIFICANT HISTOLOGIC ABNORMALITY.    2.  GASTRIC ANTRUM, BIOPSY:  NO  SIGNIFICANT HISTOLOGIC ABNORMALITY.    3.  DISTAL ESOPHAGUS, BIOPSY:  BENIGN SQUAMOUS MUCOSA.      Gross Description       1.  Two tan fragments measure 0.8 x 0.5 x 0.2 cm together.  Totally submitted.    2.  Two tan fragments measure 0.5 x 0.5 x 0.2 cm together.  Totally submitted.    3.  Two gray fragments measure 0.8 x 0.7 x 0.1 cm together.  Totally submitted.         Pregnancy, Urine -    Specimen: Urine   Result Value Ref Range    HCG, Urine QL Negative Negative   Results for orders placed or performed in visit on 06/14/19   Chlamydia trachomatis, Neisseria gonorrhoeae, Trichomonas vaginalis, PCR - Urine, Urine, Clean Catch    Specimen: Urine, Clean Catch   Result Value Ref Range    Chlamydia DNA by PCR Negative Negative    Neisseria gonorrhoeae by PCR Negative Negative    Trichomonas vaginalis PCR Negative    Results for orders placed or performed in visit on 05/07/19   Urinalysis With Culture If Indicated - Urine, Clean Catch    Specimen: Urine, Clean Catch   Result Value Ref Range    Color, UA Yellow Yellow, Straw    Appearance, UA Clear Clear    pH, UA 5.5 5.5 - 8.0    Specific Gravity, UA >=1.030 1.005 - 1.030    Glucose, UA Negative Negative    Ketones, UA Trace (A) Negative    Bilirubin, UA Negative Negative    Blood, UA Negative Negative    Protein, UA Negative Negative    Leuk Esterase, UA Negative Negative    Nitrite, UA Negative Negative    Urobilinogen, UA 1.0 E.U./dL 0.2 - 1.0 E.U./dL   CBC Auto Differential    Specimen: Blood   Result Value Ref Range    WBC 6.71 3.40 - 10.80 10*3/mm3    RBC 4.99 3.77 - 5.28 10*6/mm3    Hemoglobin 14.5 12.0 - 15.9 g/dL    Hematocrit 44.6 34.0 - 46.6 %    MCV 89.4 79.0 - 97.0 fL    MCH 29.1 26.6 - 33.0 pg    MCHC 32.5 31.5 - 35.7 g/dL    RDW 14.4 12.3 - 15.4 %    RDW-SD 45.8 37.0 - 54.0 fl    MPV 10.4 6.0 - 12.0 fL    Platelets 261 140 - 450 10*3/mm3    Neutrophil % 41.6 (L) 42.7 - 76.0 %    Lymphocyte % 49.0 (H) 19.6 - 45.3 %    Monocyte % 7.0 5.0 - 12.0 %     Eosinophil % 2.1 0.3 - 6.2 %    Basophil % 0.3 0.0 - 1.5 %    Neutrophils, Absolute 2.79 1.70 - 7.00 10*3/mm3    Lymphocytes, Absolute 3.29 (H) 0.70 - 3.10 10*3/mm3    Monocytes, Absolute 0.47 0.10 - 0.90 10*3/mm3    Eosinophils, Absolute 0.14 0.00 - 0.40 10*3/mm3    Basophils, Absolute 0.02 0.00 - 0.20 10*3/mm3     *Note: Due to a large number of results and/or encounters for the requested time period, some results have not been displayed. A complete set of results can be found in Results Review.

## 2022-11-04 RX ORDER — PROMETHAZINE HYDROCHLORIDE 25 MG/1
25 TABLET ORAL EVERY 6 HOURS PRN
Qty: 30 TABLET | Refills: 0 | Status: CANCELLED | OUTPATIENT
Start: 2022-11-04

## 2022-11-04 RX ORDER — PROMETHAZINE HYDROCHLORIDE 25 MG/1
25 TABLET ORAL EVERY 6 HOURS PRN
Qty: 30 TABLET | Refills: 0 | Status: SHIPPED | OUTPATIENT
Start: 2022-11-04 | End: 2022-11-18 | Stop reason: SDUPTHER

## 2022-11-18 RX ORDER — PROMETHAZINE HYDROCHLORIDE 25 MG/1
25 TABLET ORAL EVERY 6 HOURS PRN
Qty: 30 TABLET | Refills: 2 | Status: SHIPPED | OUTPATIENT
Start: 2022-11-18 | End: 2023-02-01 | Stop reason: ALTCHOICE

## 2022-11-18 RX ORDER — PANTOPRAZOLE SODIUM 40 MG/1
40 TABLET, DELAYED RELEASE ORAL DAILY
Qty: 30 TABLET | Refills: 5 | Status: SHIPPED | OUTPATIENT
Start: 2022-11-18 | End: 2023-02-01 | Stop reason: ALTCHOICE

## 2023-02-01 ENCOUNTER — LAB (OUTPATIENT)
Dept: LAB | Facility: OTHER | Age: 23
End: 2023-02-01
Payer: COMMERCIAL

## 2023-02-01 ENCOUNTER — OFFICE VISIT (OUTPATIENT)
Dept: GASTROENTEROLOGY | Facility: CLINIC | Age: 23
End: 2023-02-01
Payer: COMMERCIAL

## 2023-02-01 ENCOUNTER — OFFICE VISIT (OUTPATIENT)
Dept: OBSTETRICS AND GYNECOLOGY | Facility: CLINIC | Age: 23
End: 2023-02-01
Payer: COMMERCIAL

## 2023-02-01 VITALS
HEIGHT: 65 IN | BODY MASS INDEX: 19.66 KG/M2 | SYSTOLIC BLOOD PRESSURE: 100 MMHG | HEART RATE: 97 BPM | WEIGHT: 118 LBS | DIASTOLIC BLOOD PRESSURE: 72 MMHG

## 2023-02-01 VITALS
WEIGHT: 118 LBS | DIASTOLIC BLOOD PRESSURE: 72 MMHG | SYSTOLIC BLOOD PRESSURE: 100 MMHG | HEART RATE: 97 BPM | BODY MASS INDEX: 19.66 KG/M2 | HEIGHT: 65 IN

## 2023-02-01 DIAGNOSIS — R19.4 CHANGE IN BOWEL HABITS: Primary | ICD-10-CM

## 2023-02-01 DIAGNOSIS — R11.0 NAUSEA: ICD-10-CM

## 2023-02-01 DIAGNOSIS — Z01.419 ENCOUNTER FOR GYNECOLOGICAL EXAMINATION WITH PAPANICOLAOU SMEAR OF CERVIX: Primary | ICD-10-CM

## 2023-02-01 DIAGNOSIS — K21.00 GASTROESOPHAGEAL REFLUX DISEASE WITH ESOPHAGITIS WITHOUT HEMORRHAGE: ICD-10-CM

## 2023-02-01 DIAGNOSIS — K59.04 CHRONIC IDIOPATHIC CONSTIPATION: ICD-10-CM

## 2023-02-01 PROCEDURE — 99395 PREV VISIT EST AGE 18-39: CPT | Performed by: NURSE PRACTITIONER

## 2023-02-01 PROCEDURE — 3008F BODY MASS INDEX DOCD: CPT | Performed by: NURSE PRACTITIONER

## 2023-02-01 PROCEDURE — 99214 OFFICE O/P EST MOD 30 MIN: CPT | Performed by: PHYSICIAN ASSISTANT

## 2023-02-01 PROCEDURE — 2014F MENTAL STATUS ASSESS: CPT | Performed by: NURSE PRACTITIONER

## 2023-02-01 RX ORDER — DEXTROSE AND SODIUM CHLORIDE 5; .45 G/100ML; G/100ML
30 INJECTION, SOLUTION INTRAVENOUS CONTINUOUS PRN
Status: CANCELLED | OUTPATIENT
Start: 2023-02-27

## 2023-02-01 RX ORDER — FAMOTIDINE 20 MG/1
20 TABLET, FILM COATED ORAL 2 TIMES DAILY PRN
Qty: 60 TABLET | Refills: 1 | Status: SHIPPED | OUTPATIENT
Start: 2023-02-01

## 2023-02-01 RX ORDER — LUBIPROSTONE 8 UG/1
8 CAPSULE ORAL 2 TIMES DAILY WITH MEALS
Qty: 60 CAPSULE | Refills: 1 | Status: SHIPPED | OUTPATIENT
Start: 2023-02-01 | End: 2023-03-06 | Stop reason: SDUPTHER

## 2023-02-01 RX ORDER — ESOMEPRAZOLE MAGNESIUM 40 MG/1
40 CAPSULE, DELAYED RELEASE ORAL DAILY
Qty: 30 CAPSULE | Refills: 5 | Status: SHIPPED | OUTPATIENT
Start: 2023-02-01

## 2023-02-01 RX ORDER — ONDANSETRON 4 MG/1
4 TABLET, ORALLY DISINTEGRATING ORAL EVERY 8 HOURS PRN
Qty: 12 TABLET | Refills: 1 | Status: SHIPPED | OUTPATIENT
Start: 2023-02-01

## 2023-02-01 RX ORDER — SODIUM CHLORIDE 9 MG/ML
40 INJECTION, SOLUTION INTRAVENOUS AS NEEDED
Status: CANCELLED | OUTPATIENT
Start: 2023-02-27

## 2023-02-01 RX ORDER — LUBIPROSTONE 8 UG/1
8 CAPSULE ORAL 2 TIMES DAILY WITH MEALS
Qty: 60 CAPSULE | Refills: 1 | Status: SHIPPED | OUTPATIENT
Start: 2023-02-01 | End: 2023-02-01 | Stop reason: SDUPTHER

## 2023-02-01 NOTE — PROGRESS NOTES
[facility]      Chief Complaint:   Chief Complaint   Patient presents with   • Med Refill     Promethazine, has trouble eating and gaining weight without medication       Subjective     HPI:   Patient presents for follow-up visit related to continued GERD symptoms despite medication, requesting refill of Phenergan and chronic constipation.  Patient had EGD completed in September 2022 that showed grade 3 esophagitis and diffuse inflammation of the stomach.  Patient states she has been on Protonix for many years and it no longer is effective in controlling her symptoms.  Patient states prior to that she had taken Prilosec and Pepcid.  Patient states she believes she took Nexium which she cannot recall.  Patient reports that she takes Phenergan due to continued GERD symptoms.  She notes rare episodes of vomiting but states she has nausea very frequently.    Patient notes chronic constipation since she was young.  She states she had several episodes of diarrhea recently but has now gone back to having constipation.  Patient states she has never had a colonoscopy.  Patient states she was previously prescribed Linzess but was unable to get this medication through her insurance.  Patient states she has taken stool softeners and over-the-counter laxatives with little relief of her symptoms.    Assessment/plan:  1.  Persistent GERD symptoms despite treatment  2.  Frequent nausea  3.  Decreased appetite/early satiety  4.  Chronic constipation    Patient will be scheduled for EGD and colonoscopy for further evaluation of her continued symptoms.  Discontinue Protonix and change to Nexium 40 mg once daily.  Patient may also take Pepcid 20 mg twice daily as needed for breakthrough symptoms.  Patient will be prescribed Zofran 4 mg ODT as needed for nausea.  Encouraged her to use this on a very limited basis given her history of constipation.  I will not refill her Phenergan.  She will be  prescribed Amitiza 8 mcg twice daily for constipation.  Follow-up will be planned after procedures have been completed for results and recommendations based on findings.    Past Medical History:   Past Medical History:   Diagnosis Date   • Abdominal pain    • Bacterial vaginosis    • Chlamydia    • Costal chondritis    • Fatigue    • GERD (gastroesophageal reflux disease)    • Gonorrhea    • Hypoglycemia    • Malaise and fatigue    • Menorrhagia    • Well child visit        Past Surgical History:  Past Surgical History:   Procedure Laterality Date   • ENDOSCOPY N/A 7/23/2019    Procedure: ESOPHAGOGASTRODUODENOSCOPY possible dilation;  Surgeon: Guicho Quiñonez MD;  Location: [facility] ENDOSCOPY;  Service: Gastroenterology   • ENDOSCOPY N/A 9/6/2022    Procedure: ESOPHAGOGASTRODUODENOSCOPY;  Surgeon: Guicho Quiñonez MD;  Location: [facility] ENDOSCOPY;  Service: Gastroenterology;  Laterality: N/A;   • TUBAL ABDOMINAL LIGATION  09/14/2020   • TYMPANOSTOMY TUBE PLACEMENT      Tympanostomy tube-2 (1)        Family History:  Family History   Problem Relation Age of Onset   • Osteoarthritis Mother    • Diabetes Maternal Grandmother    • Heart disease Maternal Grandmother    • Osteoarthritis Maternal Grandmother    • Coronary artery disease Paternal Grandfather        Social History:   reports that she has been smoking cigarettes. She has a 1.75 pack-year smoking history. She has never used smokeless tobacco. She reports that she does not drink alcohol and does not use drugs.    Medications:   Prior to Admission medications    Medication Sig Start Date End Date Taking? Authorizing Provider    MG capsule TAKE 1 CAPSULE BY MOUTH TWICE DAILY AS NEEDED FOR CONSTIPATION 2/15/22  Yes Sherrell Bull APRN   lubiprostone (Amitiza) 8 MCG capsule Take 1 capsule by mouth 2 (Two) Times a Day With Meals. 2/1/23  Yes Dara Sher PA-C   pantoprazole (PROTONIX) 40 MG EC tablet Take 1 tablet by mouth Daily. 11/18/22 2/1/23 Yes Olamide  Guicho ROBLEDO MD   esomeprazole (nexIUM) 40 MG capsule Take 1 capsule by mouth Daily. 2/1/23   Dara Sher PA-C   famotidine (Pepcid) 20 MG tablet Take 1 tablet by mouth 2 (Two) Times a Day As Needed for Indigestion or Heartburn. 2/1/23   Dara Sher PA-C   ondansetron ODT (ZOFRAN-ODT) 4 MG disintegrating tablet Place 1 tablet on the tongue Every 8 (Eight) Hours As Needed for Nausea or Vomiting. 2/1/23   Dara Sher PA-C   polyethylene glycol (GoLYTELY) 236 g solution Starting at noon on day prior to procedure, drink 8 ounces every 30 minutes until all gone or stools are clear. May add flavor packet. 2/1/23   Dara Sher PA-C   linaclotide (Linzess) 72 MCG capsule capsule Take 1 capsule by mouth Every Morning Before Breakfast. 11/18/22 2/1/23  Guicho Quiñonez MD   lubiprostone (Amitiza) 8 MCG capsule Take 1 capsule by mouth 2 (Two) Times a Day With Meals. 2/1/23 2/1/23  Dara Sher PA-C   ondansetron (Zofran) 4 MG tablet Take 1 tablet by mouth Every 8 (Eight) Hours As Needed for Nausea or Vomiting. 7/7/22 2/1/23  Sherrell Bull APRN   promethazine (PHENERGAN) 25 MG tablet Take 1 tablet by mouth Every 6 (Six) Hours As Needed for Nausea or Vomiting. 11/18/22 2/1/23  Guicho Quiñonez MD   simethicone (MYLICON) 125 MG chewable tablet Chew 125 mg Every 6 (Six) Hours As Needed for Flatulence.  2/1/23  Fam Gurrola MD   vitamin B-12 (CYANOCOBALAMIN) 1000 MCG tablet Take 1,000 mcg by mouth Daily.  2/1/23  Fam Gurrola MD       Allergies:  Amoxicillin, Loratadine, Penicillins, and Codeine    ROS:    Review of Systems   Constitutional: Positive for unexpected weight change (weight loss). Negative for fever.   HENT: Negative.  Negative for trouble swallowing.    Eyes: Negative.    Respiratory: Negative.  Negative for shortness of breath.    Cardiovascular: Negative.  Negative for chest pain.   Gastrointestinal: Positive for abdominal pain, constipation, diarrhea and nausea. Negative for  "abdominal distention, anal bleeding, blood in stool, rectal pain and vomiting.   Endocrine: Negative.    Genitourinary: Negative.    Skin: Negative.    Neurological: Negative.    Hematological: Negative.    Psychiatric/Behavioral: Negative.      Objective     Blood pressure 100/72, pulse 97, height 165.1 cm (65\"), weight 53.5 kg (118 lb), last menstrual period 01/17/2023, not currently breastfeeding.    Physical Exam  Vitals and nursing note reviewed.   Constitutional:       Appearance: Normal appearance.   HENT:      Head: Normocephalic and atraumatic.   Eyes:      General: No scleral icterus.  Cardiovascular:      Rate and Rhythm: Normal rate and regular rhythm.   Pulmonary:      Effort: Pulmonary effort is normal.      Breath sounds: Normal breath sounds.   Abdominal:      General: Bowel sounds are normal.      Palpations: Abdomen is soft.      Tenderness: There is abdominal tenderness in the epigastric area and suprapubic area. There is no guarding or rebound.   Skin:     General: Skin is warm.      Coloration: Skin is not jaundiced.   Neurological:      General: No focal deficit present.      Mental Status: She is alert.   Psychiatric:         Behavior: Behavior normal.          Assessment & Plan   Diagnoses and all orders for this visit:    1. Change in bowel habits (Primary)  -     Case Request; Standing  -     sodium chloride 0.9 % infusion 40 mL  -     dextrose 5 % and sodium chloride 0.45 % infusion  -     Case Request    2. Chronic idiopathic constipation  -     Case Request; Standing  -     sodium chloride 0.9 % infusion 40 mL  -     dextrose 5 % and sodium chloride 0.45 % infusion  -     Case Request    3. Nausea  -     Case Request; Standing  -     sodium chloride 0.9 % infusion 40 mL  -     dextrose 5 % and sodium chloride 0.45 % infusion  -     Case Request    4. Gastroesophageal reflux disease with esophagitis without hemorrhage  -     Case Request; Standing  -     sodium chloride 0.9 % infusion 40 " mL  -     dextrose 5 % and sodium chloride 0.45 % infusion  -     Case Request    Other orders  -     Follow Anesthesia Guidelines / Protocol; Future  -     Obtain Informed Consent; Future  -     Implement Anesthesia Orders Day of Procedure; Standing  -     Obtain Informed Consent; Standing  -     POC Glucose Once; Standing  -     Insert Peripheral IV; Standing  -     polyethylene glycol (GoLYTELY) 236 g solution; Starting at noon on day prior to procedure, drink 8 ounces every 30 minutes until all gone or stools are clear. May add flavor packet.  Dispense: 4000 mL; Refill: 0  -     esomeprazole (nexIUM) 40 MG capsule; Take 1 capsule by mouth Daily.  Dispense: 30 capsule; Refill: 5  -     famotidine (Pepcid) 20 MG tablet; Take 1 tablet by mouth 2 (Two) Times a Day As Needed for Indigestion or Heartburn.  Dispense: 60 tablet; Refill: 1  -     ondansetron ODT (ZOFRAN-ODT) 4 MG disintegrating tablet; Place 1 tablet on the tongue Every 8 (Eight) Hours As Needed for Nausea or Vomiting.  Dispense: 12 tablet; Refill: 1  -     Discontinue: lubiprostone (Amitiza) 8 MCG capsule; Take 1 capsule by mouth 2 (Two) Times a Day With Meals.  Dispense: 60 capsule; Refill: 1  -     lubiprostone (Amitiza) 8 MCG capsule; Take 1 capsule by mouth 2 (Two) Times a Day With Meals.  Dispense: 60 capsule; Refill: 1        ESOPHAGOGASTRODUODENOSCOPY (N/A), COLONOSCOPY-look at TI, bx (N/A)     Diagnosis Plan   1. Change in bowel habits  Case Request    sodium chloride 0.9 % infusion 40 mL    dextrose 5 % and sodium chloride 0.45 % infusion    Case Request      2. Chronic idiopathic constipation  Case Request    sodium chloride 0.9 % infusion 40 mL    dextrose 5 % and sodium chloride 0.45 % infusion    Case Request      3. Nausea  Case Request    sodium chloride 0.9 % infusion 40 mL    dextrose 5 % and sodium chloride 0.45 % infusion    Case Request      4. Gastroesophageal reflux disease with esophagitis without hemorrhage  Case Request     sodium chloride 0.9 % infusion 40 mL    dextrose 5 % and sodium chloride 0.45 % infusion    Case Request          Anticipated Surgical Procedure:  Orders Placed This Encounter   Procedures   • Obtain Informed Consent     Standing Status:   Future     Order Specific Question:   Informed Consent Given For     Answer:   egd and colonoscopy       The risks, benefits, and alternatives of this procedure have been discussed with the patient or the responsible party- the patient understands and agrees to proceed.

## 2023-02-03 NOTE — PROGRESS NOTES
Subjective   Chief Complaint   Patient presents with   • Gynecologic Exam     WWADRYAN     Mandi Walters is a 22 y.o. year old  presenting to be seen for her annual exam.  Today she has no concerns.     Patient's last menstrual period was 2023 (exact date).    OB History        1    Para        Term                AB   1    Living           SAB   1    IAB        Ectopic        Molar        Multiple        Live Births                    Current birth control method: BTL    She is sexually active.  In the past 12 months there has not been new sexual partners.  Condoms are not typically used.  She would not like to be screened for STD's at today's exam.     Past 6 month menstrual history:    Cycle Frequency: regular, predictable and consistent every 28 - 32 days   Menstrual cycle character: flow is typically moderately heavy   Cycle Duration: 7-9   Number of heavy days of flows: 2   Dysmenorrhea: moderate and is not affecting her activities of daily living   PMS: is not affecting her activities of daily living   Intermenstrual bleeding present: no   Post-coital bleeding present: no     She exercises regularly: yes.  She wears her seat belt:yes.  She has concerns about domestic violence: no.  Last colonoscopy: Never  Last DEXA: Never  Last MMG: Never  Last pap: 21  History of abnormal PAP: No    The following portions of the patient's history were reviewed and updated as appropriate:problem list, current medications, allergies, past family history, past medical history, past social history and past surgical history.    Social History    Tobacco Use      Smoking status: Every Day        Packs/day: 0.25        Years: 7.00        Pack years: 1.75        Types: Cigarettes        Last attempt to quit:         Years since quittin.0      Smokeless tobacco: Never       Social History     Substance and Sexual Activity   Alcohol Use No      Review of Systems   Constitutional: Negative.   "Negative for chills and fever.   Respiratory: Negative.    Cardiovascular: Negative.    Gastrointestinal: Positive for constipation and nausea. Negative for abdominal pain, diarrhea and vomiting.        GERD   Endocrine: Negative.    Genitourinary: Negative for difficulty urinating, dysuria, menstrual problem, pelvic pain, urgency, vaginal bleeding, vaginal discharge and vaginal pain.   Skin: Negative.    Neurological: Negative for dizziness, syncope, light-headedness and headaches.   Psychiatric/Behavioral: Negative for suicidal ideas. The patient is not nervous/anxious.          Objective   /72   Pulse 97   Ht 165.1 cm (65\")   Wt 53.5 kg (118 lb)   LMP 01/17/2023 (Exact Date)   BMI 19.64 kg/m²     General:  well developed; well nourished  no acute distress   Skin:  No suspicious lesions seen   Thyroid: not examined   Breasts:  Examined in supine position  Symmetric without masses or skin dimpling  Nipples normal without inversion, lesions or discharge  There are no palpable axillary nodes  Fibrocystic changes are present both breasts without a discrete mass   Abdomen: no umbilical or inguinal hernias are present  no hepato-splenomegaly  Normal findings: bowel sounds normal  Abnormal findings: mild tenderness generalized   Cardiac: Heart sounds are normal.  Regular rate and rhythm without murmur, gallop or rub.   Resp: Normal expansion.  Clear to auscultation.  No rales, rhonchi, or wheezing.   Psych: alert,oriented, in NAD with a full range of affect, normal behavior and no psychotic features   Pelvis: Uterus:  Clinical staff was present for exam  External genitalia:  normal appearance of the external genitalia including Bartholin's and Akins's glands.  :  urethral meatus normal;  Vaginal:  normal pink mucosa without prolapse or lesions  Cervix:  normal appearance.  Uterus:  normal size, shape and consistency  Adnexa:  normal bimanual exam of the adnexa.     Lab Review   No data " reviewed    Imaging  No data reviewed       Diagnoses and all orders for this visit:    1. Encounter for gynecological examination with Papanicolaou smear of cervix (Primary)  -     LIQUID-BASED PAP SMEAR, [facility] ([facility])    Pap results: I will send card in mail or call if abnormal. RTC annually for well woman exams.     SBE encouraged monthly. MMG at 37yo.     BTL as contraceptive. Periods are manageable.     This note was electronically signed.    Tena Porras, APRN  February 3, 2023

## 2023-02-06 LAB — REF LAB TEST METHOD: NORMAL

## 2023-02-27 ENCOUNTER — ANESTHESIA EVENT (OUTPATIENT)
Dept: GASTROENTEROLOGY | Facility: HOSPITAL | Age: 23
End: 2023-02-27
Payer: COMMERCIAL

## 2023-02-27 ENCOUNTER — ANESTHESIA (OUTPATIENT)
Dept: GASTROENTEROLOGY | Facility: HOSPITAL | Age: 23
End: 2023-02-27
Payer: COMMERCIAL

## 2023-02-27 ENCOUNTER — HOSPITAL ENCOUNTER (OUTPATIENT)
Facility: HOSPITAL | Age: 23
Setting detail: HOSPITAL OUTPATIENT SURGERY
Discharge: HOME | End: 2023-02-27
Attending: INTERNAL MEDICINE | Admitting: INTERNAL MEDICINE
Payer: COMMERCIAL

## 2023-02-27 VITALS
DIASTOLIC BLOOD PRESSURE: 53 MMHG | HEART RATE: 86 BPM | WEIGHT: 114 LBS | TEMPERATURE: 97.1 F | RESPIRATION RATE: 15 BRPM | SYSTOLIC BLOOD PRESSURE: 98 MMHG | BODY MASS INDEX: 18.32 KG/M2 | OXYGEN SATURATION: 97 % | HEIGHT: 66 IN

## 2023-02-27 DIAGNOSIS — R19.4 CHANGE IN BOWEL HABITS: ICD-10-CM

## 2023-02-27 DIAGNOSIS — K21.00 GASTROESOPHAGEAL REFLUX DISEASE WITH ESOPHAGITIS WITHOUT HEMORRHAGE: ICD-10-CM

## 2023-02-27 DIAGNOSIS — R11.0 NAUSEA: ICD-10-CM

## 2023-02-27 DIAGNOSIS — K59.04 CHRONIC IDIOPATHIC CONSTIPATION: ICD-10-CM

## 2023-02-27 PROCEDURE — 43239 EGD BIOPSY SINGLE/MULTIPLE: CPT | Performed by: INTERNAL MEDICINE

## 2023-02-27 PROCEDURE — 45380 COLONOSCOPY AND BIOPSY: CPT | Performed by: INTERNAL MEDICINE

## 2023-02-27 PROCEDURE — 25010000002 PROPOFOL 10 MG/ML EMULSION

## 2023-02-27 RX ORDER — PROPOFOL 10 MG/ML
VIAL (ML) INTRAVENOUS AS NEEDED
Status: DISCONTINUED | OUTPATIENT
Start: 2023-02-27 | End: 2023-02-27 | Stop reason: SURG

## 2023-02-27 RX ORDER — LIDOCAINE HYDROCHLORIDE 20 MG/ML
INJECTION, SOLUTION INTRAVENOUS AS NEEDED
Status: DISCONTINUED | OUTPATIENT
Start: 2023-02-27 | End: 2023-02-27 | Stop reason: SURG

## 2023-02-27 RX ORDER — SODIUM CHLORIDE 9 MG/ML
40 INJECTION, SOLUTION INTRAVENOUS AS NEEDED
Status: DISCONTINUED | OUTPATIENT
Start: 2023-02-27 | End: 2023-02-27 | Stop reason: HOSPADM

## 2023-02-27 RX ORDER — DEXTROSE AND SODIUM CHLORIDE 5; .45 G/100ML; G/100ML
30 INJECTION, SOLUTION INTRAVENOUS CONTINUOUS PRN
Status: DISCONTINUED | OUTPATIENT
Start: 2023-02-27 | End: 2023-02-27 | Stop reason: HOSPADM

## 2023-02-27 RX ORDER — ONDANSETRON 2 MG/ML
4 INJECTION INTRAMUSCULAR; INTRAVENOUS ONCE AS NEEDED
Status: DISCONTINUED | OUTPATIENT
Start: 2023-02-27 | End: 2023-02-27 | Stop reason: HOSPADM

## 2023-02-27 RX ADMIN — PROPOFOL 20 MG: 10 INJECTION, EMULSION INTRAVENOUS at 14:49

## 2023-02-27 RX ADMIN — PROPOFOL 20 MG: 10 INJECTION, EMULSION INTRAVENOUS at 14:55

## 2023-02-27 RX ADMIN — LIDOCAINE HYDROCHLORIDE 60 MG: 20 INJECTION, SOLUTION INTRAVENOUS at 14:46

## 2023-02-27 RX ADMIN — PROPOFOL 20 MG: 10 INJECTION, EMULSION INTRAVENOUS at 14:50

## 2023-02-27 RX ADMIN — PROPOFOL 50 MG: 10 INJECTION, EMULSION INTRAVENOUS at 14:46

## 2023-02-27 RX ADMIN — PROPOFOL 50 MG: 10 INJECTION, EMULSION INTRAVENOUS at 14:47

## 2023-02-27 RX ADMIN — PROPOFOL 20 MG: 10 INJECTION, EMULSION INTRAVENOUS at 14:52

## 2023-02-27 RX ADMIN — PROPOFOL 20 MG: 10 INJECTION, EMULSION INTRAVENOUS at 14:53

## 2023-02-27 RX ADMIN — PROPOFOL 20 MG: 10 INJECTION, EMULSION INTRAVENOUS at 14:56

## 2023-02-27 RX ADMIN — PROPOFOL 20 MG: 10 INJECTION, EMULSION INTRAVENOUS at 14:51

## 2023-02-27 RX ADMIN — DEXTROSE AND SODIUM CHLORIDE 30 ML/HR: 5; 450 INJECTION, SOLUTION INTRAVENOUS at 14:30

## 2023-02-27 NOTE — ANESTHESIA PREPROCEDURE EVALUATION
Anesthesia Evaluation     Patient summary reviewed and Nursing notes reviewed   NPO Solid Status: > 8 hours  NPO Liquid Status: > 2 hours           Airway   Mallampati: II  TM distance: >3 FB  Neck ROM: full  No difficulty expected  Dental - normal exam     Pulmonary - normal exam   (+) a smoker Current,   Cardiovascular - normal exam  Exercise tolerance: excellent (>7 METS)    (+) dysrhythmias PAC,   (-) past MI, CAD, angina      Neuro/Psych- negative ROS  (-) seizures, CVA  GI/Hepatic/Renal/Endo    (+)  GERD well controlled,  renal disease stones,     Musculoskeletal (-) negative ROS    Abdominal  - normal exam   Substance History - negative use     OB/GYN negative ob/gyn ROS         Other - negative ROS                         Anesthesia Plan    ASA 2     general   total IV anesthesia  intravenous induction     Anesthetic plan, risks, benefits, and alternatives have been provided, discussed and informed consent has been obtained with: patient.    Plan discussed with CRNA.        CODE STATUS:

## 2023-02-27 NOTE — ANESTHESIA POSTPROCEDURE EVALUATION
Patient: Mandi Walters    Procedure Summary     Date: 02/27/23 Room / Location: [facility] ENDOSCOPY 1 / [facility] ENDOSCOPY    Anesthesia Start: 1439 Anesthesia Stop: 1458    Procedures:       ESOPHAGOGASTRODUODENOSCOPY      COLONOSCOPY-look at TI, bx Diagnosis:       Change in bowel habits      Chronic idiopathic constipation      Nausea      Gastroesophageal reflux disease with esophagitis without hemorrhage      (Change in bowel habits [R19.4])      (Chronic idiopathic constipation [K59.04])      (Nausea [R11.0])      (Gastroesophageal reflux disease with esophagitis without hemorrhage [K21.00])    Surgeons: Guicho Quiñonez MD Provider: Gloria Darling CRNA    Anesthesia Type: general ASA Status: 2          Anesthesia Type: general    Vitals  No vitals data found for the desired time range.          Post Anesthesia Care and Evaluation    Patient location during evaluation: bedside  Patient participation: waiting for patient participation  Level of consciousness: responsive to verbal stimuli  Pain management: adequate    Airway patency: patent  Anesthetic complications: No anesthetic complications  PONV Status: none  Cardiovascular status: acceptable  Respiratory status: acceptable  Hydration status: acceptable    Comments: ---------------------------               02/27/23                      1407         ---------------------------   BP:          107/66         Pulse:         97           Resp:          20           Temp:   97.4 °F (36.3 °C)   SpO2:          99%         ---------------------------

## 2023-03-01 LAB — REF LAB TEST METHOD: NORMAL

## 2023-03-06 ENCOUNTER — OFFICE VISIT (OUTPATIENT)
Dept: GASTROENTEROLOGY | Facility: CLINIC | Age: 23
End: 2023-03-06
Payer: COMMERCIAL

## 2023-03-06 VITALS
DIASTOLIC BLOOD PRESSURE: 60 MMHG | BODY MASS INDEX: 18.84 KG/M2 | WEIGHT: 117.2 LBS | HEIGHT: 66 IN | SYSTOLIC BLOOD PRESSURE: 110 MMHG | HEART RATE: 103 BPM

## 2023-03-06 DIAGNOSIS — K21.00 GASTROESOPHAGEAL REFLUX DISEASE WITH ESOPHAGITIS WITHOUT HEMORRHAGE: ICD-10-CM

## 2023-03-06 DIAGNOSIS — R11.0 NAUSEA: Primary | ICD-10-CM

## 2023-03-06 DIAGNOSIS — K59.04 CHRONIC IDIOPATHIC CONSTIPATION: ICD-10-CM

## 2023-03-06 PROCEDURE — 99213 OFFICE O/P EST LOW 20 MIN: CPT | Performed by: PHYSICIAN ASSISTANT

## 2023-03-06 RX ORDER — LUBIPROSTONE 8 UG/1
8 CAPSULE ORAL 2 TIMES DAILY WITH MEALS
Qty: 60 CAPSULE | Refills: 5 | Status: SHIPPED | OUTPATIENT
Start: 2023-03-06

## 2023-03-06 NOTE — PROGRESS NOTES
[facility]      Chief Complaint:   Chief Complaint   Patient presents with   • Follow-up     Endo        Subjective     HPI:   Patient presents for follow-up after having endoscopy completed due to chronic constipation and acid reflux despite treatment.  Patient was previously taking Phenergan for her acid reflux symptoms but at her last visit we discussed that this may be worsening her constipation.  She states she has done well without the medication and has not had to use her Zofran prescription.  At her last visit she was changed from Protonix to Nexium 40 mg once daily.  She was also given a prescription of Pepcid twice daily.  Patient states she has had complete relief of her acid reflux symptoms.  Patient is happy that she has gained 3 pounds since her last visit.  She was also started on Amitiza 8 mcg twice daily for constipation and reports that this has alleviated her constipation.  She denies diarrhea or any bleeding with bowel movements.    Colonoscopy showed internal/external hemorrhoids and biopsies taken of the terminal ileum and throughout the colon were negative.  EGD showed grade 3 esophagitis and gastritis.  Gastric biopsy shows reactive gastropathy, esophageal biopsy shows benign squamous mucosa and small bowel biopsy is normal.    Assessment/plan:  1.  GERD with esophagitis-patient will continue Nexium 40 mg once daily.  When her current prescription of Pepcid is completed, she will discontinue.  2.  Chronic idiopathic constipation-patient will continue Amitiza 8 mcg twice daily.  3.  Chronic nausea-controlled at present without medication.    Follow-up planned for 3 months.  Patient advised to contact the office sooner for any new, worsening or changing symptoms.  Patient will need repeat colonoscopy at age 43.    Past Medical History:   Past Medical History:   Diagnosis Date   • Abdominal pain    • Bacterial vaginosis    • Chlamydia    • Costal  chondritis    • Fatigue    • GERD (gastroesophageal reflux disease)    • Gonorrhea    • Hypoglycemia    • Malaise and fatigue    • Menorrhagia    • Well child visit        Past Surgical History:  Past Surgical History:   Procedure Laterality Date   • COLONOSCOPY N/A 2/27/2023    Procedure: COLONOSCOPY-look at TI, bx;  Surgeon: Guicho Quiñonez MD;  Location: [facility] ENDOSCOPY;  Service: Gastroenterology;  Laterality: N/A;   • ENDOSCOPY N/A 7/23/2019    Procedure: ESOPHAGOGASTRODUODENOSCOPY possible dilation;  Surgeon: Guicho Quiñonez MD;  Location: [facility] ENDOSCOPY;  Service: Gastroenterology   • ENDOSCOPY N/A 9/6/2022    Procedure: ESOPHAGOGASTRODUODENOSCOPY;  Surgeon: Guicho Quiñonez MD;  Location: [facility] ENDOSCOPY;  Service: Gastroenterology;  Laterality: N/A;   • ENDOSCOPY N/A 2/27/2023    Procedure: ESOPHAGOGASTRODUODENOSCOPY;  Surgeon: Guicho Quiñonez MD;  Location: [facility] ENDOSCOPY;  Service: Gastroenterology;  Laterality: N/A;   • TUBAL ABDOMINAL LIGATION  09/14/2020   • TYMPANOSTOMY TUBE PLACEMENT      Tympanostomy tube-2 (1)        Family History:  Family History   Problem Relation Age of Onset   • Osteoarthritis Mother    • Diabetes Maternal Grandmother    • Heart disease Maternal Grandmother    • Osteoarthritis Maternal Grandmother    • Coronary artery disease Paternal Grandfather        Social History:   reports that she has been smoking cigarettes. She has a 7.00 pack-year smoking history. She has never used smokeless tobacco. She reports that she does not drink alcohol and does not use drugs.    Medications:   Prior to Admission medications    Medication Sig Start Date End Date Taking? Authorizing Provider   esomeprazole (nexIUM) 40 MG capsule Take 1 capsule by mouth Daily. 2/1/23  Yes Dara Sher PA-C   famotidine (Pepcid) 20 MG tablet Take 1 tablet by mouth 2 (Two) Times a Day As Needed for Indigestion or Heartburn. 2/1/23  Yes Dara Sher PA-C   lubiprostone (Amitiza) 8 MCG capsule Take  "1 capsule by mouth 2 (Two) Times a Day With Meals. 3/6/23  Yes Dara Sher PA-C   ondansetron ODT (ZOFRAN-ODT) 4 MG disintegrating tablet Place 1 tablet on the tongue Every 8 (Eight) Hours As Needed for Nausea or Vomiting. 2/1/23  Yes Dara Sher PA-C   lubiprostone (Amitiza) 8 MCG capsule Take 1 capsule by mouth 2 (Two) Times a Day With Meals. 2/1/23 3/6/23 Yes Dara Sher PA-C       Allergies:  Amoxicillin, Loratadine, Penicillins, and Codeine    ROS:    Review of Systems   Constitutional: Negative.  Negative for fever and unexpected weight change.   HENT: Negative.  Negative for trouble swallowing.    Eyes: Negative.    Respiratory: Negative.  Negative for shortness of breath.    Cardiovascular: Negative.  Negative for chest pain.   Gastrointestinal: Negative.  Negative for abdominal distention, abdominal pain, anal bleeding, blood in stool, constipation, diarrhea, nausea, rectal pain and vomiting.   Endocrine: Negative.    Genitourinary: Negative.    Skin: Negative.    Neurological: Negative.    Hematological: Negative.    Psychiatric/Behavioral: Negative.      Objective     Blood pressure 110/60, pulse 103, height 167.6 cm (66\"), weight 53.2 kg (117 lb 3.2 oz), last menstrual period 02/18/2023, not currently breastfeeding.    Physical Exam  Vitals and nursing note reviewed.   Constitutional:       Appearance: Normal appearance.   HENT:      Head: Normocephalic and atraumatic.   Cardiovascular:      Rate and Rhythm: Normal rate and regular rhythm.   Pulmonary:      Effort: Pulmonary effort is normal.      Breath sounds: Normal breath sounds.   Abdominal:      General: Bowel sounds are normal.      Palpations: Abdomen is soft.      Tenderness: There is no abdominal tenderness. There is no guarding or rebound.   Skin:     General: Skin is warm.      Coloration: Skin is not jaundiced.   Neurological:      General: No focal deficit present.      Mental Status: She is alert.   Psychiatric:         " Behavior: Behavior normal.          Assessment & Plan   Diagnoses and all orders for this visit:    1. Nausea (Primary)    2. Chronic idiopathic constipation    3. Gastroesophageal reflux disease with esophagitis without hemorrhage    Other orders  -     lubiprostone (Amitiza) 8 MCG capsule; Take 1 capsule by mouth 2 (Two) Times a Day With Meals.  Dispense: 60 capsule; Refill: 5        * Surgery not found *     Diagnosis Plan   1. Nausea        2. Chronic idiopathic constipation        3. Gastroesophageal reflux disease with esophagitis without hemorrhage            Anticipated Surgical Procedure:  No orders of the defined types were placed in this encounter.      The risks, benefits, and alternatives of this procedure have been discussed with the patient or the responsible party- the patient understands and agrees to proceed.

## 2023-04-10 ENCOUNTER — OFFICE VISIT (OUTPATIENT)
Dept: GASTROENTEROLOGY | Facility: CLINIC | Age: 23
End: 2023-04-10
Payer: COMMERCIAL

## 2023-04-10 ENCOUNTER — LAB (OUTPATIENT)
Dept: LAB | Facility: OTHER | Age: 23
End: 2023-04-10
Payer: COMMERCIAL

## 2023-04-10 VITALS
HEART RATE: 85 BPM | BODY MASS INDEX: 19.83 KG/M2 | DIASTOLIC BLOOD PRESSURE: 70 MMHG | HEIGHT: 65 IN | SYSTOLIC BLOOD PRESSURE: 126 MMHG | WEIGHT: 119 LBS

## 2023-04-10 DIAGNOSIS — R10.817 GENERALIZED ABDOMINAL TENDERNESS WITHOUT REBOUND TENDERNESS: Primary | ICD-10-CM

## 2023-04-10 DIAGNOSIS — R19.7 DIARRHEA, UNSPECIFIED TYPE: ICD-10-CM

## 2023-04-10 DIAGNOSIS — R10.84 GENERALIZED ABDOMINAL PAIN: ICD-10-CM

## 2023-04-10 DIAGNOSIS — K92.1 MELENA: Primary | ICD-10-CM

## 2023-04-10 PROCEDURE — 1159F MED LIST DOCD IN RCRD: CPT | Performed by: PHYSICIAN ASSISTANT

## 2023-04-10 PROCEDURE — 36415 COLL VENOUS BLD VENIPUNCTURE: CPT | Performed by: PHYSICIAN ASSISTANT

## 2023-04-10 PROCEDURE — 99214 OFFICE O/P EST MOD 30 MIN: CPT | Performed by: PHYSICIAN ASSISTANT

## 2023-04-10 PROCEDURE — 85027 COMPLETE CBC AUTOMATED: CPT | Performed by: PHYSICIAN ASSISTANT

## 2023-04-10 PROCEDURE — 1160F RVW MEDS BY RX/DR IN RCRD: CPT | Performed by: PHYSICIAN ASSISTANT

## 2023-04-10 RX ORDER — SODIUM CHLORIDE 9 MG/ML
40 INJECTION, SOLUTION INTRAVENOUS AS NEEDED
OUTPATIENT
Start: 2023-04-10

## 2023-04-10 RX ORDER — DEXTROSE AND SODIUM CHLORIDE 5; .45 G/100ML; G/100ML
30 INJECTION, SOLUTION INTRAVENOUS CONTINUOUS PRN
OUTPATIENT
Start: 2023-04-10

## 2023-04-10 RX ORDER — SUCRALFATE 1 G/1
1 TABLET ORAL 4 TIMES DAILY
Qty: 120 TABLET | Refills: 1 | Status: SHIPPED | OUTPATIENT
Start: 2023-04-10

## 2023-04-10 NOTE — PROGRESS NOTES
[facility]      Chief Complaint:   Chief Complaint   Patient presents with   • Abdominal Pain       Subjective     HPI:   Patient presents for evaluation of recent onset of abdominal pain, melena and diarrhea.  Patient states all her symptoms started 1 week ago today.  Patient states she began having 6-7 episodes of diarrhea per day that were all black in color.  Patient states she has not been using Pepto-Bismol over-the-counter or iron supplementation.  Patient states she continues to take Nexium 40 mg each morning and Pepcid 20 mg in the evening if needed for breakthrough symptoms.  Patient states she has not used NSAIDs.    Patient states she saw her PCP for this last week and he advised her to follow-up with gastroenterology.  On 4/6/2023 patient's hemoglobin/hematocrit were 13.7/41.7.    Today, patient reports she continues to have loose to soft formed stools.  Patient states she had 1 bowel movement yesterday that continue to be black in color.  She continues to endorse epigastric abdominal pain and generalized abdominal pain.  Patient has discontinued Amitiza due to loose stools.    Assessment/plan:  1.   Melena  2.  Generalized abdominal pain and tenderness on exam  3.  Diarrhea    Patient will have a stat CBC drawn today.  Patient will be scheduled for CT of the abdomen pelvis with contrast.  Hemoccult stools x3 and stool studies ordered.  EGD scheduled for further evaluation of melena.  Patient will be started on Carafate 1 g 4 times daily.  She will continue Nexium 40 mg each morning and Pepcid 20 mg in the evening if needed.  Patient advised to go to the ER for suddenly worsening symptoms.  She states that her PCP recommended evaluation of her gallbladder, discussed that this can certainly be completed but I feel the melena is concerning for possible GI bleeding and needs more urgent evaluation at the present time.  Follow-up will be planned after EGD and CT  have been completed, subject to change based upon findings.    Past Medical History:   Past Medical History:   Diagnosis Date   • Abdominal pain    • Bacterial vaginosis    • Chlamydia    • Costal chondritis    • Fatigue    • GERD (gastroesophageal reflux disease)    • Gonorrhea    • Hypoglycemia    • Malaise and fatigue    • Menorrhagia    • Well child visit        Past Surgical History:    Past Surgical History:   Procedure Laterality Date   • COLONOSCOPY N/A 2/27/2023    Procedure: COLONOSCOPY-look at TI, bx;  Surgeon: Guicho Quiñonez MD;  Location: [facility] ENDOSCOPY;  Service: Gastroenterology;  Laterality: N/A;   • ENDOSCOPY N/A 7/23/2019    Procedure: ESOPHAGOGASTRODUODENOSCOPY possible dilation;  Surgeon: Guicho Quiñonez MD;  Location: [facility] ENDOSCOPY;  Service: Gastroenterology   • ENDOSCOPY N/A 9/6/2022    Procedure: ESOPHAGOGASTRODUODENOSCOPY;  Surgeon: Guicho Quiñonez MD;  Location: [facility] ENDOSCOPY;  Service: Gastroenterology;  Laterality: N/A;   • ENDOSCOPY N/A 2/27/2023    Procedure: ESOPHAGOGASTRODUODENOSCOPY;  Surgeon: Guicho Quiñonez MD;  Location: [facility] ENDOSCOPY;  Service: Gastroenterology;  Laterality: N/A;   • TUBAL ABDOMINAL LIGATION  09/14/2020   • TYMPANOSTOMY TUBE PLACEMENT      Tympanostomy tube-2 (1)        Family History:  Family History   Problem Relation Age of Onset   • Osteoarthritis Mother    • Diabetes Maternal Grandmother    • Heart disease Maternal Grandmother    • Osteoarthritis Maternal Grandmother    • Coronary artery disease Paternal Grandfather        Social History:   reports that she has been smoking cigarettes. She has a 7.00 pack-year smoking history. She has never used smokeless tobacco. She reports that she does not drink alcohol and does not use drugs.    Medications:   Prior to Admission medications    Medication Sig Start Date End Date Taking? Authorizing Provider   esomeprazole (nexIUM) 40 MG capsule Take 1 capsule by mouth Daily. 2/1/23  Yes Dara Sher,  "ROSAMARIA   famotidine (Pepcid) 20 MG tablet Take 1 tablet by mouth 2 (Two) Times a Day As Needed for Indigestion or Heartburn. 2/1/23  Yes Dara Sher PA-C   lubiprostone (Amitiza) 8 MCG capsule Take 1 capsule by mouth 2 (Two) Times a Day With Meals. 3/6/23  Yes Dara Sher PA-C   ondansetron ODT (ZOFRAN-ODT) 4 MG disintegrating tablet Place 1 tablet on the tongue Every 8 (Eight) Hours As Needed for Nausea or Vomiting. 2/1/23  Yes Dara Sher PA-C   sucralfate (Carafate) 1 g tablet Take 1 tablet by mouth 4 (Four) Times a Day. 4/10/23   Dara Sher PA-C       Allergies:  Amoxicillin, Loratadine, Penicillins, and Codeine    ROS:    Review of Systems   Constitutional: Negative.  Negative for fever and unexpected weight change.   HENT: Negative.  Negative for trouble swallowing.    Eyes: Negative.    Respiratory: Negative.  Negative for shortness of breath.    Cardiovascular: Negative.  Negative for chest pain.   Gastrointestinal: Positive for abdominal distention, abdominal pain, diarrhea and nausea. Negative for anal bleeding, blood in stool, constipation, rectal pain and vomiting.   Endocrine: Negative.    Genitourinary: Negative.    Skin: Negative.    Neurological: Negative.    Hematological: Negative.    Psychiatric/Behavioral: Negative.      Objective     Blood pressure 126/70, pulse 85, height 165.1 cm (65\"), weight 54 kg (119 lb), not currently breastfeeding.    Physical Exam  Vitals and nursing note reviewed.   Constitutional:       Appearance: Normal appearance.   HENT:      Head: Normocephalic and atraumatic.   Eyes:      General: No scleral icterus.     Conjunctiva/sclera: Conjunctivae normal.   Cardiovascular:      Rate and Rhythm: Normal rate and regular rhythm.   Pulmonary:      Effort: Pulmonary effort is normal.      Breath sounds: Normal breath sounds.   Abdominal:      General: Bowel sounds are normal.      Palpations: Abdomen is soft.      Tenderness: There is abdominal tenderness. " There is no guarding or rebound.   Skin:     General: Skin is warm.      Coloration: Skin is not jaundiced or pale.   Neurological:      General: No focal deficit present.      Mental Status: She is alert.   Psychiatric:         Behavior: Behavior normal.          Assessment & Plan   Diagnoses and all orders for this visit:    1. Melena (Primary)  -     CBC (No Diff)  -     Occult Blood X 3, Stool - Stool, Per Rectum  -     Case Request; Standing  -     sodium chloride 0.9 % infusion 40 mL  -     dextrose 5 % and sodium chloride 0.45 % infusion  -     Case Request  -     CT Abdomen Pelvis With Contrast; Future    2. Diarrhea, unspecified type  -     CT Abdomen Pelvis With Contrast; Future  -     Gastrointestinal Panel, PCR - Stool, Per Rectum; Future    3. Generalized abdominal pain  -     CT Abdomen Pelvis With Contrast; Future  -     Gastrointestinal Panel, PCR - Stool, Per Rectum; Future    Other orders  -     sucralfate (Carafate) 1 g tablet; Take 1 tablet by mouth 4 (Four) Times a Day.  Dispense: 120 tablet; Refill: 1  -     Follow Anesthesia Guidelines / Protocol; Future  -     Obtain Informed Consent; Future  -     Implement Anesthesia Orders Day of Procedure; Standing  -     Obtain Informed Consent; Standing  -     POC Glucose Once; Standing  -     Insert Peripheral IV; Standing        ESOPHAGOGASTRODUODENOSCOPY (N/A)     Diagnosis Plan   1. Melena  CBC (No Diff)    Occult Blood X 3, Stool - Stool, Per Rectum    Case Request    sodium chloride 0.9 % infusion 40 mL    dextrose 5 % and sodium chloride 0.45 % infusion    Case Request    CT Abdomen Pelvis With Contrast      2. Diarrhea, unspecified type  CT Abdomen Pelvis With Contrast    Gastrointestinal Panel, PCR - Stool, Per Rectum      3. Generalized abdominal pain  CT Abdomen Pelvis With Contrast    Gastrointestinal Panel, PCR - Stool, Per Rectum          Anticipated Surgical Procedure:  Orders Placed This Encounter   Procedures   • Gastrointestinal Panel,  PCR - Stool, Per Rectum     Standing Status:   Future     Standing Expiration Date:   4/10/2024     Order Specific Question:   Release to patient     Answer:   Routine Release   • CT Abdomen Pelvis With Contrast     Standing Status:   Future     Standing Expiration Date:   4/10/2024     Order Specific Question:   Release to patient     Answer:   Routine Release     Order Specific Question:   Will Oral Contrast be needed for this procedure?     Answer:   Yes     Order Specific Question:   Patient Pregnant     Answer:   No   • CBC (No Diff)     Order Specific Question:   Release to patient     Answer:   Routine Release   • Occult Blood X 3, Stool - Stool, Per Rectum     Order Specific Question:   Is this occult blood testing for Screening purposes?     Answer:   No     Order Specific Question:   Release to patient     Answer:   Routine Release   • Obtain Informed Consent     Standing Status:   Future     Order Specific Question:   Informed Consent Given For     Answer:   ESOPHAGOGASTRODUODENOSCOPY       The risks, benefits, and alternatives of this procedure have been discussed with the patient or the responsible party- the patient understands and agrees to proceed.

## 2023-04-10 NOTE — H&P (VIEW-ONLY)
[facility]      Chief Complaint:   Chief Complaint   Patient presents with   • Abdominal Pain       Subjective     HPI:   Patient presents for evaluation of recent onset of abdominal pain, melena and diarrhea.  Patient states all her symptoms started 1 week ago today.  Patient states she began having 6-7 episodes of diarrhea per day that were all black in color.  Patient states she has not been using Pepto-Bismol over-the-counter or iron supplementation.  Patient states she continues to take Nexium 40 mg each morning and Pepcid 20 mg in the evening if needed for breakthrough symptoms.  Patient states she has not used NSAIDs.    Patient states she saw her PCP for this last week and he advised her to follow-up with gastroenterology.  On 4/6/2023 patient's hemoglobin/hematocrit were 13.7/41.7.    Today, patient reports she continues to have loose to soft formed stools.  Patient states she had 1 bowel movement yesterday that continue to be black in color.  She continues to endorse epigastric abdominal pain and generalized abdominal pain.  Patient has discontinued Amitiza due to loose stools.    Assessment/plan:  1.   Melena  2.  Generalized abdominal pain and tenderness on exam  3.  Diarrhea    Patient will have a stat CBC drawn today.  Patient will be scheduled for CT of the abdomen pelvis with contrast.  Hemoccult stools x3 and stool studies ordered.  EGD scheduled for further evaluation of melena.  Patient will be started on Carafate 1 g 4 times daily.  She will continue Nexium 40 mg each morning and Pepcid 20 mg in the evening if needed.  Patient advised to go to the ER for suddenly worsening symptoms.  She states that her PCP recommended evaluation of her gallbladder, discussed that this can certainly be completed but I feel the melena is concerning for possible GI bleeding and needs more urgent evaluation at the present time.  Follow-up will be planned after EGD and CT  have been completed, subject to change based upon findings.    Past Medical History:   Past Medical History:   Diagnosis Date   • Abdominal pain    • Bacterial vaginosis    • Chlamydia    • Costal chondritis    • Fatigue    • GERD (gastroesophageal reflux disease)    • Gonorrhea    • Hypoglycemia    • Malaise and fatigue    • Menorrhagia    • Well child visit        Past Surgical History:    Past Surgical History:   Procedure Laterality Date   • COLONOSCOPY N/A 2/27/2023    Procedure: COLONOSCOPY-look at TI, bx;  Surgeon: Guicho Quiñonez MD;  Location: [facility] ENDOSCOPY;  Service: Gastroenterology;  Laterality: N/A;   • ENDOSCOPY N/A 7/23/2019    Procedure: ESOPHAGOGASTRODUODENOSCOPY possible dilation;  Surgeon: Guicho Quiñonez MD;  Location: [facility] ENDOSCOPY;  Service: Gastroenterology   • ENDOSCOPY N/A 9/6/2022    Procedure: ESOPHAGOGASTRODUODENOSCOPY;  Surgeon: Guicho Quiñonez MD;  Location: [facility] ENDOSCOPY;  Service: Gastroenterology;  Laterality: N/A;   • ENDOSCOPY N/A 2/27/2023    Procedure: ESOPHAGOGASTRODUODENOSCOPY;  Surgeon: Guicho Quiñonez MD;  Location: [facility] ENDOSCOPY;  Service: Gastroenterology;  Laterality: N/A;   • TUBAL ABDOMINAL LIGATION  09/14/2020   • TYMPANOSTOMY TUBE PLACEMENT      Tympanostomy tube-2 (1)        Family History:  Family History   Problem Relation Age of Onset   • Osteoarthritis Mother    • Diabetes Maternal Grandmother    • Heart disease Maternal Grandmother    • Osteoarthritis Maternal Grandmother    • Coronary artery disease Paternal Grandfather        Social History:   reports that she has been smoking cigarettes. She has a 7.00 pack-year smoking history. She has never used smokeless tobacco. She reports that she does not drink alcohol and does not use drugs.    Medications:   Prior to Admission medications    Medication Sig Start Date End Date Taking? Authorizing Provider   esomeprazole (nexIUM) 40 MG capsule Take 1 capsule by mouth Daily. 2/1/23  Yes Dara Sher,  "ROSAMARIA   famotidine (Pepcid) 20 MG tablet Take 1 tablet by mouth 2 (Two) Times a Day As Needed for Indigestion or Heartburn. 2/1/23  Yes Dara Sher PA-C   lubiprostone (Amitiza) 8 MCG capsule Take 1 capsule by mouth 2 (Two) Times a Day With Meals. 3/6/23  Yes Dara Sher PA-C   ondansetron ODT (ZOFRAN-ODT) 4 MG disintegrating tablet Place 1 tablet on the tongue Every 8 (Eight) Hours As Needed for Nausea or Vomiting. 2/1/23  Yes Dara Sher PA-C   sucralfate (Carafate) 1 g tablet Take 1 tablet by mouth 4 (Four) Times a Day. 4/10/23   Dara Sher PA-C       Allergies:  Amoxicillin, Loratadine, Penicillins, and Codeine    ROS:    Review of Systems   Constitutional: Negative.  Negative for fever and unexpected weight change.   HENT: Negative.  Negative for trouble swallowing.    Eyes: Negative.    Respiratory: Negative.  Negative for shortness of breath.    Cardiovascular: Negative.  Negative for chest pain.   Gastrointestinal: Positive for abdominal distention, abdominal pain, diarrhea and nausea. Negative for anal bleeding, blood in stool, constipation, rectal pain and vomiting.   Endocrine: Negative.    Genitourinary: Negative.    Skin: Negative.    Neurological: Negative.    Hematological: Negative.    Psychiatric/Behavioral: Negative.      Objective     Blood pressure 126/70, pulse 85, height 165.1 cm (65\"), weight 54 kg (119 lb), not currently breastfeeding.    Physical Exam  Vitals and nursing note reviewed.   Constitutional:       Appearance: Normal appearance.   HENT:      Head: Normocephalic and atraumatic.   Eyes:      General: No scleral icterus.     Conjunctiva/sclera: Conjunctivae normal.   Cardiovascular:      Rate and Rhythm: Normal rate and regular rhythm.   Pulmonary:      Effort: Pulmonary effort is normal.      Breath sounds: Normal breath sounds.   Abdominal:      General: Bowel sounds are normal.      Palpations: Abdomen is soft.      Tenderness: There is abdominal tenderness. " There is no guarding or rebound.   Skin:     General: Skin is warm.      Coloration: Skin is not jaundiced or pale.   Neurological:      General: No focal deficit present.      Mental Status: She is alert.   Psychiatric:         Behavior: Behavior normal.          Assessment & Plan   Diagnoses and all orders for this visit:    1. Melena (Primary)  -     CBC (No Diff)  -     Occult Blood X 3, Stool - Stool, Per Rectum  -     Case Request; Standing  -     sodium chloride 0.9 % infusion 40 mL  -     dextrose 5 % and sodium chloride 0.45 % infusion  -     Case Request  -     CT Abdomen Pelvis With Contrast; Future    2. Diarrhea, unspecified type  -     CT Abdomen Pelvis With Contrast; Future  -     Gastrointestinal Panel, PCR - Stool, Per Rectum; Future    3. Generalized abdominal pain  -     CT Abdomen Pelvis With Contrast; Future  -     Gastrointestinal Panel, PCR - Stool, Per Rectum; Future    Other orders  -     sucralfate (Carafate) 1 g tablet; Take 1 tablet by mouth 4 (Four) Times a Day.  Dispense: 120 tablet; Refill: 1  -     Follow Anesthesia Guidelines / Protocol; Future  -     Obtain Informed Consent; Future  -     Implement Anesthesia Orders Day of Procedure; Standing  -     Obtain Informed Consent; Standing  -     POC Glucose Once; Standing  -     Insert Peripheral IV; Standing        ESOPHAGOGASTRODUODENOSCOPY (N/A)     Diagnosis Plan   1. Melena  CBC (No Diff)    Occult Blood X 3, Stool - Stool, Per Rectum    Case Request    sodium chloride 0.9 % infusion 40 mL    dextrose 5 % and sodium chloride 0.45 % infusion    Case Request    CT Abdomen Pelvis With Contrast      2. Diarrhea, unspecified type  CT Abdomen Pelvis With Contrast    Gastrointestinal Panel, PCR - Stool, Per Rectum      3. Generalized abdominal pain  CT Abdomen Pelvis With Contrast    Gastrointestinal Panel, PCR - Stool, Per Rectum          Anticipated Surgical Procedure:  Orders Placed This Encounter   Procedures   • Gastrointestinal Panel,  PCR - Stool, Per Rectum     Standing Status:   Future     Standing Expiration Date:   4/10/2024     Order Specific Question:   Release to patient     Answer:   Routine Release   • CT Abdomen Pelvis With Contrast     Standing Status:   Future     Standing Expiration Date:   4/10/2024     Order Specific Question:   Release to patient     Answer:   Routine Release     Order Specific Question:   Will Oral Contrast be needed for this procedure?     Answer:   Yes     Order Specific Question:   Patient Pregnant     Answer:   No   • CBC (No Diff)     Order Specific Question:   Release to patient     Answer:   Routine Release   • Occult Blood X 3, Stool - Stool, Per Rectum     Order Specific Question:   Is this occult blood testing for Screening purposes?     Answer:   No     Order Specific Question:   Release to patient     Answer:   Routine Release   • Obtain Informed Consent     Standing Status:   Future     Order Specific Question:   Informed Consent Given For     Answer:   ESOPHAGOGASTRODUODENOSCOPY       The risks, benefits, and alternatives of this procedure have been discussed with the patient or the responsible party- the patient understands and agrees to proceed.

## 2023-04-11 ENCOUNTER — LAB (OUTPATIENT)
Dept: LAB | Facility: OTHER | Age: 23
End: 2023-04-11
Payer: COMMERCIAL

## 2023-04-11 DIAGNOSIS — R19.7 DIARRHEA, UNSPECIFIED TYPE: ICD-10-CM

## 2023-04-11 DIAGNOSIS — R10.84 GENERALIZED ABDOMINAL PAIN: ICD-10-CM

## 2023-04-11 LAB
COLLECT DATE SP2 STL: NORMAL
COLLECT DATE SP3 STL: NORMAL
COLLECT DATE STL: NORMAL
DEPRECATED RDW RBC AUTO: 41.5 FL (ref 37–54)
ERYTHROCYTE [DISTWIDTH] IN BLOOD BY AUTOMATED COUNT: 12.9 % (ref 12.3–15.4)
GASTROCULT GAST QL: NEGATIVE
HCT VFR BLD AUTO: 40 % (ref 34–46.6)
HEMOCCULT SP2 STL QL: NEGATIVE
HEMOCCULT SP3 STL QL: NEGATIVE
HGB BLD-MCNC: 12.8 G/DL (ref 12–15.9)
Lab: NORMAL
MCH RBC QN AUTO: 28.1 PG (ref 26.6–33)
MCHC RBC AUTO-ENTMCNC: 32 G/DL (ref 31.5–35.7)
MCV RBC AUTO: 87.9 FL (ref 79–97)
PLATELET # BLD AUTO: 204 10*3/MM3 (ref 140–450)
PMV BLD AUTO: 12 FL (ref 6–12)
RBC # BLD AUTO: 4.55 10*6/MM3 (ref 3.77–5.28)
WBC NRBC COR # BLD: 3.44 10*3/MM3 (ref 3.4–10.8)

## 2023-04-11 PROCEDURE — 82272 OCCULT BLD FECES 1-3 TESTS: CPT | Performed by: PHYSICIAN ASSISTANT

## 2023-05-08 ENCOUNTER — ANESTHESIA EVENT (OUTPATIENT)
Dept: GASTROENTEROLOGY | Facility: HOSPITAL | Age: 23
End: 2023-05-08
Payer: COMMERCIAL

## 2023-05-08 ENCOUNTER — HOSPITAL ENCOUNTER (OUTPATIENT)
Facility: HOSPITAL | Age: 23
Setting detail: HOSPITAL OUTPATIENT SURGERY
Discharge: HOME | End: 2023-05-08
Attending: INTERNAL MEDICINE | Admitting: INTERNAL MEDICINE
Payer: COMMERCIAL

## 2023-05-08 ENCOUNTER — ANESTHESIA (OUTPATIENT)
Dept: GASTROENTEROLOGY | Facility: HOSPITAL | Age: 23
End: 2023-05-08
Payer: COMMERCIAL

## 2023-05-08 VITALS
DIASTOLIC BLOOD PRESSURE: 59 MMHG | BODY MASS INDEX: 18.8 KG/M2 | SYSTOLIC BLOOD PRESSURE: 121 MMHG | HEART RATE: 97 BPM | HEIGHT: 66 IN | OXYGEN SATURATION: 98 % | TEMPERATURE: 97.8 F | RESPIRATION RATE: 16 BRPM | WEIGHT: 117 LBS

## 2023-05-08 DIAGNOSIS — K92.1 MELENA: ICD-10-CM

## 2023-05-08 PROCEDURE — 43239 EGD BIOPSY SINGLE/MULTIPLE: CPT | Performed by: INTERNAL MEDICINE

## 2023-05-08 PROCEDURE — 25010000002 PROPOFOL 10 MG/ML EMULSION: Performed by: NURSE ANESTHETIST, CERTIFIED REGISTERED

## 2023-05-08 RX ORDER — PROPOFOL 10 MG/ML
VIAL (ML) INTRAVENOUS AS NEEDED
Status: DISCONTINUED | OUTPATIENT
Start: 2023-05-08 | End: 2023-05-08 | Stop reason: SURG

## 2023-05-08 RX ORDER — LIDOCAINE HYDROCHLORIDE 20 MG/ML
INJECTION, SOLUTION INTRAVENOUS AS NEEDED
Status: DISCONTINUED | OUTPATIENT
Start: 2023-05-08 | End: 2023-05-08 | Stop reason: SURG

## 2023-05-08 RX ORDER — DEXTROSE AND SODIUM CHLORIDE 5; .45 G/100ML; G/100ML
30 INJECTION, SOLUTION INTRAVENOUS CONTINUOUS PRN
Status: DISCONTINUED | OUTPATIENT
Start: 2023-05-08 | End: 2023-05-08 | Stop reason: HOSPADM

## 2023-05-08 RX ORDER — SODIUM CHLORIDE 9 MG/ML
40 INJECTION, SOLUTION INTRAVENOUS AS NEEDED
Status: DISCONTINUED | OUTPATIENT
Start: 2023-05-08 | End: 2023-05-08 | Stop reason: HOSPADM

## 2023-05-08 RX ADMIN — PROPOFOL 40 MG: 10 INJECTION, EMULSION INTRAVENOUS at 11:42

## 2023-05-08 RX ADMIN — PROPOFOL 40 MG: 10 INJECTION, EMULSION INTRAVENOUS at 11:39

## 2023-05-08 RX ADMIN — PROPOFOL 100 MG: 10 INJECTION, EMULSION INTRAVENOUS at 11:38

## 2023-05-08 RX ADMIN — LIDOCAINE HYDROCHLORIDE 50 MG: 20 INJECTION, SOLUTION INTRAVENOUS at 11:37

## 2023-05-08 RX ADMIN — DEXTROSE AND SODIUM CHLORIDE 30 ML/HR: 5; 450 INJECTION, SOLUTION INTRAVENOUS at 11:13

## 2023-05-08 RX ADMIN — PROPOFOL 60 MG: 10 INJECTION, EMULSION INTRAVENOUS at 11:41

## 2023-05-08 NOTE — ANESTHESIA POSTPROCEDURE EVALUATION
Patient: Mandi Walters    Procedure Summary     Date: 05/08/23 Room / Location: [facility] ENDOSCOPY 1 / [facility] ENDOSCOPY    Anesthesia Start: 1136 Anesthesia Stop: 1144    Procedure: ESOPHAGOGASTRODUODENOSCOPY Diagnosis:       Melena      (Melena [K92.1])    Surgeons: Guicho Quiñonez MD Provider: Rd Schwab CRNA    Anesthesia Type: general ASA Status: 2          Anesthesia Type: general    Vitals  No vitals data found for the desired time range.          Post Anesthesia Care and Evaluation    Patient location during evaluation: PHASE II  Patient participation: waiting for patient participation  Level of consciousness: sleepy but conscious  Pain management: adequate    Airway patency: patent  Anesthetic complications: No anesthetic complications  PONV Status: none  Cardiovascular status: acceptable  Respiratory status: acceptable, spontaneous ventilation and room air  Hydration status: acceptable    Comments: See flow sheet

## 2023-05-09 LAB — REF LAB TEST METHOD: NORMAL

## 2023-05-15 ENCOUNTER — OFFICE VISIT (OUTPATIENT)
Dept: GASTROENTEROLOGY | Facility: CLINIC | Age: 23
End: 2023-05-15
Payer: COMMERCIAL

## 2023-05-15 VITALS
HEART RATE: 109 BPM | HEIGHT: 66 IN | WEIGHT: 115 LBS | BODY MASS INDEX: 18.48 KG/M2 | DIASTOLIC BLOOD PRESSURE: 60 MMHG | SYSTOLIC BLOOD PRESSURE: 100 MMHG

## 2023-05-15 DIAGNOSIS — K59.04 CHRONIC IDIOPATHIC CONSTIPATION: Primary | ICD-10-CM

## 2023-05-15 DIAGNOSIS — K21.00 GASTROESOPHAGEAL REFLUX DISEASE WITH ESOPHAGITIS WITHOUT HEMORRHAGE: ICD-10-CM

## 2023-05-15 RX ORDER — LUBIPROSTONE 8 UG/1
8 CAPSULE ORAL 2 TIMES DAILY WITH MEALS
Qty: 60 CAPSULE | Refills: 5 | Status: SHIPPED | OUTPATIENT
Start: 2023-05-15

## 2023-05-15 RX ORDER — ESOMEPRAZOLE MAGNESIUM 40 MG/1
40 CAPSULE, DELAYED RELEASE ORAL DAILY
Qty: 30 CAPSULE | Refills: 5 | Status: SHIPPED | OUTPATIENT
Start: 2023-05-15

## 2023-05-15 RX ORDER — FAMOTIDINE 20 MG/1
20 TABLET, FILM COATED ORAL 2 TIMES DAILY PRN
Qty: 60 TABLET | Refills: 5 | Status: SHIPPED | OUTPATIENT
Start: 2023-05-15

## 2023-05-15 NOTE — PROGRESS NOTES
[facility]      Chief Complaint:   Chief Complaint   Patient presents with   • Follow-up       Subjective     HPI:   Patient presents for follow-up after having CT of the abdomen pelvis and EGD completed due to reported melena, profuse diarrhea and generalized abdominal pain.  Patient reports today that all symptoms have resolved.    Patient is currently taking Nexium 40 mg each morning, Pepcid 20 mg each evening and Amitiza 8 mcg twice daily for constipation.  Patient states that she never started the Carafate she was prescribed at her last visit.  Patient has had no further episodes of diarrhea, melena and denies abdominal pain.      CT of the abdomen pelvis radiology report reads: No acute findings in the abdomen or pelvis.  Physiologic right ovarian cyst and small pelvic free fluid.  Partially imaged pectus excavatum deformity.    EGD shows grade 2 esophagitis and diffuse gastritis.  Gastric antrum biopsy shows reactive gastropathy.  Duodenal biopsy and distal esophageal biopsy are normal.    Assessment/plan:  1.  Chronic constipation-continue Amitiza 8 mcg twice daily  2.  GERD with esophagitis-continue Nexium 40 mg each morning and Pepcid 20 mg each evening.  Advised to avoid gastric irritants.    Patient is inquiring about medications to help her gain weight.  Patient states she typically eats 1 meal a day and snacks the rest of the day.  BMI 18.56.  Encouraged her to track her daily calorie consumption.  Patient is also requesting to be put back on Phenergan because it made her gain weight.  I will not refill her Phenergan.  She has not had any nausea or vomiting and has Zofran at home if needed.  Follow-up in 6 months or sooner for problems.    Past Medical History:   Past Medical History:   Diagnosis Date   • Abdominal pain    • Bacterial vaginosis    • Chlamydia    • Costal chondritis    • Fatigue    • GERD (gastroesophageal reflux disease)    • Gonorrhea    •  Hypoglycemia    • Malaise and fatigue    • Menorrhagia    • Well child visit        Past Surgical History:    Past Surgical History:   Procedure Laterality Date   • COLONOSCOPY N/A 02/27/2023    Procedure: COLONOSCOPY-look at TI, bx;  Surgeon: Guicho Quiñonez MD;  Location: [facility] ENDOSCOPY;  Service: Gastroenterology;  Laterality: N/A;   • ENDOSCOPY N/A 07/23/2019    Procedure: ESOPHAGOGASTRODUODENOSCOPY possible dilation;  Surgeon: Guicho Quiñonez MD;  Location: [facility] ENDOSCOPY;  Service: Gastroenterology   • ENDOSCOPY N/A 09/06/2022    Procedure: ESOPHAGOGASTRODUODENOSCOPY;  Surgeon: Guicho Quiñonez MD;  Location: [facility] ENDOSCOPY;  Service: Gastroenterology;  Laterality: N/A;   • ENDOSCOPY N/A 02/27/2023    Procedure: ESOPHAGOGASTRODUODENOSCOPY;  Surgeon: Guicho Quiñonez MD;  Location: [facility] ENDOSCOPY;  Service: Gastroenterology;  Laterality: N/A;   • ENDOSCOPY N/A 5/8/2023    Procedure: ESOPHAGOGASTRODUODENOSCOPY;  Surgeon: Guicho Quiñonez MD;  Location: [facility] ENDOSCOPY;  Service: Gastroenterology;  Laterality: N/A;   • TUBAL ABDOMINAL LIGATION  09/14/2020   • TYMPANOSTOMY TUBE PLACEMENT      Tympanostomy tube-2 (1)        Family History:  Family History   Problem Relation Age of Onset   • Osteoarthritis Mother    • Diabetes Maternal Grandmother    • Heart disease Maternal Grandmother    • Osteoarthritis Maternal Grandmother    • Coronary artery disease Paternal Grandfather        Social History:   reports that she has been smoking cigarettes. She has a 7.00 pack-year smoking history. She has never used smokeless tobacco. She reports that she does not drink alcohol and does not use drugs.    Medications:   Prior to Admission medications    Medication Sig Start Date End Date Taking? Authorizing Provider   esomeprazole (nexIUM) 40 MG capsule Take 1 capsule by mouth Daily. 5/15/23  Yes Dara Sher PA-C   famotidine (Pepcid) 20 MG tablet Take 1 tablet by mouth 2 (Two) Times a Day As Needed for  "Indigestion or Heartburn. 5/15/23  Yes Dara Sher PA-C   lubiprostone (Amitiza) 8 MCG capsule Take 1 capsule by mouth 2 (Two) Times a Day With Meals. 5/15/23  Yes Dara Sher PA-C   ondansetron ODT (ZOFRAN-ODT) 4 MG disintegrating tablet Place 1 tablet on the tongue Every 8 (Eight) Hours As Needed for Nausea or Vomiting. 2/1/23  Yes Dara Sher PA-C   esomeprazole (nexIUM) 40 MG capsule Take 1 capsule by mouth Daily. 2/1/23 5/15/23 Yes Dara Sher PA-C   famotidine (Pepcid) 20 MG tablet Take 1 tablet by mouth 2 (Two) Times a Day As Needed for Indigestion or Heartburn. 2/1/23 5/15/23 Yes Dara Sher PA-C   lubiprostone (Amitiza) 8 MCG capsule Take 1 capsule by mouth 2 (Two) Times a Day With Meals. 3/6/23 5/15/23 Yes Dara Sher PA-C   sucralfate (Carafate) 1 g tablet Take 1 tablet by mouth 4 (Four) Times a Day. 4/10/23 5/15/23 Yes Dara Sher PA-C       Allergies:  Amoxicillin, Loratadine, Penicillins, and Codeine    ROS:    Review of Systems   Constitutional: Negative.  Negative for fever.   HENT: Negative.  Negative for trouble swallowing.    Eyes: Negative.    Respiratory: Negative.  Negative for shortness of breath.    Cardiovascular: Negative.  Negative for chest pain.   Gastrointestinal: Positive for constipation. Negative for abdominal distention, abdominal pain, anal bleeding, blood in stool, diarrhea, nausea, rectal pain and vomiting.   Endocrine: Negative.    Genitourinary: Negative.    Skin: Negative.    Neurological: Negative.    Hematological: Negative.    Psychiatric/Behavioral: Negative.      Objective     Blood pressure 100/60, pulse 109, height 167.6 cm (66\"), weight 52.2 kg (115 lb), not currently breastfeeding.    Physical Exam  Vitals and nursing note reviewed.   Constitutional:       Appearance: Normal appearance.   HENT:      Head: Normocephalic and atraumatic.   Cardiovascular:      Rate and Rhythm: Normal rate and regular rhythm.   Pulmonary:      Effort: " Pulmonary effort is normal.      Breath sounds: Normal breath sounds.   Abdominal:      General: Bowel sounds are normal.      Palpations: Abdomen is soft.      Tenderness: There is no abdominal tenderness. There is no guarding or rebound.   Skin:     General: Skin is warm.      Coloration: Skin is not jaundiced.   Neurological:      General: No focal deficit present.      Mental Status: She is alert.   Psychiatric:         Behavior: Behavior normal.          Assessment & Plan   Diagnoses and all orders for this visit:    1. Chronic idiopathic constipation (Primary)    2. Gastroesophageal reflux disease with esophagitis without hemorrhage    Other orders  -     esomeprazole (nexIUM) 40 MG capsule; Take 1 capsule by mouth Daily.  Dispense: 30 capsule; Refill: 5  -     famotidine (Pepcid) 20 MG tablet; Take 1 tablet by mouth 2 (Two) Times a Day As Needed for Indigestion or Heartburn.  Dispense: 60 tablet; Refill: 5  -     lubiprostone (Amitiza) 8 MCG capsule; Take 1 capsule by mouth 2 (Two) Times a Day With Meals.  Dispense: 60 capsule; Refill: 5        * Surgery not found *     Diagnosis Plan   1. Chronic idiopathic constipation        2. Gastroesophageal reflux disease with esophagitis without hemorrhage            Anticipated Surgical Procedure:  No orders of the defined types were placed in this encounter.      The risks, benefits, and alternatives of this procedure have been discussed with the patient or the responsible party- the patient understands and agrees to proceed.

## (undated) DEVICE — MSK ENDO PORT O2 POM ELITE CURAPLEX A/

## (undated) DEVICE — BITEBLOCK ENDO W/STRAP 60F A/ LF DISP

## (undated) DEVICE — SINGLE-USE BIOPSY FORCEPS: Brand: RADIAL JAW 4